# Patient Record
Sex: MALE | Race: WHITE | Employment: OTHER | URBAN - METROPOLITAN AREA
[De-identification: names, ages, dates, MRNs, and addresses within clinical notes are randomized per-mention and may not be internally consistent; named-entity substitution may affect disease eponyms.]

---

## 2020-02-17 ENCOUNTER — OFFICE VISIT (OUTPATIENT)
Dept: FAMILY MEDICINE CLINIC | Facility: CLINIC | Age: 80
End: 2020-02-17
Payer: COMMERCIAL

## 2020-02-17 VITALS
RESPIRATION RATE: 18 BRPM | DIASTOLIC BLOOD PRESSURE: 70 MMHG | HEIGHT: 68 IN | HEART RATE: 81 BPM | TEMPERATURE: 98 F | WEIGHT: 148 LBS | SYSTOLIC BLOOD PRESSURE: 140 MMHG | BODY MASS INDEX: 22.43 KG/M2 | OXYGEN SATURATION: 96 %

## 2020-02-17 DIAGNOSIS — R05.9 COUGH: Primary | ICD-10-CM

## 2020-02-17 DIAGNOSIS — I95.1 ORTHOSTATIC HYPOTENSION: ICD-10-CM

## 2020-02-17 DIAGNOSIS — Z13.1 DIABETES MELLITUS SCREENING: ICD-10-CM

## 2020-02-17 DIAGNOSIS — Z13.220 SCREENING FOR HYPERLIPIDEMIA: ICD-10-CM

## 2020-02-17 DIAGNOSIS — I10 ESSENTIAL HYPERTENSION: ICD-10-CM

## 2020-02-17 PROBLEM — J43.8 OTHER EMPHYSEMA (HCC): Status: ACTIVE | Noted: 2020-02-17

## 2020-02-17 PROBLEM — F17.200 TOBACCO DEPENDENCE: Status: ACTIVE | Noted: 2020-02-17

## 2020-02-17 PROCEDURE — 99203 OFFICE O/P NEW LOW 30 MIN: CPT | Performed by: FAMILY MEDICINE

## 2020-02-17 RX ORDER — LOSARTAN POTASSIUM 50 MG/1
TABLET ORAL
COMMUNITY
Start: 2020-02-06 | End: 2021-03-09

## 2020-02-17 RX ORDER — AMLODIPINE BESYLATE 10 MG/1
TABLET ORAL
COMMUNITY
Start: 2020-01-25 | End: 2021-03-09

## 2020-02-17 RX ORDER — FLUTICASONE PROPIONATE 50 MCG
2 SPRAY, SUSPENSION (ML) NASAL DAILY
Qty: 1 BOTTLE | Refills: 1 | Status: SHIPPED | OUTPATIENT
Start: 2020-02-17 | End: 2020-11-17

## 2020-02-17 RX ORDER — OXYBUTYNIN CHLORIDE 5 MG/1
TABLET ORAL
COMMUNITY
Start: 2019-12-06

## 2020-02-17 RX ORDER — FLUTICASONE PROPIONATE 50 MCG
SPRAY, SUSPENSION (ML) NASAL
COMMUNITY
Start: 2020-02-06 | End: 2020-02-17

## 2020-02-17 NOTE — PROGRESS NOTES
HPI:    Patient ID: Janina Goltz is a 70year old male who presents for cough and dizziness. HPI  Cough:  Cough started 3-4 weeks ago. Was productive of green mucus initially, but now more clear over the past few days. Never any blood in cough. typically gold in color. SH: Denies etoh or illicit drug use. Tobacco use as above. Lives with wife Brant Santos at home. Works at a [employer] in [place] x30 years. Works 7 days/wk.      Used to see Benja Solano at [facility].       Past Medical Hi external ear and ear canal normal.   Nose: Nose normal.   Mouth/Throat: Oropharynx is clear and moist. No oropharyngeal exudate or pharynx erythema. Eyes: Pupils are equal, round, and reactive to light.  Conjunctivae and EOM are normal. Right eye exhibits coffee. Likely slight dehydration contributing.   -Will reassess at f/u visit.      3. Essential hypertension  - COMP METABOLIC PANEL (14)    -Home BP typically around 129/82 per patient.   -Continue home BP monitoring.  -Continue current regimen: amlodipin

## 2020-02-24 ENCOUNTER — APPOINTMENT (OUTPATIENT)
Dept: LAB | Facility: REFERENCE LAB | Age: 80
End: 2020-02-24
Attending: FAMILY MEDICINE
Payer: MEDICARE

## 2020-02-24 LAB
ALBUMIN SERPL-MCNC: 4 G/DL (ref 3.4–5)
ALBUMIN/GLOB SERPL: 1.1 {RATIO} (ref 1–2)
ALP LIVER SERPL-CCNC: 107 U/L (ref 45–117)
ALT SERPL-CCNC: 22 U/L (ref 16–61)
ANION GAP SERPL CALC-SCNC: 5 MMOL/L (ref 0–18)
AST SERPL-CCNC: 20 U/L (ref 15–37)
BASOPHILS # BLD AUTO: 0.05 X10(3) UL (ref 0–0.2)
BASOPHILS NFR BLD AUTO: 0.6 %
BILIRUB SERPL-MCNC: 0.5 MG/DL (ref 0.1–2)
BUN BLD-MCNC: 12 MG/DL (ref 7–18)
BUN/CREAT SERPL: 10.8 (ref 10–20)
CALCIUM BLD-MCNC: 9 MG/DL (ref 8.5–10.1)
CHLORIDE SERPL-SCNC: 108 MMOL/L (ref 98–112)
CHOLEST SMN-MCNC: 171 MG/DL (ref ?–200)
CO2 SERPL-SCNC: 24 MMOL/L (ref 21–32)
CREAT BLD-MCNC: 1.11 MG/DL (ref 0.7–1.3)
DEPRECATED RDW RBC AUTO: 42.6 FL (ref 35.1–46.3)
EOSINOPHIL # BLD AUTO: 0.3 X10(3) UL (ref 0–0.7)
EOSINOPHIL NFR BLD AUTO: 3.3 %
ERYTHROCYTE [DISTWIDTH] IN BLOOD BY AUTOMATED COUNT: 13 % (ref 11–15)
EST. AVERAGE GLUCOSE BLD GHB EST-MCNC: 111 MG/DL (ref 68–126)
GLOBULIN PLAS-MCNC: 3.7 G/DL (ref 2.8–4.4)
GLUCOSE BLD-MCNC: 86 MG/DL (ref 70–99)
HBA1C MFR BLD HPLC: 5.5 % (ref ?–5.7)
HCT VFR BLD AUTO: 46 % (ref 39–53)
HDLC SERPL-MCNC: 38 MG/DL (ref 40–59)
HGB BLD-MCNC: 16.2 G/DL (ref 13–17.5)
IMM GRANULOCYTES # BLD AUTO: 0.02 X10(3) UL (ref 0–1)
IMM GRANULOCYTES NFR BLD: 0.2 %
LDLC SERPL CALC-MCNC: 106 MG/DL (ref ?–100)
LYMPHOCYTES # BLD AUTO: 2.89 X10(3) UL (ref 1–4)
LYMPHOCYTES NFR BLD AUTO: 32 %
M PROTEIN MFR SERPL ELPH: 7.7 G/DL (ref 6.4–8.2)
MCH RBC QN AUTO: 31.6 PG (ref 26–34)
MCHC RBC AUTO-ENTMCNC: 35.2 G/DL (ref 31–37)
MCV RBC AUTO: 89.8 FL (ref 80–100)
MONOCYTES # BLD AUTO: 0.58 X10(3) UL (ref 0.1–1)
MONOCYTES NFR BLD AUTO: 6.4 %
NEUTROPHILS # BLD AUTO: 5.19 X10 (3) UL (ref 1.5–7.7)
NEUTROPHILS # BLD AUTO: 5.19 X10(3) UL (ref 1.5–7.7)
NEUTROPHILS NFR BLD AUTO: 57.5 %
NONHDLC SERPL-MCNC: 133 MG/DL (ref ?–130)
OSMOLALITY SERPL CALC.SUM OF ELEC: 283 MOSM/KG (ref 275–295)
PATIENT FASTING Y/N/NP: YES
PATIENT FASTING Y/N/NP: YES
PLATELET # BLD AUTO: 197 10(3)UL (ref 150–450)
POTASSIUM SERPL-SCNC: 3.7 MMOL/L (ref 3.5–5.1)
RBC # BLD AUTO: 5.12 X10(6)UL (ref 3.8–5.8)
SODIUM SERPL-SCNC: 137 MMOL/L (ref 136–145)
TRIGL SERPL-MCNC: 135 MG/DL (ref 30–149)
VLDLC SERPL CALC-MCNC: 27 MG/DL (ref 0–30)
WBC # BLD AUTO: 9 X10(3) UL (ref 4–11)

## 2020-02-24 PROCEDURE — 80053 COMPREHEN METABOLIC PANEL: CPT | Performed by: FAMILY MEDICINE

## 2020-02-24 PROCEDURE — 80061 LIPID PANEL: CPT | Performed by: FAMILY MEDICINE

## 2020-02-24 PROCEDURE — 36415 COLL VENOUS BLD VENIPUNCTURE: CPT | Performed by: FAMILY MEDICINE

## 2020-02-24 PROCEDURE — 85025 COMPLETE CBC W/AUTO DIFF WBC: CPT | Performed by: FAMILY MEDICINE

## 2020-02-24 PROCEDURE — 83036 HEMOGLOBIN GLYCOSYLATED A1C: CPT | Performed by: FAMILY MEDICINE

## 2020-03-03 ENCOUNTER — OFFICE VISIT (OUTPATIENT)
Dept: FAMILY MEDICINE CLINIC | Facility: CLINIC | Age: 80
End: 2020-03-03
Payer: COMMERCIAL

## 2020-03-03 VITALS
TEMPERATURE: 98 F | HEIGHT: 68 IN | HEART RATE: 79 BPM | DIASTOLIC BLOOD PRESSURE: 78 MMHG | OXYGEN SATURATION: 96 % | BODY MASS INDEX: 22.43 KG/M2 | RESPIRATION RATE: 16 BRPM | SYSTOLIC BLOOD PRESSURE: 140 MMHG | WEIGHT: 148 LBS

## 2020-03-03 DIAGNOSIS — R05.9 COUGH: Primary | ICD-10-CM

## 2020-03-03 DIAGNOSIS — I95.1 ORTHOSTATIC HYPOTENSION: ICD-10-CM

## 2020-03-03 DIAGNOSIS — I10 ESSENTIAL HYPERTENSION: ICD-10-CM

## 2020-03-03 PROCEDURE — 99213 OFFICE O/P EST LOW 20 MIN: CPT | Performed by: FAMILY MEDICINE

## 2020-03-03 NOTE — PROGRESS NOTES
HPI:    Patient ID: Gabby Odonnell is a 70year old male who presents for f/u for cough, HTN, and dizziness. HPI  Cough:  Using flonase every night. Missed last night, and cough seemed to be worse. Wife thinks his cough is better.    Overall thinks Negative for heartburn, nausea, vomiting, abdominal pain, diarrhea, constipation, blood in stool, abdominal distention and anal bleeding. Genitourinary: Negative. Musculoskeletal: Negative. Skin: Negative.     Allergic/Immunologic: Negative for envi low-dose CT chest at that time for lung cancer screening. 2. Essential hypertension  -Home BP typically around 129/82 per patient.   -Continue home BP monitoring.  -Continue current regimen: amlodipine 10mg qPM, losartan 50mg qPM    3.  Orthostatic hypo

## 2020-11-16 ENCOUNTER — TELEPHONE (OUTPATIENT)
Dept: FAMILY MEDICINE CLINIC | Facility: CLINIC | Age: 80
End: 2020-11-16

## 2020-11-16 NOTE — TELEPHONE ENCOUNTER
As per MP request, pt scheduled for follow up 11/17 at 3:30pm.       RN returning Pt's call. Pt reports numbness in tongue, first noticed \" a couple of hours ago\". Pt states he was upset about something and noticed his tongue felt numb.  Pt denies any num

## 2020-11-16 NOTE — TELEPHONE ENCOUNTER
Patient states his tongue is numb since this morning and wants to know if he should go to the er or make an appointment   Please call back

## 2020-11-17 RX ORDER — FLUTICASONE PROPIONATE 50 MCG
SPRAY, SUSPENSION (ML) NASAL
Qty: 16 G | Refills: 0 | Status: SHIPPED | OUTPATIENT
Start: 2020-11-17 | End: 2021-06-17

## 2020-12-17 ENCOUNTER — TELEPHONE (OUTPATIENT)
Dept: FAMILY MEDICINE CLINIC | Facility: CLINIC | Age: 80
End: 2020-12-17

## 2021-02-03 DIAGNOSIS — Z23 NEED FOR VACCINATION: ICD-10-CM

## 2021-02-10 ENCOUNTER — PATIENT OUTREACH (OUTPATIENT)
Dept: FAMILY MEDICINE CLINIC | Facility: CLINIC | Age: 81
End: 2021-02-10

## 2021-03-09 ENCOUNTER — OFFICE VISIT (OUTPATIENT)
Dept: FAMILY MEDICINE CLINIC | Facility: CLINIC | Age: 81
End: 2021-03-09
Payer: COMMERCIAL

## 2021-03-09 VITALS
DIASTOLIC BLOOD PRESSURE: 70 MMHG | HEART RATE: 80 BPM | HEIGHT: 68 IN | OXYGEN SATURATION: 98 % | SYSTOLIC BLOOD PRESSURE: 140 MMHG | BODY MASS INDEX: 21.98 KG/M2 | WEIGHT: 145 LBS

## 2021-03-09 DIAGNOSIS — Z13.6 SCREENING FOR AAA (ABDOMINAL AORTIC ANEURYSM): ICD-10-CM

## 2021-03-09 DIAGNOSIS — J43.8 OTHER EMPHYSEMA (HCC): ICD-10-CM

## 2021-03-09 DIAGNOSIS — Z00.00 ENCOUNTER FOR ANNUAL HEALTH EXAMINATION: Primary | ICD-10-CM

## 2021-03-09 DIAGNOSIS — Z12.2 ENCOUNTER FOR SCREENING FOR LUNG CANCER: ICD-10-CM

## 2021-03-09 DIAGNOSIS — I10 ESSENTIAL HYPERTENSION: ICD-10-CM

## 2021-03-09 DIAGNOSIS — Z28.21 PNEUMOCOCCAL VACCINATION DECLINED: ICD-10-CM

## 2021-03-09 DIAGNOSIS — Z28.21 INFLUENZA VACCINATION DECLINED: ICD-10-CM

## 2021-03-09 DIAGNOSIS — F17.200 TOBACCO DEPENDENCE: ICD-10-CM

## 2021-03-09 DIAGNOSIS — Z12.5 PROSTATE CANCER SCREENING: ICD-10-CM

## 2021-03-09 DIAGNOSIS — Z13.220 SCREENING FOR HYPERLIPIDEMIA: ICD-10-CM

## 2021-03-09 PROBLEM — Z72.0 TOBACCO USER: Status: ACTIVE | Noted: 2019-03-19

## 2021-03-09 PROBLEM — Z72.0 TOBACCO USER: Status: RESOLVED | Noted: 2019-03-19 | Resolved: 2021-03-09

## 2021-03-09 PROBLEM — J44.9 CHRONIC OBSTRUCTIVE LUNG DISEASE (HCC): Status: ACTIVE | Noted: 2019-03-19

## 2021-03-09 PROCEDURE — 96160 PT-FOCUSED HLTH RISK ASSMT: CPT | Performed by: FAMILY MEDICINE

## 2021-03-09 PROCEDURE — G0439 PPPS, SUBSEQ VISIT: HCPCS | Performed by: FAMILY MEDICINE

## 2021-03-09 PROCEDURE — 99397 PER PM REEVAL EST PAT 65+ YR: CPT | Performed by: FAMILY MEDICINE

## 2021-03-09 PROCEDURE — 3077F SYST BP >= 140 MM HG: CPT | Performed by: FAMILY MEDICINE

## 2021-03-09 PROCEDURE — 3078F DIAST BP <80 MM HG: CPT | Performed by: FAMILY MEDICINE

## 2021-03-09 PROCEDURE — 3008F BODY MASS INDEX DOCD: CPT | Performed by: FAMILY MEDICINE

## 2021-03-09 RX ORDER — AMLODIPINE BESYLATE 10 MG/1
10 TABLET ORAL DAILY
Qty: 90 TABLET | Refills: 0 | Status: SHIPPED | OUTPATIENT
Start: 2021-03-09 | End: 2021-05-26

## 2021-03-09 RX ORDER — LOSARTAN POTASSIUM 50 MG/1
50 TABLET ORAL DAILY
Qty: 90 TABLET | Refills: 1 | Status: SHIPPED | OUTPATIENT
Start: 2021-03-09 | End: 2021-06-02

## 2021-03-09 NOTE — TELEPHONE ENCOUNTER
A refill request was received for:  Requested Prescriptions     Pending Prescriptions Disp Refills   • amLODIPine Besylate 10 MG Oral Tab 90 tablet 0     Sig: Take 1 tablet (10 mg total) by mouth daily.      Last refill date: 11/30/2020  Qty: 90  Last offic

## 2021-03-10 NOTE — PROGRESS NOTES
HPI:   Judy Smith is a 68year old male who presents for a MA (Medicare [employer]age) [address] (Once per calendar year). Still checking BP at home. Wife checks it and says it is good. She is a retired nurse.  Pt unsure of values but have been sta based on screening of functional status. He has difficulties Affording Meds based on screening of functional status. He has Hearing problems based on screening of functional status.       He has Vision problems based on screening of functional s TRIG 135 02/24/2020          Last Chemistry Labs:   Lab Results   Component Value Date    AST 20 02/24/2020    ALT 22 02/24/2020    CA 9.0 02/24/2020    ALB 4.0 02/24/2020    CREATSERUM 1.11 02/24/2020    GLU 86 02/24/2020        CBC  (most recent labs) lb (65.8 kg).     Medicare Hearing Assessment  (Required for AWV/SWV)    Normal via finger rub           Visual Acuity  Right Eye Visual Acuity: Corrected Right Eye Chart Acuity: 20/40   Left Eye Visual Acuity: Corrected Left Eye Chart Acuity: 20/50   Both (14)  -     CBC, PLATELET; NO DIFFERENTIAL    Other emphysema ([facility] [address]    Tobacco dependence    Encounter for screening for lung cancer  -     CT LUNG LD SCREENING(CPT=71271);  Future    Prostate cancer screening  -     PSA SCREEN; Future    Screening for hype every 10 years No results found for this or any previous visit. No flowsheet data found. Fecal Occult Blood Annually No results found for: FOB No flowsheet data found.     Glaucoma Screening      Ophthalmology Visit Annually: Diabetics, FHx Glaucoma, AA DIGOXIN, DIG, VALP No flowsheet data found. COPD      Spirometry Testing Annually Spirometry date:  No flowsheet data found.

## 2021-03-10 NOTE — PATIENT INSTRUCTIONS
Aye Dyer's SCREENING SCHEDULE   Tests on this list are recommended by your physician but may not be covered, or covered at this frequency, by your insurer. Please check with your insurance carrier before scheduling to verify coverage.     PREVENT more often if abnormal There are no preventive care reminders to display for this patient. Update Health Maintenance if applicable    Flex Sigmoidoscopy Screen  Covered every 5 years No results found for this or any previous visit. No flowsheet data found. be covered with your prescription benefits, but Medicare does not cover unless Medically needed    Zoster (Not covered by Medicare Part B) No orders found for this or any previous visit.  This may be covered with your pharmacy  prescription benefits     Rec

## 2021-03-26 ENCOUNTER — TELEPHONE (OUTPATIENT)
Dept: FAMILY MEDICINE CLINIC | Facility: CLINIC | Age: 81
End: 2021-03-26

## 2021-03-26 NOTE — TELEPHONE ENCOUNTER
Spoke with MP and:    Connie Fox, DO  You Just now (12:11 PM)     Sounds like orthostatic hypotension. If not having any other symptoms & dizziness resolves spontaneously, no need to go to IC/ED.  Increase water intake and encourage very slow positiona

## 2021-03-26 NOTE — TELEPHONE ENCOUNTER
Pts daughter called to say the pt has been feeling dizzy when he sits down or gets up from sitting. Pt feels it might be bp medication. Pt has been on same meds for last 2 years. Pt daughter states he did fall backwards the other day.      Pts daughter stat

## 2021-03-26 NOTE — TELEPHONE ENCOUNTER
Called daughter and pt c/o dizziness from laying down/sitting down or dizziness, started the last couple of weeks, when he gets up fast, \"feels like dizzy and off balance, when he is up he is fine performing his activities.       Denies passing out, noises

## 2021-05-24 ENCOUNTER — HOSPITAL ENCOUNTER (OUTPATIENT)
Dept: CT IMAGING | Facility: HOSPITAL | Age: 81
Discharge: HOME | End: 2021-05-24
Attending: FAMILY MEDICINE
Payer: MEDICARE

## 2021-05-24 DIAGNOSIS — Z12.2 ENCOUNTER FOR SCREENING FOR LUNG CANCER: ICD-10-CM

## 2021-05-24 PROCEDURE — 71271 CT THORAX LUNG CANCER SCR C-: CPT | Performed by: FAMILY MEDICINE

## 2021-05-25 ENCOUNTER — TELEPHONE (OUTPATIENT)
Dept: FAMILY MEDICINE CLINIC | Facility: CLINIC | Age: 81
End: 2021-05-25

## 2021-05-25 NOTE — TELEPHONE ENCOUNTER
Spoke to Dr. Narendra Morejon over phone. Pt has two suspicious nodules. His case to be reviewed by tumor board this Thursday to determine next step, but likely PET-CT. I will await update later this week.

## 2021-05-26 RX ORDER — AMLODIPINE BESYLATE 10 MG/1
TABLET ORAL
Qty: 90 TABLET | Refills: 0 | Status: SHIPPED | OUTPATIENT
Start: 2021-05-26 | End: 2021-08-24

## 2021-05-26 NOTE — TELEPHONE ENCOUNTER
A refill request was received for:  Requested Prescriptions     Pending Prescriptions Disp Refills   • AMLODIPINE BESYLATE 10 MG Oral Tab [Pharmacy Med Name: AMLODIPINE BESYLATE 10MG TABLETS] 90 tablet 0     Sig: TAKE 1 TABLET(10 MG) BY MOUTH DAILY     Las

## 2021-05-28 DIAGNOSIS — R91.8 ABNORMAL CT LUNG SCREENING: Primary | ICD-10-CM

## 2021-06-02 RX ORDER — LOSARTAN POTASSIUM 50 MG/1
50 TABLET ORAL DAILY
Qty: 90 TABLET | Refills: 1 | Status: SHIPPED | OUTPATIENT
Start: 2021-06-02 | End: 2021-11-01

## 2021-06-10 NOTE — TELEPHONE ENCOUNTER
Called daughter, takes two BP medication Losartan and Amlodipine. Per daughter feels it is two strong and would like to take 25 mg instead of 50 mg. Pt feels dizzy when taking Losartan.   Pt's BP with Amlodipine 139/80-89 for two days increases 150's to 1

## 2021-06-10 NOTE — TELEPHONE ENCOUNTER
Informed MP of pt and stated he should be seen. LVM to call office to schedule an appointment and bring BP cuff to compare.

## 2021-06-10 NOTE — TELEPHONE ENCOUNTER
Patients daughter stated that he can't function when he takes this medication Losartan 50mg is wondering if he can get this in a smaller dosage because he just doesn't feel right.

## 2021-06-11 NOTE — TELEPHONE ENCOUNTER
Per Dr. Dalia Case pt can stop Losartan, to continue Amlodipine monitor BP 2 to 3x daily until visit and if above 150 to call office. Called Sarkis Morataya (daughter) informed of MP's instructions asked instead of stopping medication take half of the dose.   Will f

## 2021-06-11 NOTE — TELEPHONE ENCOUNTER
Spoke with MP about pt taking Losartan and can do half but monitor BP 2 to 3 x daily provide results at visit with any symptoms and call if over 150. Called daughter, informed and verbalized understanding.

## 2021-06-11 NOTE — TELEPHONE ENCOUNTER
Patient called again on what he should do in the mean time until his appointment on 6/17 with his BP medication.

## 2021-06-15 ENCOUNTER — HOSPITAL ENCOUNTER (OUTPATIENT)
Dept: NUCLEAR MEDICINE | Facility: HOSPITAL | Age: 81
Discharge: HOME | End: 2021-06-15
Attending: FAMILY MEDICINE
Payer: MEDICARE

## 2021-06-15 DIAGNOSIS — R91.8 ABNORMAL CT LUNG SCREENING: ICD-10-CM

## 2021-06-15 PROCEDURE — 82962 GLUCOSE BLOOD TEST: CPT

## 2021-06-15 PROCEDURE — 78815 PET IMAGE W/CT SKULL-THIGH: CPT | Performed by: FAMILY MEDICINE

## 2021-06-17 ENCOUNTER — OFFICE VISIT (OUTPATIENT)
Dept: FAMILY MEDICINE CLINIC | Facility: CLINIC | Age: 81
End: 2021-06-17
Payer: COMMERCIAL

## 2021-06-17 VITALS
WEIGHT: 137 LBS | SYSTOLIC BLOOD PRESSURE: 132 MMHG | HEART RATE: 67 BPM | HEIGHT: 68 IN | OXYGEN SATURATION: 98 % | BODY MASS INDEX: 20.76 KG/M2 | DIASTOLIC BLOOD PRESSURE: 62 MMHG

## 2021-06-17 DIAGNOSIS — I10 ESSENTIAL HYPERTENSION: ICD-10-CM

## 2021-06-17 DIAGNOSIS — K44.9 HIATAL HERNIA: ICD-10-CM

## 2021-06-17 DIAGNOSIS — F17.200 CURRENT SMOKER: ICD-10-CM

## 2021-06-17 DIAGNOSIS — J43.2 CENTRILOBULAR EMPHYSEMA (HCC): ICD-10-CM

## 2021-06-17 DIAGNOSIS — R91.8 PULMONARY NODULES: Primary | ICD-10-CM

## 2021-06-17 PROCEDURE — 3078F DIAST BP <80 MM HG: CPT | Performed by: FAMILY MEDICINE

## 2021-06-17 PROCEDURE — 99214 OFFICE O/P EST MOD 30 MIN: CPT | Performed by: FAMILY MEDICINE

## 2021-06-17 PROCEDURE — 3075F SYST BP GE 130 - 139MM HG: CPT | Performed by: FAMILY MEDICINE

## 2021-06-17 PROCEDURE — 3008F BODY MASS INDEX DOCD: CPT | Performed by: FAMILY MEDICINE

## 2021-06-17 RX ORDER — FLUTICASONE PROPIONATE 50 MCG
2 SPRAY, SUSPENSION (ML) NASAL DAILY
Qty: 16 G | Refills: 5 | Status: SHIPPED | OUTPATIENT
Start: 2021-06-17

## 2021-06-17 NOTE — PROGRESS NOTES
HPI:    Patient ID: Boom Miles is a 68year old male who presents for results from PET-CT and HTN f/u. HPI  Lung nodules:  Recently had PET-CT. Still smoking. No changes since last visit. HTN:   Stopped taking losartan 50mg one week ago.    Russel Doe rub. No gallop. Pulmonary:      Effort: Pulmonary effort is normal. No respiratory distress. Breath sounds: Normal breath sounds. No wheezing, rhonchi or rales. Musculoskeletal:      Cervical back: Neck supple. Right lower leg: No edema.

## 2021-08-24 RX ORDER — AMLODIPINE BESYLATE 10 MG/1
TABLET ORAL
Qty: 90 TABLET | Refills: 0 | Status: SHIPPED | OUTPATIENT
Start: 2021-08-24 | End: 2021-11-15

## 2021-08-24 NOTE — TELEPHONE ENCOUNTER
A refill request was received for:  Requested Prescriptions     Pending Prescriptions Disp Refills   • AMLODIPINE 10 MG Oral Tab [Pharmacy Med Name: AMLODIPINE BESYLATE 10MG TABLETS] 90 tablet 0     Sig: TAKE 1 TABLET(10 MG) BY MOUTH DAILY     Last refill

## 2021-11-01 ENCOUNTER — TELEPHONE (OUTPATIENT)
Dept: FAMILY MEDICINE CLINIC | Facility: CLINIC | Age: 81
End: 2021-11-01

## 2021-11-01 NOTE — TELEPHONE ENCOUNTER
Reached patient's wife, Angeles Delacruz (ok per HIPAA) for medication adherence consult. Patient is past due for refill on losartan. Wife states patient is no longer taking this medication and only taking it as needed. He is not needing to use this very often.  H

## 2021-11-15 RX ORDER — AMLODIPINE BESYLATE 10 MG/1
TABLET ORAL
Qty: 90 TABLET | Refills: 0 | Status: SHIPPED | OUTPATIENT
Start: 2021-11-15

## 2022-02-21 RX ORDER — AMLODIPINE BESYLATE 10 MG/1
10 TABLET ORAL DAILY
Qty: 90 TABLET | Refills: 0 | Status: SHIPPED | OUTPATIENT
Start: 2022-02-21 | End: 2022-05-24

## 2022-04-18 ENCOUNTER — TELEPHONE (OUTPATIENT)
Dept: FAMILY MEDICINE CLINIC | Facility: CLINIC | Age: 82
End: 2022-04-18

## 2022-04-18 NOTE — TELEPHONE ENCOUNTER
Daughter states patient's left arm has been feeling numb for the past few days. 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 \"  States \"my dad is so stubborn he will not leave work to be seen. \"  Advised to go to urgent care after he gets off from work. Daughter states he will not go to any UC/ER and only \"wants to see his own doctor. \"    Daughter denies patient has any shortness of breath, chest pain. Scheduled Wednesday with Dr. Gwendolyn Sandoval at 1:30 pm.  Advised ER if any worsening symptoms.

## 2022-04-20 ENCOUNTER — OFFICE VISIT (OUTPATIENT)
Dept: FAMILY MEDICINE CLINIC | Facility: CLINIC | Age: 82
End: 2022-04-20
Payer: COMMERCIAL

## 2022-04-20 VITALS
HEIGHT: 68 IN | DIASTOLIC BLOOD PRESSURE: 72 MMHG | HEART RATE: 84 BPM | SYSTOLIC BLOOD PRESSURE: 136 MMHG | BODY MASS INDEX: 21.37 KG/M2 | OXYGEN SATURATION: 98 % | WEIGHT: 141 LBS

## 2022-04-20 DIAGNOSIS — G56.02 CARPAL TUNNEL SYNDROME OF LEFT WRIST: Primary | ICD-10-CM

## 2022-04-20 PROCEDURE — 3078F DIAST BP <80 MM HG: CPT | Performed by: FAMILY MEDICINE

## 2022-04-20 PROCEDURE — 3008F BODY MASS INDEX DOCD: CPT | Performed by: FAMILY MEDICINE

## 2022-04-20 PROCEDURE — 99213 OFFICE O/P EST LOW 20 MIN: CPT | Performed by: FAMILY MEDICINE

## 2022-04-20 PROCEDURE — 3075F SYST BP GE 130 - 139MM HG: CPT | Performed by: FAMILY MEDICINE

## 2022-05-24 ENCOUNTER — OFFICE VISIT (OUTPATIENT)
Dept: FAMILY MEDICINE CLINIC | Facility: CLINIC | Age: 82
End: 2022-05-24
Payer: COMMERCIAL

## 2022-05-24 VITALS
OXYGEN SATURATION: 98 % | WEIGHT: 143 LBS | DIASTOLIC BLOOD PRESSURE: 72 MMHG | HEIGHT: 68 IN | HEART RATE: 72 BPM | SYSTOLIC BLOOD PRESSURE: 118 MMHG | BODY MASS INDEX: 21.67 KG/M2

## 2022-05-24 DIAGNOSIS — I10 ESSENTIAL HYPERTENSION: ICD-10-CM

## 2022-05-24 DIAGNOSIS — Z12.5 PROSTATE CANCER SCREENING: ICD-10-CM

## 2022-05-24 DIAGNOSIS — F17.200 TOBACCO DEPENDENCE: ICD-10-CM

## 2022-05-24 DIAGNOSIS — G56.02 CARPAL TUNNEL SYNDROME OF LEFT WRIST: ICD-10-CM

## 2022-05-24 DIAGNOSIS — R73.01 ELEVATED FASTING GLUCOSE: ICD-10-CM

## 2022-05-24 DIAGNOSIS — Z00.00 ENCOUNTER FOR ANNUAL HEALTH EXAMINATION: Primary | ICD-10-CM

## 2022-05-24 DIAGNOSIS — R91.8 PULMONARY NODULES: ICD-10-CM

## 2022-05-24 DIAGNOSIS — Z87.442 HISTORY OF NEPHROLITHIASIS: ICD-10-CM

## 2022-05-24 DIAGNOSIS — I95.1 ORTHOSTATIC HYPOTENSION: ICD-10-CM

## 2022-05-24 DIAGNOSIS — J43.2 CENTRILOBULAR EMPHYSEMA (HCC): ICD-10-CM

## 2022-05-24 PROCEDURE — 3008F BODY MASS INDEX DOCD: CPT | Performed by: FAMILY MEDICINE

## 2022-05-24 PROCEDURE — 3078F DIAST BP <80 MM HG: CPT | Performed by: FAMILY MEDICINE

## 2022-05-24 PROCEDURE — 99397 PER PM REEVAL EST PAT 65+ YR: CPT | Performed by: FAMILY MEDICINE

## 2022-05-24 PROCEDURE — 3074F SYST BP LT 130 MM HG: CPT | Performed by: FAMILY MEDICINE

## 2022-05-24 PROCEDURE — 96160 PT-FOCUSED HLTH RISK ASSMT: CPT | Performed by: FAMILY MEDICINE

## 2022-05-24 PROCEDURE — G0439 PPPS, SUBSEQ VISIT: HCPCS | Performed by: FAMILY MEDICINE

## 2022-05-24 RX ORDER — AMLODIPINE BESYLATE 10 MG/1
10 TABLET ORAL DAILY
Qty: 90 TABLET | Refills: 3 | Status: SHIPPED | OUTPATIENT
Start: 2022-05-24

## 2022-05-25 ENCOUNTER — MED REC SCAN ONLY (OUTPATIENT)
Dept: FAMILY MEDICINE CLINIC | Facility: CLINIC | Age: 82
End: 2022-05-25

## 2022-12-14 ENCOUNTER — OFFICE VISIT (OUTPATIENT)
Dept: FAMILY MEDICINE CLINIC | Facility: CLINIC | Age: 82
End: 2022-12-14
Payer: COMMERCIAL

## 2022-12-14 ENCOUNTER — TELEPHONE (OUTPATIENT)
Dept: FAMILY MEDICINE CLINIC | Facility: CLINIC | Age: 82
End: 2022-12-14

## 2022-12-14 VITALS
OXYGEN SATURATION: 94 % | SYSTOLIC BLOOD PRESSURE: 126 MMHG | HEIGHT: 68 IN | RESPIRATION RATE: 16 BRPM | WEIGHT: 143 LBS | BODY MASS INDEX: 21.67 KG/M2 | DIASTOLIC BLOOD PRESSURE: 72 MMHG | HEART RATE: 61 BPM

## 2022-12-14 DIAGNOSIS — J06.9 VIRAL URI: ICD-10-CM

## 2022-12-14 DIAGNOSIS — J44.1 COPD EXACERBATION (HCC): Primary | ICD-10-CM

## 2022-12-14 PROCEDURE — 3078F DIAST BP <80 MM HG: CPT | Performed by: FAMILY MEDICINE

## 2022-12-14 PROCEDURE — 99214 OFFICE O/P EST MOD 30 MIN: CPT | Performed by: FAMILY MEDICINE

## 2022-12-14 PROCEDURE — 3008F BODY MASS INDEX DOCD: CPT | Performed by: FAMILY MEDICINE

## 2022-12-14 PROCEDURE — 3074F SYST BP LT 130 MM HG: CPT | Performed by: FAMILY MEDICINE

## 2022-12-14 RX ORDER — ALBUTEROL SULFATE 90 UG/1
2 AEROSOL, METERED RESPIRATORY (INHALATION) EVERY 6 HOURS PRN
Qty: 1 EACH | Refills: 0 | Status: SHIPPED | OUTPATIENT
Start: 2022-12-14

## 2022-12-14 RX ORDER — AMOXICILLIN AND CLAVULANATE POTASSIUM 875; 125 MG/1; MG/1
1 TABLET, FILM COATED ORAL 2 TIMES DAILY
Qty: 10 TABLET | Refills: 0 | Status: SHIPPED | OUTPATIENT
Start: 2022-12-14 | End: 2022-12-19

## 2022-12-14 RX ORDER — PREDNISONE 20 MG/1
40 TABLET ORAL DAILY
Qty: 10 TABLET | Refills: 0 | Status: SHIPPED | OUTPATIENT
Start: 2022-12-14 | End: 2022-12-19

## 2022-12-14 RX ORDER — FLUTICASONE PROPIONATE 50 MCG
2 SPRAY, SUSPENSION (ML) NASAL DAILY
Qty: 16 G | Refills: 5 | Status: SHIPPED | OUTPATIENT
Start: 2022-12-14

## 2022-12-14 NOTE — TELEPHONE ENCOUNTER
Verified name and . Patient requesting appointment to be assessed- has had cough for 2 weeks- COVID-19 negative. He denies any difficulty breathing or chest pain this time.     Appointment scheduled:  Future Appointments   Date Time Provider Urszula Tobin   2022 11:00 AM Ledy Tobin DO [facility] FP [facility] OP

## 2023-03-07 ENCOUNTER — OFFICE VISIT (OUTPATIENT)
Facility: CLINIC | Age: 83
End: 2023-03-07
Payer: COMMERCIAL

## 2023-03-07 VITALS
DIASTOLIC BLOOD PRESSURE: 72 MMHG | HEART RATE: 72 BPM | BODY MASS INDEX: 21.98 KG/M2 | WEIGHT: 145 LBS | HEIGHT: 68 IN | OXYGEN SATURATION: 98 % | SYSTOLIC BLOOD PRESSURE: 118 MMHG

## 2023-03-07 DIAGNOSIS — R91.8 PULMONARY NODULES: ICD-10-CM

## 2023-03-07 DIAGNOSIS — R05.3 CHRONIC COUGH: ICD-10-CM

## 2023-03-07 DIAGNOSIS — F17.200 TOBACCO DEPENDENCE: ICD-10-CM

## 2023-03-07 DIAGNOSIS — Z00.00 ENCOUNTER FOR ANNUAL HEALTH EXAMINATION: Primary | ICD-10-CM

## 2023-03-07 DIAGNOSIS — J44.9 CHRONIC OBSTRUCTIVE PULMONARY DISEASE, UNSPECIFIED COPD TYPE (HCC): ICD-10-CM

## 2023-03-07 DIAGNOSIS — J43.2 CENTRILOBULAR EMPHYSEMA (HCC): ICD-10-CM

## 2023-03-07 DIAGNOSIS — Z12.5 PROSTATE CANCER SCREENING: ICD-10-CM

## 2023-03-07 DIAGNOSIS — I95.1 ORTHOSTATIC HYPOTENSION: ICD-10-CM

## 2023-03-07 DIAGNOSIS — Z87.442 HISTORY OF NEPHROLITHIASIS: ICD-10-CM

## 2023-03-07 DIAGNOSIS — I10 ESSENTIAL HYPERTENSION: ICD-10-CM

## 2023-03-07 PROBLEM — G56.02 CARPAL TUNNEL SYNDROME OF LEFT WRIST: Status: RESOLVED | Noted: 2022-04-20 | Resolved: 2023-03-07

## 2023-03-07 RX ORDER — PREDNISONE 20 MG/1
40 TABLET ORAL DAILY
Qty: 10 TABLET | Refills: 0 | Status: SHIPPED | OUTPATIENT
Start: 2023-03-07 | End: 2023-03-12

## 2023-03-20 ENCOUNTER — HOSPITAL ENCOUNTER (OUTPATIENT)
Dept: CT IMAGING | Facility: HOSPITAL | Age: 83
Discharge: HOME | End: 2023-03-20
Attending: FAMILY MEDICINE
Payer: MEDICARE

## 2023-03-20 DIAGNOSIS — F17.200 TOBACCO DEPENDENCE: ICD-10-CM

## 2023-03-20 DIAGNOSIS — R91.8 PULMONARY NODULES: ICD-10-CM

## 2023-03-20 PROCEDURE — 71271 CT THORAX LUNG CANCER SCR C-: CPT | Performed by: FAMILY MEDICINE

## 2023-03-24 ENCOUNTER — TELEPHONE (OUTPATIENT)
Facility: CLINIC | Age: 83
End: 2023-03-24

## 2023-03-24 RX ORDER — ALBUTEROL SULFATE 90 UG/1
2 AEROSOL, METERED RESPIRATORY (INHALATION) EVERY 6 HOURS PRN
Qty: 1 EACH | Refills: 1 | Status: SHIPPED | OUTPATIENT
Start: 2023-03-24

## 2023-03-31 ENCOUNTER — TELEPHONE (OUTPATIENT)
Facility: CLINIC | Age: 83
End: 2023-03-31

## 2023-05-30 ENCOUNTER — NURSE TRIAGE (OUTPATIENT)
Facility: CLINIC | Age: 83
End: 2023-05-30

## 2023-05-30 ENCOUNTER — HOSPITAL ENCOUNTER (OUTPATIENT)
Age: 83
Discharge: HOME | End: 2023-05-30
Payer: MEDICARE

## 2023-05-30 VITALS
SYSTOLIC BLOOD PRESSURE: 144 MMHG | RESPIRATION RATE: 20 BRPM | HEART RATE: 97 BPM | DIASTOLIC BLOOD PRESSURE: 68 MMHG | TEMPERATURE: 99 F | OXYGEN SATURATION: 94 %

## 2023-05-30 DIAGNOSIS — J06.9 VIRAL URI WITH COUGH: Primary | ICD-10-CM

## 2023-05-30 DIAGNOSIS — J44.1 COPD EXACERBATION (HCC): ICD-10-CM

## 2023-05-30 PROCEDURE — 99203 OFFICE O/P NEW LOW 30 MIN: CPT | Performed by: NURSE PRACTITIONER

## 2023-05-30 RX ORDER — PREDNISONE 20 MG/1
40 TABLET ORAL DAILY
Qty: 8 TABLET | Refills: 0 | Status: SHIPPED | OUTPATIENT
Start: 2023-05-31 | End: 2023-06-04

## 2023-05-30 RX ORDER — PREDNISONE 20 MG/1
40 TABLET ORAL ONCE
Status: COMPLETED | OUTPATIENT
Start: 2023-05-30 | End: 2023-05-30

## 2023-05-30 NOTE — TELEPHONE ENCOUNTER
Given his age and symptoms he should be evaluated first prior to prescribing Prednisone and since Dr. Jamaica Chang is out of the office and I have no openings today would recommend another provider or IC if needed.

## 2023-05-30 NOTE — ED INITIAL ASSESSMENT (HPI)
Pt states having a dry cough and runny nose that began 3 days ago. Pt denies fevers. Pt denies chest pain.

## 2023-05-30 NOTE — TELEPHONE ENCOUNTER
"Endocrinology Clinic Progress Note    CC: Hypothyroidism, thyroid nodules    HPI:  1. Multiple thyroid nodules  She is scheduled for repeat thyroid ultrasound tomorrow.  Denies any associated difficulty swallowing or breathing.    2. Acquired hypothyroidism  She is currently on levothyroxine 50 mcg daily.  Reports compliance of medications.  Recent TSH is normal.    3. At risk for diabetes mellitus  Recent A1c is 5.7%.  We discussed that she is at risk of developing diabetes.  We discussed about importance of weight loss, we set a goal to lose 15 pounds in the next 6 months.    ROS:  Constitutional: Negative for unintentional weight loss  ENT: Negative for difficulty swallowing    PMH:  Patient Active Problem List   Diagnosis   • Healthy adult   • Thyroid nodule   • Carpal tunnel syndrome   • Allergic conjunctivitis of both eyes   • Hypothyroidism (acquired)   • Obesity (BMI 30-39.9)     EXAM:  Vital signs: /69   Pulse 81   Ht 1.6 m (5' 3\")   Wt 78.9 kg (174 lb)   SpO2 96%   BMI 30.82 kg/m²   General: No apparent distress, cooperative  Eyes: No scleral icterus, no discharge  Neck: Normal on external inspection  Resp: Normal effort  Extremities: No lower extremity edema  Skin: No rash on visible skin  Psych: Alert and oriented, normal mood and affect    Assessment and Plan:    1. Multiple thyroid nodules  · We will follow-up on the ultrasound that is scheduled for tomorrow    2. Acquired hypothyroidism  · Continue levothyroxine 50 mcg daily    3. At risk for diabetes mellitus  · We discussed about diet and lifestyle modification    Return in about 6 months (around 1/27/2019).    Thank you for allowing me to participate in the care of this patient.    Ninoska Rooney M.D.    CC:   YUE Bradshaw.    This note was created using voice recognition software (Dragon). The accuracy of the dictation is limited by the abilities of the software. I have reviewed the note prior to signing, however some errors " Daughter calling back, Alfred Almanzar. Patient's date of birth and full name both confirmed. Patient is now agreeable to go to [facility]. Asking for address and hours, RN provided this information and advised to check website for up-to-date hours of operation. She verbalizes understanding of all information, and agreeable to plan. And wants to keep Thursday appt as a follow-up visit. Therefore, RN changed visit to f/u visit. Daughter verbalizes understanding that if they do not want to keep that appointment, call office tomorrow to cancel.        Future Appointments   Date Time Provider Urszula Tobin   6/1/2023  3:30 PM Arik Rajan DO [facility] FP [facility] OP in grammar and context are still possible. If you have any questions related to this note please do not hesitate to contact our office.

## 2023-05-30 NOTE — TELEPHONE ENCOUNTER
Dr. Mendoza Screws daughter Angeles Fraga was inform of your message below. She stated that pt will not want to see anyone else and will not go to I/C. If I could just give pt a appt when Dr. Franklin Law returns. Daughter understands the recommendation is for pt to be seen today or go to I/C for a evaluation.  Appt was given for Thursday per pt request.

## 2023-06-02 ENCOUNTER — TELEPHONE (OUTPATIENT)
Facility: CLINIC | Age: 83
End: 2023-06-02

## 2023-06-06 ENCOUNTER — APPOINTMENT (OUTPATIENT)
Dept: CT IMAGING | Facility: HOSPITAL | Age: 83
End: 2023-06-06
Attending: EMERGENCY MEDICINE
Payer: MEDICARE

## 2023-06-06 ENCOUNTER — HOSPITAL ENCOUNTER (OUTPATIENT)
Age: 83
Discharge: ANOTHER HOSPITAL | End: 2023-06-06
Payer: MEDICARE

## 2023-06-06 ENCOUNTER — HOSPITAL ENCOUNTER (INPATIENT)
Facility: HOSPITAL | Age: 83
LOS: 2 days | Discharge: HOME | End: 2023-06-08
Attending: EMERGENCY MEDICINE | Admitting: HOSPITALIST
Payer: MEDICARE

## 2023-06-06 VITALS
TEMPERATURE: 98 F | RESPIRATION RATE: 20 BRPM | DIASTOLIC BLOOD PRESSURE: 65 MMHG | HEART RATE: 76 BPM | SYSTOLIC BLOOD PRESSURE: 148 MMHG | OXYGEN SATURATION: 97 %

## 2023-06-06 DIAGNOSIS — R10.9 ABDOMINAL PAIN, ACUTE: Primary | ICD-10-CM

## 2023-06-06 DIAGNOSIS — K52.9 GASTROENTERITIS: Primary | ICD-10-CM

## 2023-06-06 DIAGNOSIS — J44.1 COPD EXACERBATION (HCC): ICD-10-CM

## 2023-06-06 PROBLEM — K56.609 BOWEL OBSTRUCTION (HCC): Status: ACTIVE | Noted: 2023-06-06

## 2023-06-06 LAB
ALBUMIN SERPL-MCNC: 3.2 G/DL (ref 3.4–5)
ALP LIVER SERPL-CCNC: 91 U/L
ALT SERPL-CCNC: 41 U/L
ANION GAP SERPL CALC-SCNC: 3 MMOL/L (ref 0–18)
AST SERPL-CCNC: 27 U/L (ref 15–37)
BASOPHILS # BLD AUTO: 0.02 X10(3) UL (ref 0–0.2)
BASOPHILS NFR BLD AUTO: 0.2 %
BILIRUB DIRECT SERPL-MCNC: 0.3 MG/DL (ref 0–0.2)
BILIRUB SERPL-MCNC: 1 MG/DL (ref 0.1–2)
BILIRUB UR QL: NEGATIVE
BUN BLD-MCNC: 23 MG/DL (ref 7–18)
BUN/CREAT SERPL: 19.7 (ref 10–20)
CALCIUM BLD-MCNC: 8.4 MG/DL (ref 8.5–10.1)
CHLORIDE SERPL-SCNC: 110 MMOL/L (ref 98–112)
CLARITY UR: CLEAR
CO2 SERPL-SCNC: 23 MMOL/L (ref 21–32)
COLOR UR: YELLOW
CREAT BLD-MCNC: 1.17 MG/DL
DEPRECATED RDW RBC AUTO: 43.6 FL (ref 35.1–46.3)
EOSINOPHIL # BLD AUTO: 0.07 X10(3) UL (ref 0–0.7)
EOSINOPHIL NFR BLD AUTO: 0.6 %
ERYTHROCYTE [DISTWIDTH] IN BLOOD BY AUTOMATED COUNT: 13.3 % (ref 11–15)
GFR SERPLBLD BASED ON 1.73 SQ M-ARVRAT: 63 ML/MIN/1.73M2 (ref 60–?)
GLUCOSE BLD-MCNC: 99 MG/DL (ref 70–99)
GLUCOSE UR-MCNC: NORMAL MG/DL
HCT VFR BLD AUTO: 48.7 %
HGB BLD-MCNC: 17.1 G/DL
HGB UR QL STRIP.AUTO: NEGATIVE
IMM GRANULOCYTES # BLD AUTO: 0.07 X10(3) UL (ref 0–1)
IMM GRANULOCYTES NFR BLD: 0.6 %
KETONES UR-MCNC: 10 MG/DL
LEUKOCYTE ESTERASE UR QL STRIP.AUTO: NEGATIVE
LYMPHOCYTES # BLD AUTO: 2.46 X10(3) UL (ref 1–4)
LYMPHOCYTES NFR BLD AUTO: 19.6 %
MCH RBC QN AUTO: 31.4 PG (ref 26–34)
MCHC RBC AUTO-ENTMCNC: 35.1 G/DL (ref 31–37)
MCV RBC AUTO: 89.4 FL
MONOCYTES # BLD AUTO: 0.87 X10(3) UL (ref 0.1–1)
MONOCYTES NFR BLD AUTO: 6.9 %
NEUTROPHILS # BLD AUTO: 9.03 X10 (3) UL (ref 1.5–7.7)
NEUTROPHILS # BLD AUTO: 9.03 X10(3) UL (ref 1.5–7.7)
NEUTROPHILS NFR BLD AUTO: 72.1 %
NITRITE UR QL STRIP.AUTO: NEGATIVE
OSMOLALITY SERPL CALC.SUM OF ELEC: 286 MOSM/KG (ref 275–295)
PH UR: 5.5 [PH] (ref 5–8)
PLATELET # BLD AUTO: 211 10(3)UL (ref 150–450)
POTASSIUM SERPL-SCNC: 3.5 MMOL/L (ref 3.5–5.1)
PROCALCITONIN SERPL-MCNC: 0.08 NG/ML (ref ?–0.16)
PROT SERPL-MCNC: 7.1 G/DL (ref 6.4–8.2)
PROT UR-MCNC: 30 MG/DL
RBC # BLD AUTO: 5.45 X10(6)UL
SODIUM SERPL-SCNC: 136 MMOL/L (ref 136–145)
SP GR UR STRIP: 1.03 (ref 1–1.03)
UROBILINOGEN UR STRIP-ACNC: NORMAL
WBC # BLD AUTO: 12.5 X10(3) UL (ref 4–11)

## 2023-06-06 PROCEDURE — 99215 OFFICE O/P EST HI 40 MIN: CPT | Performed by: EMERGENCY MEDICINE

## 2023-06-06 PROCEDURE — 5A0935Z ASSISTANCE WITH RESPIRATORY VENTILATION, LESS THAN 24 CONSECUTIVE HOURS: ICD-10-PCS | Performed by: HOSPITALIST

## 2023-06-06 PROCEDURE — 99222 1ST HOSP IP/OBS MODERATE 55: CPT | Performed by: SURGERY

## 2023-06-06 PROCEDURE — 99223 1ST HOSP IP/OBS HIGH 75: CPT | Performed by: HOSPITALIST

## 2023-06-06 PROCEDURE — 99223 1ST HOSP IP/OBS HIGH 75: CPT | Performed by: INTERNAL MEDICINE

## 2023-06-06 PROCEDURE — 74177 CT ABD & PELVIS W/CONTRAST: CPT | Performed by: EMERGENCY MEDICINE

## 2023-06-06 RX ORDER — BENZONATATE 100 MG/1
200 CAPSULE ORAL 3 TIMES DAILY PRN
Status: DISCONTINUED | OUTPATIENT
Start: 2023-06-06 | End: 2023-06-08

## 2023-06-06 RX ORDER — IPRATROPIUM BROMIDE AND ALBUTEROL SULFATE 2.5; .5 MG/3ML; MG/3ML
3 SOLUTION RESPIRATORY (INHALATION) EVERY 6 HOURS PRN
Status: DISCONTINUED | OUTPATIENT
Start: 2023-06-06 | End: 2023-06-08

## 2023-06-06 RX ORDER — TEMAZEPAM 15 MG/1
15 CAPSULE ORAL NIGHTLY PRN
Status: DISCONTINUED | OUTPATIENT
Start: 2023-06-06 | End: 2023-06-08

## 2023-06-06 RX ORDER — LEVOFLOXACIN 5 MG/ML
500 INJECTION, SOLUTION INTRAVENOUS EVERY 24 HOURS
Status: DISCONTINUED | OUTPATIENT
Start: 2023-06-06 | End: 2023-06-08

## 2023-06-06 RX ORDER — HEPARIN SODIUM 5000 [USP'U]/ML
5000 INJECTION, SOLUTION INTRAVENOUS; SUBCUTANEOUS EVERY 12 HOURS SCHEDULED
Status: DISCONTINUED | OUTPATIENT
Start: 2023-06-06 | End: 2023-06-08

## 2023-06-06 RX ORDER — SODIUM CHLORIDE 9 MG/ML
INJECTION, SOLUTION INTRAVENOUS CONTINUOUS
Status: DISCONTINUED | OUTPATIENT
Start: 2023-06-06 | End: 2023-06-08

## 2023-06-06 RX ORDER — METHYLPREDNISOLONE SODIUM SUCCINATE 125 MG/2ML
60 INJECTION, POWDER, LYOPHILIZED, FOR SOLUTION INTRAMUSCULAR; INTRAVENOUS ONCE
Status: COMPLETED | OUTPATIENT
Start: 2023-06-06 | End: 2023-06-06

## 2023-06-06 RX ORDER — AZITHROMYCIN 250 MG/1
500 TABLET, FILM COATED ORAL
Status: DISCONTINUED | OUTPATIENT
Start: 2023-06-06 | End: 2023-06-06

## 2023-06-06 RX ORDER — ONDANSETRON 2 MG/ML
4 INJECTION INTRAMUSCULAR; INTRAVENOUS EVERY 6 HOURS PRN
Status: DISCONTINUED | OUTPATIENT
Start: 2023-06-06 | End: 2023-06-08

## 2023-06-06 RX ORDER — METHYLPREDNISOLONE SODIUM SUCCINATE 40 MG/ML
40 INJECTION, POWDER, LYOPHILIZED, FOR SOLUTION INTRAMUSCULAR; INTRAVENOUS EVERY 8 HOURS
Status: DISCONTINUED | OUTPATIENT
Start: 2023-06-06 | End: 2023-06-08

## 2023-06-06 RX ORDER — METOCLOPRAMIDE HYDROCHLORIDE 5 MG/ML
10 INJECTION INTRAMUSCULAR; INTRAVENOUS EVERY 8 HOURS PRN
Status: DISCONTINUED | OUTPATIENT
Start: 2023-06-06 | End: 2023-06-08

## 2023-06-06 RX ORDER — IPRATROPIUM BROMIDE AND ALBUTEROL SULFATE 2.5; .5 MG/3ML; MG/3ML
3 SOLUTION RESPIRATORY (INHALATION) ONCE
Status: COMPLETED | OUTPATIENT
Start: 2023-06-06 | End: 2023-06-06

## 2023-06-06 RX ORDER — ACETAMINOPHEN 500 MG
500 TABLET ORAL EVERY 4 HOURS PRN
Status: DISCONTINUED | OUTPATIENT
Start: 2023-06-06 | End: 2023-06-08

## 2023-06-06 NOTE — ED INITIAL ASSESSMENT (HPI)
Pt with central lower abdominal pain that \"burns\" x3 days, and emesis, cough, and intermittent diarrhea x2 days; denies CP, SOB, or dizziness

## 2023-06-06 NOTE — H&P
[facility]    PATIENT'S NAME: Judson Lyle   ATTENDING PHYSICIAN: Alee Lal MD   PATIENT ACCOUNT#:   [de-identified]    LOCATION:  [address]  MEDICAL RECORD #:   [id number]       YOB: 1940  ADMISSION DATE:       06/06/2023    HISTORY AND PHYSICAL EXAMINATION    CHIEF COMPLAINT:  Gastroenteritis, COPD exacerbation. HISTORY OF PRESENT ILLNESS:  The patient is an 53-year-old  male who came into the emergency department for evaluation of nausea, vomiting, and diarrhea. Patient had CBC showing white blood cell count of 12.5 with left shift. Chemistry and liver function tests were unremarkable. CT scan of the abdomen showed a small-bowel obstruction with transition point in the right lower quadrant, diffuse bibasilar airway inflammatory and mucus plugging has developed with patchy right basilar opacities, most likely due to atelectasis rather than infection. Procalcitonin was ordered, but still pending. Chest x-ray is still pending. PAST MEDICAL HISTORY:  Chronic obstructive pulmonary disease, hypertension, kidney stones. PAST SURGICAL HISTORY:  None. MEDICATIONS:  Please see medication reconciliation list.    ALLERGIES:  Egg derivatives. FAMILY HISTORY:  Positive for hypertension. SOCIAL HISTORY:  Chronic tobacco use, smokes 1 to 2 packs a day. No alcohol or drug use. Lives with his wife. Independent in his basic activities of daily living. REVIEW OF SYSTEMS:  The patient reports symptoms started around 2 weeks ago with cough and wheezing. He was given a course of prednisone and his symptoms got a little bit better, then he started developing nausea, vomiting, cough productive of clear phlegm and then watery diarrhea for the last 3 to 4 days. PHYSICAL EXAMINATION:    GENERAL:  Alert and oriented to time, place, and person. Moderate distress.   VITAL SIGNS:  Temperature 98.2, pulse 75, respiratory rate 20, blood pressure 149/64, pulse oximetry 88% to 90% on room air. HEENT:  Atraumatic. Oropharynx clear. Moist mucous membranes. Ears and nose normal.  Eyes:  Anicteric sclerae. NECK:  Supple. No lymphadenopathy. Trachea midline. LUNGS:  Bilateral expiratory wheezes with increased respiratory effort. HEART:  Regular rate and rhythm. S1 and S2 auscultated. No murmur. ABDOMEN:  Soft. Mildly distended. Patient reported this is the way his abdomen looks chronically. Positive bowel sounds. EXTREMITIES:  No peripheral edema, clubbing, or cyanosis. NEUROLOGIC:  Motor and sensory intact. ASSESSMENT AND PLAN:    1. Gastroenteritis, most likely viral with possible ileus. Doubt mechanical bowel obstruction. 2.   Chronic obstructive pulmonary disease exacerbation, underlying emphysema with chronic tobacco use. Patient will be admitted to general medical floor. Follow up on procalcitonin level. Start him on IV steroids, nebulizer treatments. Keep him on clear liquid diet. Obtain pulmonary and general surgery consult. Monitor his clinical status. Further recommendations to follow.      Dictated By Gisselle Randall MD  d: 06/06/2023 12:51:21  t: 06/06/2023 13:04:19  Job [id number]  /

## 2023-06-06 NOTE — ED INITIAL ASSESSMENT (HPI)
Patient presents to ER with c/o mid abdominal pain x 3-4 days. Reports vomiting and diarrhea. Denies fever.

## 2023-06-06 NOTE — PLAN OF CARE
Received patient from the ER. Patient is alert and oriented. [place]. Glasses. Patient placed on 2L NC. Very strong cough. . On clear liquids, tolerating well. Denies pain at this time or nausea. Ambulating independently. Starting on Levofloxacin for PNA. Calls appropriately.  All needs met at this time     Problem: Patient Centered Care  Goal: Patient preferences are identified and integrated in the patient's plan of care  Description: Interventions:  - What would you like us to know as we care for you?   - Provide timely, complete, and accurate information to patient/family  - Incorporate patient and family knowledge, values, beliefs, and cultural backgrounds into the planning and delivery of care  - Encourage patient/family to participate in care and decision-making at the level they choose  - Honor patient and family perspectives and choices  Outcome: Progressing     Problem: Patient/Family Goals  Goal: Patient/Family Long Term Goal  Description: Patient's Long Term Goal:     Interventions:  -   - See additional Care Plan goals for specific interventions  Outcome: Progressing  Goal: Patient/Family Short Term Goal  Description: Patient's Short Term Goal:     Interventions:   -   - See additional Care Plan goals for specific interventions  Outcome: Progressing

## 2023-06-06 NOTE — ED QUICK NOTES
Orders for admission, patient is aware of plan and ready to go upstairs.  Any questions, please call ED RN Jaz at extension [phone]    Patient Covid vaccination status: Fully vaccinated     COVID Test Ordered in ED: None    COVID Suspicion at Admission: N/A    Running Infusions:  None    Mental Status/LOC at time of transport: AAOX4    Other pertinent information:   CIWA score: N/A   NIH score:  N/A

## 2023-06-07 LAB
ANION GAP SERPL CALC-SCNC: 10 MMOL/L (ref 0–18)
BASOPHILS # BLD AUTO: 0.02 X10(3) UL (ref 0–0.2)
BASOPHILS NFR BLD AUTO: 0.1 %
BUN BLD-MCNC: 17 MG/DL (ref 7–18)
BUN/CREAT SERPL: 21.8 (ref 10–20)
CALCIUM BLD-MCNC: 8 MG/DL (ref 8.5–10.1)
CHLORIDE SERPL-SCNC: 111 MMOL/L (ref 98–112)
CO2 SERPL-SCNC: 19 MMOL/L (ref 21–32)
CREAT BLD-MCNC: 0.78 MG/DL
DEPRECATED RDW RBC AUTO: 42.7 FL (ref 35.1–46.3)
EOSINOPHIL # BLD AUTO: 0 X10(3) UL (ref 0–0.7)
EOSINOPHIL NFR BLD AUTO: 0 %
ERYTHROCYTE [DISTWIDTH] IN BLOOD BY AUTOMATED COUNT: 13.1 % (ref 11–15)
GFR SERPLBLD BASED ON 1.73 SQ M-ARVRAT: 90 ML/MIN/1.73M2 (ref 60–?)
GLUCOSE BLD-MCNC: 168 MG/DL (ref 70–99)
HCT VFR BLD AUTO: 43.4 %
HGB BLD-MCNC: 15.5 G/DL
IMM GRANULOCYTES # BLD AUTO: 0.1 X10(3) UL (ref 0–1)
IMM GRANULOCYTES NFR BLD: 0.7 %
LYMPHOCYTES # BLD AUTO: 0.99 X10(3) UL (ref 1–4)
LYMPHOCYTES NFR BLD AUTO: 6.6 %
MCH RBC QN AUTO: 31.5 PG (ref 26–34)
MCHC RBC AUTO-ENTMCNC: 35.7 G/DL (ref 31–37)
MCV RBC AUTO: 88.2 FL
MONOCYTES # BLD AUTO: 0.18 X10(3) UL (ref 0.1–1)
MONOCYTES NFR BLD AUTO: 1.2 %
NEUTROPHILS # BLD AUTO: 13.6 X10 (3) UL (ref 1.5–7.7)
NEUTROPHILS # BLD AUTO: 13.6 X10(3) UL (ref 1.5–7.7)
NEUTROPHILS NFR BLD AUTO: 91.4 %
OSMOLALITY SERPL CALC.SUM OF ELEC: 295 MOSM/KG (ref 275–295)
PLATELET # BLD AUTO: 208 10(3)UL (ref 150–450)
POTASSIUM SERPL-SCNC: 3.6 MMOL/L (ref 3.5–5.1)
RBC # BLD AUTO: 4.92 X10(6)UL
SODIUM SERPL-SCNC: 140 MMOL/L (ref 136–145)
WBC # BLD AUTO: 14.9 X10(3) UL (ref 4–11)

## 2023-06-07 PROCEDURE — 99233 SBSQ HOSP IP/OBS HIGH 50: CPT | Performed by: HOSPITALIST

## 2023-06-07 PROCEDURE — 99233 SBSQ HOSP IP/OBS HIGH 50: CPT | Performed by: INTERNAL MEDICINE

## 2023-06-07 PROCEDURE — 99232 SBSQ HOSP IP/OBS MODERATE 35: CPT | Performed by: SURGERY

## 2023-06-07 NOTE — PLAN OF CARE
Patient is alert and oriented x4. 2L NC. Vital signs stable. Tolerating clears. Denies pain or nausea. IVF continued. Solumedrol given. Up independently. Call light and personal belongings within reach. Safety precautions in place. Problem: Patient Centered Care  Goal: Patient preferences are identified and integrated in the patient's plan of care  Description: Interventions:  - What would you like us to know as we care for you?   - Provide timely, complete, and accurate information to patient/family  - Incorporate patient and family knowledge, values, beliefs, and cultural backgrounds into the planning and delivery of care  - Encourage patient/family to participate in care and decision-making at the level they choose  - Honor patient and family perspectives and choices  Outcome: Progressing     Problem: PAIN - ADULT  Goal: Verbalizes/displays adequate comfort level or patient's stated pain goal  Description: INTERVENTIONS:  - Encourage pt to monitor pain and request assistance  - Assess pain using appropriate pain scale  - Administer analgesics based on type and severity of pain and evaluate response  - Implement non-pharmacological measures as appropriate and evaluate response  - Consider cultural and social influences on pain and pain management  - Manage/alleviate anxiety  - Utilize distraction and/or relaxation techniques  - Monitor for opioid side effects  - Notify MD/LIP if interventions unsuccessful or patient reports new pain  - Anticipate increased pain with activity and pre-medicate as appropriate  Outcome: Progressing     Problem: SAFETY ADULT - FALL  Goal: Free from fall injury  Description: INTERVENTIONS:  - Assess pt frequently for physical needs  - Identify cognitive and physical deficits and behaviors that affect risk of falls.   - [place] fall precautions as indicated by assessment.  - Educate pt/family on patient safety including physical limitations  - Instruct pt to call for assistance with activity based on assessment  - Modify environment to reduce risk of injury  - Provide assistive devices as appropriate  - Consider OT/PT consult to assist with strengthening/mobility  - Encourage toileting schedule  Outcome: Progressing     Problem: DISCHARGE PLANNING  Goal: Discharge to home or other facility with appropriate resources  Description: INTERVENTIONS:  - Identify barriers to discharge w/pt and caregiver  - Include patient/family/discharge partner in discharge planning  - Arrange for needed discharge resources and transportation as appropriate  - Identify discharge learning needs (meds, wound care, etc)  - Arrange for interpreters to assist at discharge as needed  - Consider post-discharge preferences of patient/family/discharge partner  - Complete POLST form as appropriate  - Assess patient's ability to be responsible for managing their own health  - Refer to Case Management Department for coordinating discharge planning if the patient needs post-hospital services based on physician/LIP order or complex needs related to functional status, cognitive ability or social support system  Outcome: Progressing     Problem: GASTROINTESTINAL - ADULT  Goal: Minimal or absence of nausea and vomiting  Description: INTERVENTIONS:  - Maintain adequate hydration with IV or PO as ordered and tolerated  - Nasogastric tube to low intermittent suction as ordered  - Evaluate effectiveness of ordered antiemetic medications  - Provide nonpharmacologic comfort measures as appropriate  - Advance diet as tolerated, if ordered  - Obtain nutritional consult as needed  - Evaluate fluid balance  Outcome: Progressing

## 2023-06-08 ENCOUNTER — TELEPHONE (OUTPATIENT)
Facility: CLINIC | Age: 83
End: 2023-06-08

## 2023-06-08 VITALS
HEART RATE: 67 BPM | WEIGHT: 178.56 LBS | DIASTOLIC BLOOD PRESSURE: 77 MMHG | BODY MASS INDEX: 26.45 KG/M2 | RESPIRATION RATE: 16 BRPM | SYSTOLIC BLOOD PRESSURE: 142 MMHG | HEIGHT: 69 IN | OXYGEN SATURATION: 92 % | TEMPERATURE: 98 F

## 2023-06-08 LAB
ANION GAP SERPL CALC-SCNC: 8 MMOL/L (ref 0–18)
BASOPHILS # BLD AUTO: 0.03 X10(3) UL (ref 0–0.2)
BASOPHILS NFR BLD AUTO: 0.1 %
BUN BLD-MCNC: 25 MG/DL (ref 7–18)
BUN/CREAT SERPL: 30.1 (ref 10–20)
CALCIUM BLD-MCNC: 8.7 MG/DL (ref 8.5–10.1)
CHLORIDE SERPL-SCNC: 109 MMOL/L (ref 98–112)
CO2 SERPL-SCNC: 20 MMOL/L (ref 21–32)
CREAT BLD-MCNC: 0.83 MG/DL
DEPRECATED RDW RBC AUTO: 43.1 FL (ref 35.1–46.3)
EOSINOPHIL # BLD AUTO: 0 X10(3) UL (ref 0–0.7)
EOSINOPHIL NFR BLD AUTO: 0 %
ERYTHROCYTE [DISTWIDTH] IN BLOOD BY AUTOMATED COUNT: 13.2 % (ref 11–15)
GFR SERPLBLD BASED ON 1.73 SQ M-ARVRAT: 88 ML/MIN/1.73M2 (ref 60–?)
GLUCOSE BLD-MCNC: 145 MG/DL (ref 70–99)
HCT VFR BLD AUTO: 45.8 %
HGB BLD-MCNC: 16.1 G/DL
IMM GRANULOCYTES # BLD AUTO: 0.21 X10(3) UL (ref 0–1)
IMM GRANULOCYTES NFR BLD: 1 %
LYMPHOCYTES # BLD AUTO: 0.95 X10(3) UL (ref 1–4)
LYMPHOCYTES NFR BLD AUTO: 4.4 %
MAGNESIUM SERPL-MCNC: 2.3 MG/DL (ref 1.6–2.6)
MCH RBC QN AUTO: 31.1 PG (ref 26–34)
MCHC RBC AUTO-ENTMCNC: 35.2 G/DL (ref 31–37)
MCV RBC AUTO: 88.6 FL
MONOCYTES # BLD AUTO: 0.46 X10(3) UL (ref 0.1–1)
MONOCYTES NFR BLD AUTO: 2.1 %
NEUTROPHILS # BLD AUTO: 19.86 X10 (3) UL (ref 1.5–7.7)
NEUTROPHILS # BLD AUTO: 19.86 X10(3) UL (ref 1.5–7.7)
NEUTROPHILS NFR BLD AUTO: 92.4 %
OSMOLALITY SERPL CALC.SUM OF ELEC: 291 MOSM/KG (ref 275–295)
PLATELET # BLD AUTO: 226 10(3)UL (ref 150–450)
POTASSIUM SERPL-SCNC: 3.8 MMOL/L (ref 3.5–5.1)
RBC # BLD AUTO: 5.17 X10(6)UL
SODIUM SERPL-SCNC: 137 MMOL/L (ref 136–145)
WBC # BLD AUTO: 21.5 X10(3) UL (ref 4–11)

## 2023-06-08 PROCEDURE — 99239 HOSP IP/OBS DSCHRG MGMT >30: CPT | Performed by: HOSPITALIST

## 2023-06-08 PROCEDURE — 99232 SBSQ HOSP IP/OBS MODERATE 35: CPT | Performed by: SURGERY

## 2023-06-08 RX ORDER — PREDNISONE 20 MG/1
TABLET ORAL
Qty: 6 TABLET | Refills: 0 | Status: SHIPPED | OUTPATIENT
Start: 2023-06-08 | End: 2023-06-13

## 2023-06-08 NOTE — PLAN OF CARE
Pt A&Ox4. Room air. Solumedrol IV q8h. Abdominal distention improving. No BM this shift, +gas. Tolerating low fiber/soft diet. Saline locked. Ambulating ad dilcia. Problem: Patient Centered Care  Goal: Patient preferences are identified and integrated in the patient's plan of care  Description: Interventions:  - What would you like us to know as we care for you?  From home with wife Leesa Sams   - Provide timely, complete, and accurate information to patient/family  - Incorporate patient and family knowledge, values, beliefs, and cultural backgrounds into the planning and delivery of care  - Encourage patient/family to participate in care and decision-making at the level they choose  - Honor patient and family perspectives and choices  Outcome: Progressing     Problem: Patient/Family Goals  Goal: Patient/Family Long Term Goal  Description: Patient's Long Term Goal:     Interventions:  - See additional Care Plan goals for specific interventions  Outcome: Progressing  Goal: Patient/Family Short Term Goal  Description: Patient's Short Term Goal:     Interventions:   - See additional Care Plan goals for specific interventions  Outcome: Progressing     Problem: PAIN - ADULT  Goal: Verbalizes/displays adequate comfort level or patient's stated pain goal  Description: INTERVENTIONS:  - Encourage pt to monitor pain and request assistance  - Assess pain using appropriate pain scale  - Administer analgesics based on type and severity of pain and evaluate response  - Implement non-pharmacological measures as appropriate and evaluate response  - Consider cultural and social influences on pain and pain management  - Manage/alleviate anxiety  - Utilize distraction and/or relaxation techniques  - Monitor for opioid side effects  - Notify MD/LIP if interventions unsuccessful or patient reports new pain  - Anticipate increased pain with activity and pre-medicate as appropriate  Outcome: Progressing     Problem: SAFETY ADULT - FALL  Goal: Free from fall injury  Description: INTERVENTIONS:  - Assess pt frequently for physical needs  - Identify cognitive and physical deficits and behaviors that affect risk of falls.   - [place] fall precautions as indicated by assessment.  - Educate pt/family on patient safety including physical limitations  - Instruct pt to call for assistance with activity based on assessment  - Modify environment to reduce risk of injury  - Provide assistive devices as appropriate  - Consider OT/PT consult to assist with strengthening/mobility  - Encourage toileting schedule  Outcome: Progressing     Problem: DISCHARGE PLANNING  Goal: Discharge to home or other facility with appropriate resources  Description: INTERVENTIONS:  - Identify barriers to discharge w/pt and caregiver  - Include patient/family/discharge partner in discharge planning  - Arrange for needed discharge resources and transportation as appropriate  - Identify discharge learning needs (meds, wound care, etc)  - Arrange for interpreters to assist at discharge as needed  - Consider post-discharge preferences of patient/family/discharge partner  - Complete POLST form as appropriate  - Assess patient's ability to be responsible for managing their own health  - Refer to Case Management Department for coordinating discharge planning if the patient needs post-hospital services based on physician/LIP order or complex needs related to functional status, cognitive ability or social support system  Outcome: Progressing     Problem: GASTROINTESTINAL - ADULT  Goal: Minimal or absence of nausea and vomiting  Description: INTERVENTIONS:  - Maintain adequate hydration with IV or PO as ordered and tolerated  - Nasogastric tube to low intermittent suction as ordered  - Evaluate effectiveness of ordered antiemetic medications  - Provide nonpharmacologic comfort measures as appropriate  - Advance diet as tolerated, if ordered  - Obtain nutritional consult as needed  - Evaluate fluid balance  Outcome: Progressing

## 2023-06-08 NOTE — TELEPHONE ENCOUNTER
The patient called needing a follow-up appointment after being released from the hospital within a week.

## 2023-06-08 NOTE — PLAN OF CARE
Problem: Patient Centered Care  Goal: Patient preferences are identified and integrated in the patient's plan of care  Description: Interventions:  - What would you like us to know as we care for you?   - Provide timely, complete, and accurate information to patient/family  - Incorporate patient and family knowledge, values, beliefs, and cultural backgrounds into the planning and delivery of care  - Encourage patient/family to participate in care and decision-making at the level they choose  - Honor patient and family perspectives and choices  Outcome: Completed     Problem: Patient/Family Goals  Goal: Patient/Family Long Term Goal  Description: Patient's Long Term Goal:     Interventions:  -   - See additional Care Plan goals for specific interventions  Outcome: Completed  Goal: Patient/Family Short Term Goal  Description: Patient's Short Term Goal:     Interventions:   -   - See additional Care Plan goals for specific interventions  Outcome: Completed     Problem: PAIN - ADULT  Goal: Verbalizes/displays adequate comfort level or patient's stated pain goal  Description: INTERVENTIONS:  - Encourage pt to monitor pain and request assistance  - Assess pain using appropriate pain scale  - Administer analgesics based on type and severity of pain and evaluate response  - Implement non-pharmacological measures as appropriate and evaluate response  - Consider cultural and social influences on pain and pain management  - Manage/alleviate anxiety  - Utilize distraction and/or relaxation techniques  - Monitor for opioid side effects  - Notify MD/LIP if interventions unsuccessful or patient reports new pain  - Anticipate increased pain with activity and pre-medicate as appropriate  Outcome: Completed     Problem: SAFETY ADULT - FALL  Goal: Free from fall injury  Description: INTERVENTIONS:  - Assess pt frequently for physical needs  - Identify cognitive and physical deficits and behaviors that affect risk of falls.   - [place] fall precautions as indicated by assessment.  - Educate pt/family on patient safety including physical limitations  - Instruct pt to call for assistance with activity based on assessment  - Modify environment to reduce risk of injury  - Provide assistive devices as appropriate  - Consider OT/PT consult to assist with strengthening/mobility  - Encourage toileting schedule  Outcome: Completed     Problem: DISCHARGE PLANNING  Goal: Discharge to home or other facility with appropriate resources  Description: INTERVENTIONS:  - Identify barriers to discharge w/pt and caregiver  - Include patient/family/discharge partner in discharge planning  - Arrange for needed discharge resources and transportation as appropriate  - Identify discharge learning needs (meds, wound care, etc)  - Arrange for interpreters to assist at discharge as needed  - Consider post-discharge preferences of patient/family/discharge partner  - Complete POLST form as appropriate  - Assess patient's ability to be responsible for managing their own health  - Refer to Case Management Department for coordinating discharge planning if the patient needs post-hospital services based on physician/LIP order or complex needs related to functional status, cognitive ability or social support system  Outcome: Completed     Problem: GASTROINTESTINAL - ADULT  Goal: Minimal or absence of nausea and vomiting  Description: INTERVENTIONS:  - Maintain adequate hydration with IV or PO as ordered and tolerated  - Nasogastric tube to low intermittent suction as ordered  - Evaluate effectiveness of ordered antiemetic medications  - Provide nonpharmacologic comfort measures as appropriate  - Advance diet as tolerated, if ordered  - Obtain nutritional consult as needed  - Evaluate fluid balance  Outcome: Completed

## 2023-06-09 ENCOUNTER — PATIENT OUTREACH (OUTPATIENT)
Dept: CASE MANAGEMENT | Age: 83
End: 2023-06-09

## 2023-06-09 ENCOUNTER — TELEPHONE (OUTPATIENT)
Facility: CLINIC | Age: 83
End: 2023-06-09

## 2023-06-09 DIAGNOSIS — K56.609 INTESTINAL OBSTRUCTION, UNSPECIFIED CAUSE, UNSPECIFIED WHETHER PARTIAL OR COMPLETE (HCC): ICD-10-CM

## 2023-06-09 DIAGNOSIS — Z02.9 ENCOUNTERS FOR UNSPECIFIED ADMINISTRATIVE PURPOSE: ICD-10-CM

## 2023-06-09 PROCEDURE — 1159F MED LIST DOCD IN RCRD: CPT

## 2023-06-09 PROCEDURE — 1111F DSCHRG MED/CURRENT MED MERGE: CPT

## 2023-06-09 NOTE — PROGRESS NOTES
Attempted to reach the patient to complete a [facility] FU call. Left a message for the pt to call the [facility] back at, [phone]. The number for the [facility] was also given to the patient to schedule at, [phone].

## 2023-06-09 NOTE — TELEPHONE ENCOUNTER
Spoke with pt today for TCM--relayed Dr. Kirsty Tenorio recommendations below--patient verbalizes understanding and agreement--will complete prednisone taper, as prescribed--hiccups are improving with ice chips. Pt confirms 6/13/2023 TCM/HFU appt with Dr. Cherry Beck to decline COPD clinic appt. No further questions/concerns at this time. Future Appointments   Date Time Provider Urszula Tobin   6/13/2023  6:30 PM Amelia Lopez [facility]         Follow up appointments:    Start   Ordered   06/07/23 0759  Follow up for COPD/Pneumonia  (Post-Discharge Follow-Up Orders)  Once     Completed     Priority: Routine    Question Answer Comment   Patient to be seen for: COPD    Where to follow-up for COPD/Pneumonia?  [address]        06/07/23 0758         Follow-up Information    Follow up With Specialties Details Why Contact Info   Evita Bolivar, DO Family Medicine Follow up in 1 week(s)  [id number] [address]

## 2023-06-09 NOTE — TELEPHONE ENCOUNTER
Did it start when he started the Prednisone? That can be a common cause and should resolve after he completes the course.

## 2023-06-09 NOTE — TELEPHONE ENCOUNTER
Per daughter pt has intermittent hiccups for over 24 hours, pt has tried chewing on ice, holding his breath, and breathing in and out of a paper bag. Per daughter Miah Ames said he this happened to him many years ago and he received an injection\"    Pt asking if any medication can help with hiccups. Please advise.

## 2023-06-13 ENCOUNTER — OFFICE VISIT (OUTPATIENT)
Facility: CLINIC | Age: 83
End: 2023-06-13
Payer: COMMERCIAL

## 2023-06-13 VITALS
SYSTOLIC BLOOD PRESSURE: 122 MMHG | WEIGHT: 136 LBS | DIASTOLIC BLOOD PRESSURE: 74 MMHG | HEIGHT: 69 IN | BODY MASS INDEX: 20.14 KG/M2 | OXYGEN SATURATION: 96 % | HEART RATE: 78 BPM

## 2023-06-13 DIAGNOSIS — R06.6 HICCUPS: ICD-10-CM

## 2023-06-13 DIAGNOSIS — F17.200 TOBACCO DEPENDENCE: ICD-10-CM

## 2023-06-13 DIAGNOSIS — J44.1 COPD EXACERBATION (HCC): Primary | ICD-10-CM

## 2023-06-13 DIAGNOSIS — K21.9 GASTROESOPHAGEAL REFLUX DISEASE, UNSPECIFIED WHETHER ESOPHAGITIS PRESENT: ICD-10-CM

## 2023-06-13 PROCEDURE — 1111F DSCHRG MED/CURRENT MED MERGE: CPT | Performed by: FAMILY MEDICINE

## 2023-06-13 PROCEDURE — 3078F DIAST BP <80 MM HG: CPT | Performed by: FAMILY MEDICINE

## 2023-06-13 PROCEDURE — 3074F SYST BP LT 130 MM HG: CPT | Performed by: FAMILY MEDICINE

## 2023-06-13 PROCEDURE — 3008F BODY MASS INDEX DOCD: CPT | Performed by: FAMILY MEDICINE

## 2023-06-13 PROCEDURE — 99496 TRANSJ CARE MGMT HIGH F2F 7D: CPT | Performed by: FAMILY MEDICINE

## 2023-06-13 RX ORDER — FAMOTIDINE 20 MG/1
20 TABLET, FILM COATED ORAL 2 TIMES DAILY
Qty: 90 TABLET | Refills: 0 | Status: SHIPPED | OUTPATIENT
Start: 2023-06-13

## 2023-06-15 ENCOUNTER — NURSE TRIAGE (OUTPATIENT)
Dept: FAMILY MEDICINE CLINIC | Facility: CLINIC | Age: 83
End: 2023-06-15

## 2023-06-15 RX ORDER — CHLORPROMAZINE HYDROCHLORIDE 25 MG/1
25 TABLET, FILM COATED ORAL EVERY 6 HOURS PRN
Qty: 30 TABLET | Refills: 0 | Status: SHIPPED | OUTPATIENT
Start: 2023-06-15

## 2023-06-15 NOTE — TELEPHONE ENCOUNTER
Daughter Aviva(on hipaa) indicated that patient has had the hiccups for 6 days now. Saw Dr Mundo Driscoll on 6/13/2023. Pharmacy recommended chlorpromazine 25mg for the hiccups. Wanted to get a prescription. Stated hiccups are very bothersome. Please advise. Rx pended.

## 2023-06-15 NOTE — TELEPHONE ENCOUNTER
Daughter calling to follow up on message below. Has not been able to get any sleep due to the hiccups. Please advise.

## 2023-06-22 RX ORDER — AMLODIPINE BESYLATE 10 MG/1
10 TABLET ORAL DAILY
Qty: 90 TABLET | Refills: 3 | Status: SHIPPED | OUTPATIENT
Start: 2023-06-22

## 2023-06-23 NOTE — TELEPHONE ENCOUNTER
Refill passed per [facility] protocol   Requested Prescriptions   Pending Prescriptions Disp Refills    amLODIPine 10 MG Oral Tab 90 tablet 3     Sig: Take 1 tablet (10 mg total) by mouth daily. Hypertensive Medications Protocol Passed - 6/22/2023  5:10 PM        Passed - In person appointment in the past 12 or next 3 months     Recent Outpatient Visits              1 week ago COPD exacerbation ([address]    [address] [facility], [address] Ledy Tobin, DO    Office Visit    3 months ago Encounter for annual health examination    [address] [facility], [address] Ledy Joineris, DO    Office Visit    6 months ago COPD exacerbation [facility])    [address] Ledy Tobin, DO    Office Visit    1 year ago Encounter for annual health examination    [address] Ledy Tobin, DO    Office Visit    1 year ago Carpal tunnel syndrome of left wrist    [facility], [address], [address] Ledy Tobin, DO    Office Visit                      Passed - Last BP reading less than 140/90     BP Readings from Last 1 Encounters:  06/13/23 : 122/74              Passed - CMP or BMP in past 6 months     Recent Results (from the past 4392 hour(s))   Basic Metabolic Panel (8)    Collection Time: 06/08/23  7:13 AM   Result Value Ref Range    Glucose 145 (H) 70 - 99 mg/dL    Sodium 137 136 - 145 mmol/L    Potassium 3.8 3.5 - 5.1 mmol/L    Chloride 109 98 - 112 mmol/L    CO2 20.0 (L) 21.0 - 32.0 mmol/L    Anion Gap 8 0 - 18 mmol/L    BUN 25 (H) 7 - 18 mg/dL    Creatinine 0.83 0.70 - 1.30 mg/dL    BUN/CREA Ratio 30.1 (H) 10.0 - 20.0    Calcium, Total 8.7 8.5 - 10.1 mg/dL    Calculated Osmolality 291 275 - 295 mOsm/kg    eGFR-Cr 88 >=60 mL/min/1.73m2     *Note: Due to a large number of results and/or encounters for the requested time period, some results have not been displayed.  A complete set of results can be found in Results Review.                Passed - In person appointment or virtual visit in the past 6 months     Recent Outpatient Visits              1 week ago COPD exacerbation [facility])    [address] [facility], [facility] [address] [place], [place]    Office Visit    3 months ago Encounter for annual health examination    [address] [facility], [facility] [address] [place], [place]    Office Visit    6 months ago COPD exacerbation [facility])    [address] Chloe Calvo,     Office Visit    1 year ago Encounter for annual health examination    [address] [facility], [address] Gracie Salguero, DO    Office Visit    1 year ago Carpal tunnel syndrome of left wrist    [facility], [address], [address] Gracie Salguero, DO    Office Visit                      Passed - EGFRCR or GFRNAA > 50     GFR Evaluation  EGFRCR: 88 , resulted on 6/8/2023

## 2024-02-08 ENCOUNTER — TELEPHONE (OUTPATIENT)
Facility: CLINIC | Age: 84
End: 2024-02-08

## 2024-02-08 NOTE — TELEPHONE ENCOUNTER
Outgoing call to patient and patient stated he will call back for the appointment and doesn't want to schedule at this time .    FYI

## 2024-04-09 ENCOUNTER — OFFICE VISIT (OUTPATIENT)
Facility: CLINIC | Age: 84
End: 2024-04-09
Payer: COMMERCIAL

## 2024-04-09 VITALS
DIASTOLIC BLOOD PRESSURE: 76 MMHG | BODY MASS INDEX: 20.29 KG/M2 | HEART RATE: 84 BPM | OXYGEN SATURATION: 98 % | SYSTOLIC BLOOD PRESSURE: 122 MMHG | HEIGHT: 69 IN | WEIGHT: 137 LBS

## 2024-04-09 DIAGNOSIS — R91.8 PULMONARY NODULES: ICD-10-CM

## 2024-04-09 DIAGNOSIS — Z87.442 HISTORY OF NEPHROLITHIASIS: ICD-10-CM

## 2024-04-09 DIAGNOSIS — I10 ESSENTIAL HYPERTENSION: ICD-10-CM

## 2024-04-09 DIAGNOSIS — F17.200 TOBACCO DEPENDENCE: ICD-10-CM

## 2024-04-09 DIAGNOSIS — R73.9 ELEVATED SERUM GLUCOSE: ICD-10-CM

## 2024-04-09 DIAGNOSIS — J43.2 CENTRILOBULAR EMPHYSEMA (HCC): ICD-10-CM

## 2024-04-09 DIAGNOSIS — Z87.19 HISTORY OF SMALL BOWEL OBSTRUCTION: ICD-10-CM

## 2024-04-09 DIAGNOSIS — Z00.00 ENCOUNTER FOR ANNUAL HEALTH EXAMINATION: Primary | ICD-10-CM

## 2024-04-09 PROBLEM — K52.9 GASTROENTERITIS: Status: RESOLVED | Noted: 2023-06-06 | Resolved: 2024-04-09

## 2024-04-09 PROBLEM — J44.1 COPD EXACERBATION (HCC): Status: RESOLVED | Noted: 2023-06-06 | Resolved: 2024-04-09

## 2024-04-09 PROBLEM — K56.609 BOWEL OBSTRUCTION (HCC): Status: RESOLVED | Noted: 2023-06-06 | Resolved: 2024-04-09

## 2024-04-09 PROBLEM — I95.1 ORTHOSTATIC HYPOTENSION: Status: RESOLVED | Noted: 2020-02-17 | Resolved: 2024-04-09

## 2024-04-09 PROCEDURE — 3008F BODY MASS INDEX DOCD: CPT | Performed by: FAMILY MEDICINE

## 2024-04-09 PROCEDURE — 96160 PT-FOCUSED HLTH RISK ASSMT: CPT | Performed by: FAMILY MEDICINE

## 2024-04-09 PROCEDURE — 1159F MED LIST DOCD IN RCRD: CPT | Performed by: FAMILY MEDICINE

## 2024-04-09 PROCEDURE — 3078F DIAST BP <80 MM HG: CPT | Performed by: FAMILY MEDICINE

## 2024-04-09 PROCEDURE — 1126F AMNT PAIN NOTED NONE PRSNT: CPT | Performed by: FAMILY MEDICINE

## 2024-04-09 PROCEDURE — 3074F SYST BP LT 130 MM HG: CPT | Performed by: FAMILY MEDICINE

## 2024-04-09 PROCEDURE — 1160F RVW MEDS BY RX/DR IN RCRD: CPT | Performed by: FAMILY MEDICINE

## 2024-04-09 PROCEDURE — 99214 OFFICE O/P EST MOD 30 MIN: CPT | Performed by: FAMILY MEDICINE

## 2024-04-09 PROCEDURE — 1170F FXNL STATUS ASSESSED: CPT | Performed by: FAMILY MEDICINE

## 2024-04-09 PROCEDURE — G0439 PPPS, SUBSEQ VISIT: HCPCS | Performed by: FAMILY MEDICINE

## 2024-04-09 NOTE — PATIENT INSTRUCTIONS
[facility] Dyer's SCREENING SCHEDULE   Tests on this list are recommended by your physician but may not be covered, or covered at this frequency, by your insurer.   Please check with your insurance carrier before scheduling to verify coverage.   PREVENTATIVE SERVICES FREQUENCY &  COVERAGE DETAILS LAST COMPLETION DATE   Diabetes Screening    Fasting Blood Sugar / Glucose    One screening every 12 months if never tested or if previously tested but not diagnosed with pre-diabetes   One screening every 6 months if diagnosed with pre-diabetes Lab Results   Component Value Date     (H) 06/08/2023        Cardiovascular Disease Screening    Lipid Panel  Cholesterol  Lipoprotein (HDL)  Triglycerides Covered every 5 years for all Medicare beneficiaries without apparent signs or symptoms of cardiovascular disease Lab Results   Component Value Date    CHOLEST 171 02/24/2020    HDL 38 (L) 02/24/2020     (H) 02/24/2020    TRIG 135 02/24/2020         Electrocardiogram (EKG)   Covered if needed at Welcome to Medicare, and non-screening if indicated for medical reasons -      Ultrasound Screening for Abdominal Aortic Aneurysm (AAA) Covered once in a lifetime for one of the following risk factors   • Men who are 65-75 years old and have ever smoked   • Anyone with a family history -     Colorectal Cancer Screening  Covered for ages 50-85; only need ONE of the following:    Colonoscopy   Covered every 10 years    Covered every 2 years if patient is at high risk or previous colonoscopy was abnormal -    No recommendations at this time    Flexible Sigmoidoscopy   Covered every 4 years -    Fecal Occult Blood Test Covered annually -   Prostate Cancer Screening    Prostate-Specific Antigen (PSA) Annually No results found for: \"PSA\"  There are no preventive care reminders to display for this patient.   Immunizations    Influenza Covered once per flu season  Please get every year -  No recommendations at this time     Pneumococcal Each vaccine ([id number] & [id number]) covered once after 65 Prevnar 13: -    [id number]: -     Pneumococcal Vaccination(1 of 2 - PCV) Never done    Hepatitis B One screening covered for patients with certain risk factors   -  No recommendations at this time    Tetanus Toxoid Not covered by Medicare Part B unless medically necessary (cut with metal); may be covered with your pharmacy prescription benefits -    Tetanus, Diptheria and Pertusis TD and TDaP Not covered by Medicare Part B -  No recommendations at this time    Zoster Not covered by Medicare Part B; may be covered with your pharmacy  prescription benefits -  Zoster Vaccines(1 of 2) Never done     Chronic Obstructive Pulmonary Disease (COPD)    Spirometry Annually Spirometry date:

## 2024-04-09 NOTE — PROGRESS NOTES
Subjective:   Robert Dyer is a 83 year old male who presents for a MA (Medicare Advantage) Supervisit (Once per calendar year) and scheduled follow up of multiple significant but stable problems.     HTN: Taking amlodipine 10mg daily.    Emphysema: Still smoking. No changes since last visit. Rarely takes albuterol. Not taking Anoro inhaler. Never filled 2/2 expense. Never scheduled appt with COPD clinic. Using albuterol 1-2x/wk.      Hx of nephrolithiasis: No current concerns.      Last colonoscopy: A few years ago at [facility] and normal per patient. Never received records.   Last PSA: years ago and normal.   Last low-dose CT chest: 03/2023 showing severe emphysema  Abdominal ultrasound for AAA screening: Completed at [facility] per patient.   Diet & Exercise: Diet has been good. No regular exercise. On feet all day at work. Works 7 days/wk.   Vaccines: Unsure if he has received pneumococcal or shingles vaccines in the past, but declines today.    History/Other:   Fall Risk Assessment:   He has been screened for Falls and is low risk.      Cognitive Assessment:   He had a completely normal cognitive assessment - see flowsheet entries       Functional Ability/Status:   Robert Dyer has some abnormal functions as listed below:  He has Hearing problems based on screening of functional status.He has Vision problems based on screening of functional status.       Depression Screening (PHQ-2/PHQ-9): PHQ-2 SCORE: 0  , done 4/9/2024             Advanced Directives:   He does NOT have a Living Will. [Do you have a living will?: No]  He does NOT have a Power of  for Health Care. [Do you have a healthcare power of ?: No]  Not discussed      Patient Active Problem List   Diagnosis    Essential hypertension    Tobacco dependence    History of nephrolithiasis    Centrilobular emphysema (HCC)    Pulmonary nodules    History of small bowel obstruction     Allergies:  He is allergic to eggs or egg-derived  products.    Current Medications:  Outpatient Medications Marked as Taking for the 4/9/24 encounter (Office Visit) with Andrésleticia Hadley, DO   Medication Sig    amLODIPine 10 MG Oral Tab Take 1 tablet (10 mg total) by mouth daily.    famotidine 20 MG Oral Tab Take 1 tablet (20 mg total) by mouth 2 (two) times daily.    albuterol 108 (90 Base) MCG/ACT Inhalation Aero Soln Inhale 2 puffs into the lungs every 6 (six) hours as needed for Wheezing.       Medical History:  He  has a past medical history of Allergic rhinitis, Centrilobular emphysema (HCC) (02/17/2020), Essential hypertension, History of nephrolithiasis, and Tobacco dependence (1952).  Surgical History:  He  has no past surgical history on file.   Family History:  His family history includes Cancer in his father.  Social History:  He  reports that he has been smoking cigarettes. He started smoking about 70 years ago. He has been smoking an average of 3 packs per day. He has never used smokeless tobacco. He reports that he does not currently use alcohol. He reports that he does not use drugs.    Tobacco:  He currently smokes tobacco.  Social History    Tobacco Use      Smoking status: Every Day        Types: Cigarettes      Smokeless tobacco: Never      Tobacco comments: 3 PPD smoker since 17yoa.     Tobacco Cessation Documentation (Smoking and Smokeless included):  I had an in depth therapy session with Robert Dyer about his tobacco use risks and options using the USPSTF's Five A's approach:    Ask: Robert is using tobacco products.  Assess: We asked about/assessed behavioral health risk and factors affecting choice of behavior change goals/methods.  Specifically I asked about readiness to quit tobacco.  Advise: We gave clear, specific, and personalized behavior change advice, including information about personal health harms and benefits. Specifically, he was told that Quitting tobacco is one of the best things he can do for his health. I strongly  encouraged him to quit.      Agree: We collaboratively selected appropriate treatment goals and methods based on the patient’s interest in and willingness to change the behavior.   Assist: We used behavior change techniques (self-help and/or counseling), to aid Robert in achieving agreed-upon goals by acquiring the skills, confidence, and social/environmental supports for behavior change, supplemented with adjunctive medical treatments when appropriate. Additionally, national quitting tobacco aides were discussed.   Arrange: Follow up at next visit- see specific follow up below.    Time Counseled: 3 minutes       CAGE Alcohol Screen:   CAGE screening score of 0 on 4/9/2024, showing low risk of alcohol abuse.      Patient Care Team:  Hadley Marcus DO as PCP - General (Family Medicine)    Review of Systems  GENERAL: feels well otherwise. See HPI  SKIN: denies any unusual skin lesions  EYES: denies blurred vision or double vision  HEENT: denies nasal congestion, sinus pain or ST  LUNGS: denies shortness of breath with exertion  CARDIOVASCULAR: denies chest pain on exertion  GI: denies abdominal pain, denies heartburn  : no complaint of urinary incontinence  MUSCULOSKELETAL: denies back pain  NEURO: denies headaches  PSYCHE: denies depression or anxiety  HEMATOLOGIC: denies hx of anemia  ENDOCRINE: denies thyroid history  ALL/ASTHMA: negative    Objective:   Physical Exam  General Appearance:  Alert, cooperative, no distress, appears stated age   Head:  Normocephalic, without obvious abnormality, atraumatic   Eyes:  PERRL, conjunctiva/corneas clear, EOM's intact, both eyes   Ears:  Normal TM's and external ear canals, both ears   Nose: Nares normal, septum midline, mucosa normal, no drainage or sinus tenderness   Throat: Lips, mucosa, and tongue normal; teeth and gums normal   Neck: Supple, symmetrical, trachea midline, no adenopathy, thyroid: not enlarged, symmetric, no tenderness/mass/nodules, no carotid bruit or  JVD   Back:   Symmetric, no curvature, ROM normal, no CVA tenderness   Lungs:   Clear to auscultation bilaterally, respirations unlabored   Chest Wall:  No tenderness or deformity   Heart:  Regular rate and rhythm, S1, S2 normal, no murmur, rub or gallop   Abdomen:   Soft, non-tender, bowel sounds active all four quadrants,  no masses, no organomegaly           Extremities: Extremities normal, atraumatic, no cyanosis or edema   Pulses: 2+ and symmetric   Skin: Skin color, texture, turgor normal, no rashes or lesions   Lymph nodes: Cervical, supraclavicular, and axillary nodes normal   Neurologic: Normal     /76   Pulse 84   Ht 5' 9\" (1.753 m)   Wt 137 lb (62.1 kg)   SpO2 98%   BMI 20.23 kg/m²  Estimated body mass index is 20.23 kg/m² as calculated from the following:    Height as of this encounter: 5' 9\" (1.753 m).    Weight as of this encounter: 137 lb (62.1 kg).    Medicare Hearing Assessment:   Hearing Screening    Screening Method: Finger Rub  Finger Rub Result: Pass         Visual Acuity:   Right Eye Visual Acuity: Corrected Right Eye Chart Acuity: 20/50   Left Eye Visual Acuity: Corrected Left Eye Chart Acuity: 20/70   Both Eyes Visual Acuity: Corrected Both Eyes Chart Acuity: 20/50   Able To Tolerate Visual Acuity: Yes        Assessment & Plan:   Robert Dyer is a 83 year old male who presents for a Medicare Assessment.     1. Encounter for annual health examination (Primary)  -     Hemoglobin A1C  -     CBC With Differential With Platelet  -     Comp Metabolic Panel (14)  -     Lipid Panel  -     PSA Total, Screen; Future; Expected date: 04/09/2024  2. Essential hypertension  -     CBC With Differential With Platelet  -     Comp Metabolic Panel (14)  3. Centrilobular emphysema (HCC)  4. Pulmonary nodules  -     CT LUNG LD SCREENING(CPT=71271); Future; Expected date: 04/09/2024  5. History of nephrolithiasis  6. Tobacco dependence  -     CT LUNG LD SCREENING(CPT=71271); Future; Expected date:  04/09/2024  7. History of small bowel obstruction  8. Elevated serum glucose  -     Hemoglobin A1C    -HTN: Well-controlled. CPM. F/u labs  -Emphysema: Smoking cessation. Continue albuterol prn for now. However, urged to call if albuterol requirement increases at all and will then start controller inhaler (Anoro was too expensive).   -Pulmonary nodules: Overdue for LDCT and will schedule.  -Tobacco dependence: Counseling provided.   -Hx of nephrolithiasis: No concerns.   -Prostate CA screening: F/u PSA.  -Hx of SBO: No current concerns.   -Elev serum glucose: Check A1c  -Annual labs ordered.   -Need to obtain records of last colonoscopy & AAA screening.   -Declines vaccines today.   -RTC in 6 months for f/u, or sooner prn.        The patient indicates understanding of these issues and agrees to the plan.  Imaging studies ordered.  Lab work ordered.  Reinforced healthy diet, lifestyle, and exercise.      No follow-ups on file.     Hadley Marcus DO, 4/9/2024     Supplementary Documentation:   General Health:  In the past six months, have you lost more than 10 pounds without trying?: 2 - No  Has your appetite been poor?: No  Type of Diet: Balanced  How does the patient maintain a good energy level?: Daily Walks;Stretching  How would you describe your daily physical activity?: Moderate  How would you describe your current health state?: Good  How do you maintain positive mental well-being?: Puzzles;Visiting Friends;Visiting Family  On a scale of 0 to 10, with 0 being no pain and 10 being severe pain, what is your pain level?: 0 - (None)  In the past six months, have you experienced urine leakage?: 0-No  At any time do you feel concerned for the safety/well-being of yourself and/or your children, in your home or elsewhere?: No  Have you had any immunizations at another office such as Influenza, Hepatitis B, Tetanus, or Pneumococcal?: No          Robert Dyer's SCREENING SCHEDULE   Tests on this list are  recommended by your physician but may not be covered, or covered at this frequency, by your insurer.   Please check with your insurance carrier before scheduling to verify coverage.   PREVENTATIVE SERVICES FREQUENCY &  COVERAGE DETAILS LAST COMPLETION DATE   Diabetes Screening    Fasting Blood Sugar / Glucose    One screening every 12 months if never tested or if previously tested but not diagnosed with pre-diabetes   One screening every 6 months if diagnosed with pre-diabetes Lab Results   Component Value Date     (H) 06/08/2023        Cardiovascular Disease Screening    Lipid Panel  Cholesterol  Lipoprotein (HDL)  Triglycerides Covered every 5 years for all Medicare beneficiaries without apparent signs or symptoms of cardiovascular disease Lab Results   Component Value Date    CHOLEST 171 02/24/2020    HDL 38 (L) 02/24/2020     (H) 02/24/2020    TRIG 135 02/24/2020         Electrocardiogram (EKG)   Covered if needed at Welcome to Medicare, and non-screening if indicated for medical reasons -      Ultrasound Screening for Abdominal Aortic Aneurysm (AAA) Covered once in a lifetime for one of the following risk factors    Men who are 65-75 years old and have ever smoked    Anyone with a family history -     Colorectal Cancer Screening  Covered for ages 50-85; only need ONE of the following:    Colonoscopy   Covered every 10 years    Covered every 2 years if patient is at high risk or previous colonoscopy was abnormal -    No recommendations at this time    Flexible Sigmoidoscopy   Covered every 4 years -    Fecal Occult Blood Test Covered annually -   Prostate Cancer Screening    Prostate-Specific Antigen (PSA) Annually No results found for: \"PSA\"  There are no preventive care reminders to display for this patient.   Immunizations    Influenza Covered once per flu season  Please get every year -  No recommendations at this time    Pneumococcal Each vaccine ([id number] & [id number]) covered once after 65  Prevnar 13: -    [id number]: -     Pneumococcal Vaccination(1 of 2 - PCV) Never done    Hepatitis B One screening covered for patients with certain risk factors   -  No recommendations at this time    Tetanus Toxoid Not covered by Medicare Part B unless medically necessary (cut with metal); may be covered with your pharmacy prescription benefits -    Tetanus, Diptheria and Pertusis TD and TDaP Not covered by Medicare Part B -  No recommendations at this time    Zoster Not covered by Medicare Part B; may be covered with your pharmacy  prescription benefits -  Zoster Vaccines(1 of 2) Never done     Chronic Obstructive Pulmonary Disease (COPD)    Spirometry Annually Spirometry date:

## 2024-04-26 ENCOUNTER — APPOINTMENT (OUTPATIENT)
Dept: GENERAL RADIOLOGY | Age: 84
End: 2024-04-26
Attending: NURSE PRACTITIONER
Payer: MEDICARE

## 2024-04-26 ENCOUNTER — HOSPITAL ENCOUNTER (OUTPATIENT)
Age: 84
Discharge: HOME | End: 2024-04-26
Payer: MEDICARE

## 2024-04-26 ENCOUNTER — NURSE TRIAGE (OUTPATIENT)
Facility: CLINIC | Age: 84
End: 2024-04-26

## 2024-04-26 VITALS
RESPIRATION RATE: 20 BRPM | SYSTOLIC BLOOD PRESSURE: 129 MMHG | DIASTOLIC BLOOD PRESSURE: 63 MMHG | TEMPERATURE: 98 F | OXYGEN SATURATION: 95 % | HEART RATE: 61 BPM

## 2024-04-26 DIAGNOSIS — Z20.822 ENCOUNTER FOR LABORATORY TESTING FOR COVID-19 VIRUS: ICD-10-CM

## 2024-04-26 DIAGNOSIS — J18.9 PNEUMONIA DUE TO INFECTIOUS ORGANISM, UNSPECIFIED LATERALITY, UNSPECIFIED PART OF LUNG: Primary | ICD-10-CM

## 2024-04-26 DIAGNOSIS — J44.1 COPD EXACERBATION (HCC): ICD-10-CM

## 2024-04-26 DIAGNOSIS — Z20.822 LAB TEST NEGATIVE FOR COVID-19 VIRUS: ICD-10-CM

## 2024-04-26 DIAGNOSIS — R05.1 ACUTE COUGH: ICD-10-CM

## 2024-04-26 LAB
POCT INFLUENZA A: NEGATIVE
POCT INFLUENZA B: NEGATIVE
SARS-COV-2 RNA RESP QL NAA+PROBE: NOT DETECTED

## 2024-04-26 PROCEDURE — 71046 X-RAY EXAM CHEST 2 VIEWS: CPT | Performed by: NURSE PRACTITIONER

## 2024-04-26 PROCEDURE — U0002 COVID-19 LAB TEST NON-CDC: HCPCS | Performed by: NURSE PRACTITIONER

## 2024-04-26 PROCEDURE — 99215 OFFICE O/P EST HI 40 MIN: CPT | Performed by: NURSE PRACTITIONER

## 2024-04-26 PROCEDURE — 87502 INFLUENZA DNA AMP PROBE: CPT | Performed by: NURSE PRACTITIONER

## 2024-04-26 RX ORDER — AMOXICILLIN AND CLAVULANATE POTASSIUM 875; 125 MG/1; MG/1
1 TABLET, FILM COATED ORAL 2 TIMES DAILY
Qty: 14 TABLET | Refills: 0 | Status: SHIPPED | OUTPATIENT
Start: 2024-04-26 | End: 2024-05-03

## 2024-04-26 RX ORDER — PREDNISONE 20 MG/1
40 TABLET ORAL DAILY
Qty: 10 TABLET | Refills: 0 | Status: SHIPPED | OUTPATIENT
Start: 2024-04-26 | End: 2024-05-01

## 2024-04-26 RX ORDER — AZITHROMYCIN 250 MG/1
TABLET, FILM COATED ORAL
Qty: 6 TABLET | Refills: 0 | Status: SHIPPED | OUTPATIENT
Start: 2024-04-26 | End: 2024-05-01

## 2024-04-26 RX ORDER — ALBUTEROL SULFATE 90 UG/1
2 AEROSOL, METERED RESPIRATORY (INHALATION) EVERY 4 HOURS PRN
Qty: 1 EACH | Refills: 0 | Status: SHIPPED | OUTPATIENT
Start: 2024-04-26 | End: 2024-05-26

## 2024-04-26 NOTE — ED PROVIDER NOTES
Patient Seen in: [facility]      History     Chief Complaint   Patient presents with    Cough/URI     Stated Complaint: Fatigue, cough    Subjective:   Well-appearing 83-year-old male with essential hypertension, tobacco dependence, centrilobular emphysema and a history of a small bowel obstruction presents with complaints of a nonproductive cough and fatigue for the past 2 weeks.  Patient communicates that symptoms worsened over the past 2 days.  Patient communicates normal appetite, communicates that he eats bologna every day.  Patient denies fever or chills.  Patient denies chest pain or shortness of breath.  No over-the-counter medications have been taken for symptoms.  Patient denies dyspnea.              Objective:   Past Medical History:    Allergic rhinitis    Centrilobular emphysema (HCC)    Essential hypertension    History of nephrolithiasis    Tobacco dependence    1 PPD at 14yoa, and 3 PPD since 17yoa.               History reviewed. No pertinent surgical history.             Social History     Socioeconomic History    Marital status:    Tobacco Use    Smoking status: Every Day     Current packs/day: 3.00     Average packs/day: 3.0 packs/day for 70.3 years (211.0 ttl pk-yrs)     Types: Cigarettes     Start date: 1954    Smokeless tobacco: Never    Tobacco comments:     3 PPD smoker since 17yoa.   Vaping Use    Vaping status: Never Used   Substance and Sexual Activity    Alcohol use: Not Currently    Drug use: Never    Sexual activity: Not Currently     Social Determinants of Health     Financial Resource Strain: Low Risk  (6/9/2023)    Financial Resource Strain     Difficulty of Paying Living Expenses: Not very hard     Med Affordability: No   Transportation Needs: No Transportation Needs (6/9/2023)    Transportation Needs     Lack of Transportation: No              Review of Systems    Positive for stated complaint: Fatigue, cough  Other systems are as noted in  HPI.  Constitutional and vital signs reviewed.      All other systems reviewed and negative except as noted above.    Physical Exam     ED Triage Vitals [04/26/24 1402]   /63   Pulse 61   Resp 20   Temp 98.2 °F (36.8 °C)   Temp src Temporal   SpO2 95 %   O2 Device None (Room air)       Current:/63   Pulse 61   Temp 98.2 °F (36.8 °C) (Temporal)   Resp 20   SpO2 95%         Physical Exam  VS: Vital signs reviewed. 02 saturation within normal limits for this patient.    General: Patient is awake and alert, oriented to person, place and time. Pt appears non-toxic.     HEENT: Head is normocephalic, atraumatic. Nonicteric sclera, no conjunctival injection. No facial droop or slurred speech. No oral lesions or pallor. Mucous membranes moist.     Neck: No cervical lymphadenopathy. Supple. Normal ROM.    Heart: Regular rate and rhythm, normal S1, normal S2.    Lungs:       Effort: No tachypnea or respiratory distress.      Breath sounds: No decreased air movement. Examination of the left-middle field reveals wheezing. Examination of the right-lower field reveals wheezing. Examination of the left-lower field reveals wheezing. Wheezing present.  Otherwise, lungs clear to auscultation.     Abdomen: Soft, nontender, non-distended.    Extremities: No focal swelling or tenderness. Capillary refill noted.      Skin: Warm, dry and normal in color.     Psychiatric: Normal affect, judgement normal, insight normal.     CNS: Moves all 4 extremities. Interacts appropriately. No gait abnormality. Memory normal.        ED Course     Labs Reviewed   POCT FLU TEST - Normal    Narrative:     This assay is a rapid molecular in vitro test utilizing nucleic acid amplification of influenza A and B viral RNA.   RAPID SARS-COV-2 BY PCR - Normal   PROCEDURE: XR CHEST PA + LAT CHEST (CPT=71046)     COMPARISON: [facility], CT LD LUNG SCREENING (CPT=71271), 3/20/2023, 6:47 PM.     INDICATIONS: Cough x 2 days.      TECHNIQUE:   Two views.       FINDINGS:  CARDIAC/VASC: Normal.  No cardiac silhouette abnormality or cardiomegaly.  Unremarkable pulmonary vasculature.    MEDIAST/АНДРЕЙ: No visible mass or adenopathy.  LUNGS/PLEURA: Moderate hyperinflation compatible with moderately advanced COPD changes.  Moderately extensive interstitial airspace disease involving the mid and lower lung fields bilaterally suggesting acute interstitial pneumonia versus possible  interstitial edema from cardiac failure/fluid overload.  Correlate clinically.  No large pleural effusion.  BONES: Mild anterior wedging lower thoracic vertebral bodies with moderate superimposed spondylosis more likely chronic.  OTHER: Negative.        Impression  CONCLUSION:  1. Moderate hyperinflation compatible with moderately advanced COPD changes.  Moderately extensive interstitial airspace disease involving the mid and lower lung fields bilaterally suggesting either acute interstitial pneumonia versus possible  interstitial edema from cardiac failure/fluid overload.  Correlate clinically follow-up studies are advised.  No pleural effusion.     Dictated by (CST): Omer Jones MD on 4/26/2024 at 2:50 PM      Finalized by (CST): Omer Jones MD on 4/26/2024 at 2:52 PM    MDM   Medical Decision Making  Patient is well-appearing.  There was wheezing present to both left and right lower lobes.  O2 saturation is 95% on room air.  Chest x-ray shows moderate hyperinflation compatible with moderately advanced COPD changes.  Moderately extensive interstitial airspace disease involving the mid and lower lung fields bilaterally suggesting either acute interstitial pneumonia versus possible  interstitial edema from cardiac failure/fluid overload.  Correlate clinically follow-up studies are advised.  No pleural effusion.  I independently reviewed the chest x-ray.  Chest x-ray was discussed in entirely with patient.  I discussed my concerns regarding pneumonia versus fluid  overload/cardiac failure with patient.  I discussed with patient that my recommendation would be going to ED for a BNP, fluid overload/heart failure assessment.  Patient declined at this time.  I discussed with patient that the progression of symptoms can lead to disability or even death, patient continues to decline ER.  I empirically treated patient for a COPD exacerbation/pneumonia with a 7-day course of Augmentin and a Z-Wally. A 5-day course of prednisone and albuterol inhaler was also prescribed.  I discussed close PMD eval, including signs and symptoms for prompt ED eval.      Problems Addressed:  Acute cough: acute illness or injury  COPD exacerbation (HCC): acute illness or injury  Encounter for laboratory testing for COVID-19 virus: acute illness or injury  Lab test negative for COVID-19 virus: acute illness or injury  Pneumonia due to infectious organism, unspecified laterality, unspecified part of lung: acute illness or injury    Amount and/or Complexity of Data Reviewed  Labs: ordered. Decision-making details documented in ED Course.  Radiology: ordered and independent interpretation performed. Decision-making details documented in ED Course.    Risk  Prescription drug management.        Disposition and Plan     Clinical Impression:  1. Pneumonia due to infectious organism, unspecified laterality, unspecified part of lung    2. Encounter for laboratory testing for COVID-19 virus    3. Acute cough    4. Lab test negative for COVID-19 virus    5. COPD exacerbation (HCC)         Disposition:  Discharge  4/26/2024  4:01 pm    Follow-up:  Hadley Marcus DO  [address] [place] [id number]  [phone]    In 3 days            Medications Prescribed:  Discharge Medication List as of 4/26/2024  4:10 PM        START taking these medications    Details   amoxicillin clavulanate 875-125 MG Oral Tab Take 1 tablet by mouth 2 (two) times daily for 7 days., Normal, Disp-14 tablet, R-0      azithromycin  (ZITHROMAX Z-MICHELLE) 250 MG Oral Tab 500 mg once followed by 250 mg daily x 4 days, Normal, Disp-6 tablet, R-0      predniSONE 20 MG Oral Tab Take 2 tablets (40 mg total) by mouth daily for 5 days., Normal, Disp-10 tablet, R-0      !! albuterol 108 (90 Base) MCG/ACT Inhalation Aero Soln Inhale 2 puffs into the lungs every 4 (four) hours as needed for Wheezing., Normal, Disp-1 each, R-0       !! - Potential duplicate medications found. Please discuss with provider.

## 2024-04-26 NOTE — TELEPHONE ENCOUNTER
Action Requested: Summary for Provider     []  Critical Lab, Recommendations Needed  [] Need Additional Advice  []   FYI    []   Need Orders  [] Need Medications Sent to Pharmacy  []  Other     SUMMARY: Per protocol, patient should be seen in office today or tomorrow. No openings. Recommended .    Reason for call: Covid  Onset: Data Unavailable    Spoke to daughter,  (on KELY, verified patient's name and ). Call disconnected so RN called back. She said that patient has a bad cough and feels fatigued. He feels like how he did when he had Covid previously. No fever.    RN relayed that Dr. Marcus leaves soon and no openings. Recommended urgent care for evaluation.     Patient was agreeable and will go to .       Reason for Disposition   Patient wants to be seen    Protocols used: Cough-A-OH

## 2024-05-20 ENCOUNTER — HOSPITAL ENCOUNTER (OUTPATIENT)
Dept: CT IMAGING | Facility: HOSPITAL | Age: 84
Discharge: HOME | End: 2024-05-20
Attending: FAMILY MEDICINE

## 2024-05-20 DIAGNOSIS — F17.200 TOBACCO DEPENDENCE: ICD-10-CM

## 2024-05-20 DIAGNOSIS — R91.8 PULMONARY NODULES: ICD-10-CM

## 2024-05-20 PROCEDURE — 71271 CT THORAX LUNG CANCER SCR C-: CPT | Performed by: FAMILY MEDICINE

## 2024-06-30 NOTE — TELEPHONE ENCOUNTER
Please review. Protocol Failed; No Protocol    Message sent to patient to complete labs     Requested Prescriptions   Pending Prescriptions Disp Refills    AMLODIPINE 10 MG Oral Tab [Pharmacy Med Name: AMLODIPINE BESYLATE 10MG TABLETS] 90 tablet 3     Sig: TAKE 1 TABLET(10 MG) BY MOUTH DAILY       Hypertension Medications Protocol Failed - 6/26/2024  8:58 AM        Failed - CMP or BMP in past 12 months        Failed - EGFRCR or GFRNAA > 50     GFR Evaluation            Passed - Last BP reading less than 140/90     BP Readings from Last 1 Encounters:   04/26/24 129/63               Passed - In person appointment or virtual visit in the past 12 mos or appointment in next 3 mos     Recent Outpatient Visits              2 months ago Encounter for annual health examination    [facility]     [facility]    1 year ago COPD exacerbation (HCC)    [facility]     [facility]    1 year ago Encounter for annual health examination    [facility]    Office Visit    1 year ago COPD exacerbation (HCC)    [facility]     Office Visit    2 years ago Encounter for annual health examination    [facility] Hadley Marcus, DO    Office Visit                               Recent Outpatient Visits              2 months ago Encounter for annual health examination    [facility]    [facility]    1 year ago COPD exacerbation (HCC)    [facility]     [facility] Visit    1 year ago Encounter for annual health examination    [facility]    Office Visit    1 year ago COPD exacerbation  (HCC)    [facility], [address], [place], Hadley,     Office Visit    2 years ago Encounter for annual health examination    [facility], [address], [place], Hadley,     Office Visit

## 2024-07-01 RX ORDER — AMLODIPINE BESYLATE 10 MG/1
10 TABLET ORAL DAILY
Qty: 90 TABLET | Refills: 3 | Status: SHIPPED | OUTPATIENT
Start: 2024-07-01

## 2024-12-23 ENCOUNTER — APPOINTMENT (OUTPATIENT)
Dept: GENERAL RADIOLOGY | Age: 84
End: 2024-12-23
Attending: NURSE PRACTITIONER
Payer: MEDICARE

## 2024-12-23 ENCOUNTER — NURSE TRIAGE (OUTPATIENT)
Facility: CLINIC | Age: 84
End: 2024-12-23

## 2024-12-23 ENCOUNTER — HOSPITAL ENCOUNTER (OUTPATIENT)
Age: 84
Discharge: HOME | End: 2024-12-23
Payer: MEDICARE

## 2024-12-23 VITALS
SYSTOLIC BLOOD PRESSURE: 151 MMHG | DIASTOLIC BLOOD PRESSURE: 72 MMHG | HEART RATE: 75 BPM | TEMPERATURE: 99 F | RESPIRATION RATE: 20 BRPM | OXYGEN SATURATION: 99 %

## 2024-12-23 DIAGNOSIS — R05.1 ACUTE COUGH: ICD-10-CM

## 2024-12-23 DIAGNOSIS — J11.1 INFLUENZA: Primary | ICD-10-CM

## 2024-12-23 LAB
POCT INFLUENZA A: POSITIVE
POCT INFLUENZA B: NEGATIVE
SARS-COV-2 RNA RESP QL NAA+PROBE: NOT DETECTED

## 2024-12-23 PROCEDURE — 87502 INFLUENZA DNA AMP PROBE: CPT | Performed by: NURSE PRACTITIONER

## 2024-12-23 PROCEDURE — 99214 OFFICE O/P EST MOD 30 MIN: CPT | Performed by: NURSE PRACTITIONER

## 2024-12-23 PROCEDURE — U0002 COVID-19 LAB TEST NON-CDC: HCPCS | Performed by: NURSE PRACTITIONER

## 2024-12-23 PROCEDURE — 71046 X-RAY EXAM CHEST 2 VIEWS: CPT | Performed by: NURSE PRACTITIONER

## 2024-12-23 RX ORDER — OSELTAMIVIR PHOSPHATE 75 MG/1
75 CAPSULE ORAL 2 TIMES DAILY
Qty: 10 CAPSULE | Refills: 0 | Status: SHIPPED | OUTPATIENT
Start: 2024-12-23 | End: 2024-12-28

## 2024-12-23 NOTE — ED PROVIDER NOTES
Patient Seen in: [facility]      History     Chief Complaint   Patient presents with    Cough/URI     Stated Complaint: Cough;Runny nose;Severe headache;Fever    Subjective:   HPI  Patient is an 84-year-old with pretension and COPD.  He presents to immediate care center today with a concern for fever, cough, and congestion that started last night.  His daughter recently had influenza.  He does endorse some mild shortness of breath with exertion, but no difficulty with breathing at rest.  He has been treated several years ago for pneumonia and feels this is considerably different.  He has been able to eat and drink without difficulty, no vomiting or diarrhea.          Objective:     Past Medical History:    Allergic rhinitis    Centrilobular emphysema (HCC)    Essential hypertension    History of nephrolithiasis    Tobacco dependence    1 PPD at 14yoa, and 3 PPD since 17yoa.               History reviewed. No pertinent surgical history.             Social History     Socioeconomic History    Marital status:    Tobacco Use    Smoking status: Every Day     Current packs/day: 3.00     Average packs/day: 3.0 packs/day for 71.0 years (212.9 ttl pk-yrs)     Types: Cigarettes     Start date: 1954    Smokeless tobacco: Never    Tobacco comments:     3 PPD smoker since 17yoa.   Vaping Use    Vaping status: Never Used   Substance and Sexual Activity    Alcohol use: Not Currently    Drug use: Never    Sexual activity: Not Currently     Social Drivers of Health     Financial Resource Strain: Low Risk  (6/9/2023)    Financial Resource Strain     Difficulty of Paying Living Expenses: Not very hard     Med Affordability: No   Transportation Needs: No Transportation Needs (6/9/2023)    Transportation Needs     Lack of Transportation: No              Review of Systems   Constitutional:  Positive for chills, fatigue and fever.   HENT:  Positive for congestion and rhinorrhea.    Respiratory:  Positive for cough.     Cardiovascular:  Negative for chest pain.   Gastrointestinal:  Negative for diarrhea, nausea and vomiting.   Skin:  Negative for rash.   Neurological:  Positive for headaches.       Positive for stated complaint: Cough;Runny nose;Severe headache;Fever  Other systems are as noted in HPI.  Constitutional and vital signs reviewed.      All other systems reviewed and negative except as noted above.    Physical Exam     ED Triage Vitals [12/23/24 1350]   /72   Pulse 75   Resp 20   Temp 99.2 °F (37.3 °C)   Temp src Oral   SpO2 93 %   O2 Device None (Room air)       Current Vitals:   Vital Signs  BP: 151/72  Pulse: 75  Resp: 20  Temp: 99.2 °F (37.3 °C)  Temp src: Oral    Oxygen Therapy  SpO2: 99 %  O2 Device: None (Room air)        Physical Exam  Vitals and nursing note reviewed.   Constitutional:       General: He is not in acute distress.     Appearance: He is ill-appearing. He is not toxic-appearing.   Eyes:      Conjunctiva/sclera: Conjunctivae normal.   Cardiovascular:      Rate and Rhythm: Regular rhythm.   Pulmonary:      Effort: Pulmonary effort is normal. No respiratory distress.      Breath sounds: Normal breath sounds.   Skin:     General: Skin is warm and dry.      Findings: No rash.   Neurological:      Mental Status: He is alert and oriented to person, place, and time.   Psychiatric:         Behavior: Behavior normal.             ED Course     Labs Reviewed   POCT FLU TEST - Abnormal; Notable for the following components:       Result Value    POCT INFLUENZA A Positive (*)     All other components within normal limits    Narrative:     This assay is a rapid molecular in vitro test utilizing nucleic acid amplification of influenza A and B viral RNA.   RAPID SARS-COV-2 BY PCR - Normal     XR CHEST PA + LAT CHEST (CPT=71046)   Final Result   PROCEDURE: XR CHEST PA + LAT CHEST (CPT=71046)       COMPARISON: [facility], CT LD LUNG SCREENING    (CPT=71271), 5/20/2024, 4:43 PM.   [facility] in [place],    XR CHEST PA + LAT CHEST (CPT=71046), 4/26/2024, 2:27 PM.       INDICATIONS: Central lobular emphysema.  Cough x 2 days.       TECHNIQUE:   Two views.         FINDINGS:    CARDIAC/VASC:  Normal cardiac silhouette.   MEDIAST/АНДРЕЙ: Atherosclerotic aorta with no visible aneurysm.     LUNGS/PLEURA: Hyperinflation in the upper lobes with chronic crowding of    lung markings bases consistent with bibasilar atelectasis/scarring.  No    acute airspace consolidation.   BONES: Mild scoliosis with mild degenerative disc disease and spondylosis.        OTHER: Chronic wedging midthoracic vertebra and accentuated thoracic    kyphosis                   =====   CONCLUSION:    1. Emphysematous changes.  Chronic bibasilar scarring.   2. No acute airspace consolidation.               Dictated by (CST): Jamal Craig MD on 12/23/2024 at 2:54 PM        Finalized by (CST): Jamal Craig MD on 12/23/2024 at 2:58 PM                 Patient had room air pulse oximetry of 93% at time of triage.  Ambulatory pulse ox measurements were measured when patient walked from his room to radiology.  Walking to radiology, he maintained oxygen saturations of 93%.  Walking back to his room from radiology, he registered oximetry readings of 88%.  He states during this time he felt winded.  After allowing a couple minutes of rest, patient's pulse oximetry readings were back in the mid to upper 90s.              MDM      Laboratory and radiographic findings reviewed with patient at bedside prior to disposition.    Patient was informed that it is very concerning that he comes hypoxic with ambulation.  We did discuss that sometimes patients need to be hospitalized with influenza, and because he demonstrated this period of hypoxia, we recommend he go directly from here to the emergency department for further evaluation and consider hospitalization while he is being treated for the flu.  He states  understanding of these instructions.  However, states he would feel more comfortable going home.  He acknowledges that he does have COPD, and does not feel his breathing at this time is bad.  He lives at home with his wife (retired nurse) whom he feels can monitor him closely at home.  He does acknowledge that if at any time at home he has worsening shortness of breath, headache or dizziness, weakness, he will present directly to the emergency department for further evaluation.            Medical Decision Making   Differential diagnoses considered included, but are not exclusive of: Respiratory failure, bacterial vs viral sinusitis, dehydration, pneumonia, influenza, Covid-19 infection, and other viral upper respiratory infection.       Problems Addressed:  Influenza: undiagnosed new problem with uncertain prognosis    Amount and/or Complexity of Data Reviewed  Labs:  Decision-making details documented in ED Course.  Radiology:  Decision-making details documented in ED Course.    Risk  OTC drugs.  Prescription drug management.        Disposition and Plan     Clinical Impression:  1. Influenza    2. Acute cough         Disposition:  Discharge  12/23/2024  3:22 pm    Follow-up:  Hadley Marcus DO  [address] [place] [id number]  [phone]    Schedule an appointment as soon as possible for a visit in 1 week      [facility] Emergency Department  [address] [facility] [address]  [phone]  Go to   At anytime you become short of breath, gets winded, have any lightheaded or dizziness, you must go immediately to the emergency department for further evaluation and treatment.          Medications Prescribed:  Discharge Medication List as of 12/23/2024  3:31 PM        START taking these medications    Details   oseltamivir (TAMIFLU) 75 MG Oral Cap Take 1 capsule (75 mg total) by mouth 2 (two) times daily for 5 days., Normal, Disp-10 capsule, R-0                 Supplementary Documentation:

## 2024-12-23 NOTE — ED INITIAL ASSESSMENT (HPI)
Pt states last night began having symptoms, pt states having a cough and congestion. Pt denies fevers.   
Yes

## 2024-12-23 NOTE — TELEPHONE ENCOUNTER
Please reply to pool: EM RN TRIAGE  Action Requested: Summary for Provider     []  Critical Lab, Recommendations Needed  [] Need Additional Advice  [x]   FYI    []   Need Orders  [] Need Medications Sent to Pharmacy  []  Other     SUMMARY: Patient contacts clinic reporting body aches, headache, cough and congestion x 2 days.  Home covid test negative.  Denies fever or breathing difficulty.  Requesting appointment, no acute visits to offer, advised immediate care evaluation.  He agrees and will go.     Reason for call: Cough/URI  Onset: Data Unavailable                       Reason for Disposition   Patient wants to be seen    Protocols used: Common Cold-A-OH

## 2025-01-05 ENCOUNTER — APPOINTMENT (OUTPATIENT)
Dept: INTERVENTIONAL RADIOLOGY/VASCULAR | Facility: HOSPITAL | Age: 85
End: 2025-01-05
Attending: INTERNAL MEDICINE
Payer: MEDICARE

## 2025-01-05 ENCOUNTER — APPOINTMENT (OUTPATIENT)
Dept: GENERAL RADIOLOGY | Facility: HOSPITAL | Age: 85
End: 2025-01-05
Attending: EMERGENCY MEDICINE
Payer: MEDICARE

## 2025-01-05 ENCOUNTER — HOSPITAL ENCOUNTER (INPATIENT)
Facility: HOSPITAL | Age: 85
LOS: 3 days | Discharge: HOME | End: 2025-01-08
Attending: EMERGENCY MEDICINE | Admitting: INTERNAL MEDICINE
Payer: MEDICARE

## 2025-01-05 DIAGNOSIS — I21.19 ST ELEVATION MYOCARDIAL INFARCTION (STEMI) INVOLVING OTHER CORONARY ARTERY OF INFERIOR WALL (HCC): Primary | ICD-10-CM

## 2025-01-05 LAB
ANION GAP SERPL CALC-SCNC: 11 MMOL/L (ref 0–18)
APTT PPP: 27.3 SECONDS (ref 23–36)
BASOPHILS # BLD AUTO: 0.05 X10(3) UL (ref 0–0.2)
BASOPHILS NFR BLD AUTO: 0.4 %
BUN BLD-MCNC: 13 MG/DL (ref 9–23)
BUN/CREAT SERPL: 11.6 (ref 10–20)
CALCIUM BLD-MCNC: 9.8 MG/DL (ref 8.7–10.4)
CHLORIDE SERPL-SCNC: 107 MMOL/L (ref 98–112)
CHOLEST SERPL-MCNC: 160 MG/DL (ref ?–200)
CO2 SERPL-SCNC: 24 MMOL/L (ref 21–32)
CREAT BLD-MCNC: 1.12 MG/DL
DEPRECATED RDW RBC AUTO: 43.5 FL (ref 35.1–46.3)
EGFRCR SERPLBLD CKD-EPI 2021: 66 ML/MIN/1.73M2 (ref 60–?)
EOSINOPHIL # BLD AUTO: 0.3 X10(3) UL (ref 0–0.7)
EOSINOPHIL NFR BLD AUTO: 2.7 %
ERYTHROCYTE [DISTWIDTH] IN BLOOD BY AUTOMATED COUNT: 12.8 % (ref 11–15)
GLUCOSE BLD-MCNC: 175 MG/DL (ref 70–99)
HCT VFR BLD AUTO: 47 %
HDLC SERPL-MCNC: 39 MG/DL (ref 40–59)
HGB BLD-MCNC: 16.2 G/DL
IMM GRANULOCYTES # BLD AUTO: 0.03 X10(3) UL (ref 0–1)
IMM GRANULOCYTES NFR BLD: 0.3 %
INR BLD: 0.96 (ref 0.8–1.2)
ISTAT ACTIVATED CLOTTING TIME: 216 SECONDS (ref 125–137)
ISTAT ACTIVATED CLOTTING TIME: 273 SECONDS (ref 125–137)
LDLC SERPL CALC-MCNC: 89 MG/DL (ref ?–100)
LYMPHOCYTES # BLD AUTO: 4.59 X10(3) UL (ref 1–4)
LYMPHOCYTES NFR BLD AUTO: 40.9 %
MCH RBC QN AUTO: 31.5 PG (ref 26–34)
MCHC RBC AUTO-ENTMCNC: 34.5 G/DL (ref 31–37)
MCV RBC AUTO: 91.4 FL
MONOCYTES # BLD AUTO: 0.82 X10(3) UL (ref 0.1–1)
MONOCYTES NFR BLD AUTO: 7.3 %
NEUTROPHILS # BLD AUTO: 5.43 X10 (3) UL (ref 1.5–7.7)
NEUTROPHILS # BLD AUTO: 5.43 X10(3) UL (ref 1.5–7.7)
NEUTROPHILS NFR BLD AUTO: 48.4 %
NONHDLC SERPL-MCNC: 121 MG/DL (ref ?–130)
OSMOLALITY SERPL CALC.SUM OF ELEC: 298 MOSM/KG (ref 275–295)
PLATELET # BLD AUTO: 218 10(3)UL (ref 150–450)
POTASSIUM SERPL-SCNC: 3.3 MMOL/L (ref 3.5–5.1)
PROTHROMBIN TIME: 13.4 SECONDS (ref 11.6–14.8)
RBC # BLD AUTO: 5.14 X10(6)UL
SODIUM SERPL-SCNC: 142 MMOL/L (ref 136–145)
TRIGL SERPL-MCNC: 184 MG/DL (ref 30–149)
TROPONIN I SERPL HS-MCNC: 1648 NG/L
VLDLC SERPL CALC-MCNC: 30 MG/DL (ref 0–30)
WBC # BLD AUTO: 11.2 X10(3) UL (ref 4–11)

## 2025-01-05 PROCEDURE — 71045 X-RAY EXAM CHEST 1 VIEW: CPT | Performed by: EMERGENCY MEDICINE

## 2025-01-05 PROCEDURE — B2151ZZ FLUOROSCOPY OF LEFT HEART USING LOW OSMOLAR CONTRAST: ICD-10-PCS | Performed by: INTERNAL MEDICINE

## 2025-01-05 PROCEDURE — 5A2204Z RESTORATION OF CARDIAC RHYTHM, SINGLE: ICD-10-PCS | Performed by: INTERNAL MEDICINE

## 2025-01-05 PROCEDURE — B2111ZZ FLUOROSCOPY OF MULTIPLE CORONARY ARTERIES USING LOW OSMOLAR CONTRAST: ICD-10-PCS | Performed by: INTERNAL MEDICINE

## 2025-01-05 PROCEDURE — 4A023N7 MEASUREMENT OF CARDIAC SAMPLING AND PRESSURE, LEFT HEART, PERCUTANEOUS APPROACH: ICD-10-PCS | Performed by: INTERNAL MEDICINE

## 2025-01-05 PROCEDURE — B41G1ZZ FLUOROSCOPY OF LEFT LOWER EXTREMITY ARTERIES USING LOW OSMOLAR CONTRAST: ICD-10-PCS | Performed by: INTERNAL MEDICINE

## 2025-01-05 PROCEDURE — B240ZZ3 ULTRASONOGRAPHY OF SINGLE CORONARY ARTERY, INTRAVASCULAR: ICD-10-PCS | Performed by: INTERNAL MEDICINE

## 2025-01-05 PROCEDURE — 027036Z DILATION OF CORONARY ARTERY, ONE ARTERY WITH THREE DRUG-ELUTING INTRALUMINAL DEVICES, PERCUTANEOUS APPROACH: ICD-10-PCS | Performed by: INTERNAL MEDICINE

## 2025-01-05 RX ORDER — MIDAZOLAM HYDROCHLORIDE 1 MG/ML
INJECTION INTRAMUSCULAR; INTRAVENOUS
Status: DISCONTINUED
Start: 2025-01-05 | End: 2025-01-05 | Stop reason: WASHOUT

## 2025-01-05 RX ORDER — SODIUM CHLORIDE 9 MG/ML
INJECTION, SOLUTION INTRAVENOUS CONTINUOUS
Status: ACTIVE | OUTPATIENT
Start: 2025-01-05 | End: 2025-01-06

## 2025-01-05 RX ORDER — MORPHINE SULFATE 2 MG/ML
2 INJECTION, SOLUTION INTRAMUSCULAR; INTRAVENOUS ONCE
Status: COMPLETED | OUTPATIENT
Start: 2025-01-05 | End: 2025-01-05

## 2025-01-05 RX ORDER — AMIODARONE HYDROCHLORIDE 150 MG/3ML
INJECTION, SOLUTION INTRAVENOUS
Status: COMPLETED
Start: 2025-01-05 | End: 2025-01-05

## 2025-01-05 RX ORDER — HEPARIN SODIUM AND DEXTROSE 10000; 5 [USP'U]/100ML; G/100ML
INJECTION INTRAVENOUS
Status: COMPLETED
Start: 2025-01-05 | End: 2025-01-06

## 2025-01-05 RX ORDER — ROSUVASTATIN CALCIUM 20 MG/1
40 TABLET, COATED ORAL NIGHTLY
Status: DISCONTINUED | OUTPATIENT
Start: 2025-01-05 | End: 2025-01-08

## 2025-01-05 RX ORDER — HEPARIN SODIUM 1000 [USP'U]/ML
INJECTION, SOLUTION INTRAVENOUS; SUBCUTANEOUS
Status: COMPLETED
Start: 2025-01-05 | End: 2025-01-05

## 2025-01-05 RX ORDER — ASPIRIN 81 MG/1
TABLET, CHEWABLE ORAL
Status: COMPLETED | OUTPATIENT
Start: 2025-01-05 | End: 2025-01-05

## 2025-01-05 RX ORDER — HEPARIN SODIUM 1000 [USP'U]/ML
60 INJECTION, SOLUTION INTRAVENOUS; SUBCUTANEOUS ONCE
Status: COMPLETED | OUTPATIENT
Start: 2025-01-05 | End: 2025-01-05

## 2025-01-05 RX ORDER — AMIODARONE HYDROCHLORIDE 150 MG/3ML
INJECTION, SOLUTION INTRAVENOUS
Status: DISCONTINUED
Start: 2025-01-05 | End: 2025-01-05 | Stop reason: WASHOUT

## 2025-01-05 RX ORDER — HEPARIN SODIUM AND DEXTROSE 10000; 5 [USP'U]/100ML; G/100ML
INJECTION INTRAVENOUS CONTINUOUS
Status: DISCONTINUED | OUTPATIENT
Start: 2025-01-06 | End: 2025-01-05

## 2025-01-05 RX ORDER — ASPIRIN 300 MG/1
150 SUPPOSITORY RECTAL ONCE
Status: DISCONTINUED | OUTPATIENT
Start: 2025-01-05 | End: 2025-01-08

## 2025-01-05 RX ORDER — MORPHINE SULFATE 2 MG/ML
INJECTION, SOLUTION INTRAMUSCULAR; INTRAVENOUS
Status: COMPLETED
Start: 2025-01-05 | End: 2025-01-05

## 2025-01-05 RX ORDER — HEPARIN SODIUM AND DEXTROSE 10000; 5 [USP'U]/100ML; G/100ML
12 INJECTION INTRAVENOUS ONCE
Status: DISCONTINUED | OUTPATIENT
Start: 2025-01-05 | End: 2025-01-05

## 2025-01-05 RX ORDER — ASPIRIN 300 MG/1
300 SUPPOSITORY RECTAL ONCE
Status: DISCONTINUED | OUTPATIENT
Start: 2025-01-05 | End: 2025-01-05

## 2025-01-05 RX ORDER — NITROGLYCERIN 20 MG/100ML
INJECTION INTRAVENOUS
Status: COMPLETED
Start: 2025-01-05 | End: 2025-01-05

## 2025-01-05 RX ORDER — ASPIRIN 81 MG/1
81 TABLET ORAL DAILY
Status: DISCONTINUED | OUTPATIENT
Start: 2025-01-06 | End: 2025-01-08

## 2025-01-05 RX ORDER — IOPAMIDOL 612 MG/ML
170 INJECTION, SOLUTION INTRAVASCULAR
Status: COMPLETED | OUTPATIENT
Start: 2025-01-05 | End: 2025-01-05

## 2025-01-05 RX ORDER — LIDOCAINE HYDROCHLORIDE 20 MG/ML
INJECTION, SOLUTION EPIDURAL; INFILTRATION; INTRACAUDAL; PERINEURAL
Status: COMPLETED
Start: 2025-01-05 | End: 2025-01-05

## 2025-01-06 ENCOUNTER — APPOINTMENT (OUTPATIENT)
Dept: CV DIAGNOSTICS | Facility: HOSPITAL | Age: 85
End: 2025-01-06
Attending: INTERNAL MEDICINE
Payer: MEDICARE

## 2025-01-06 LAB
ANION GAP SERPL CALC-SCNC: 6 MMOL/L (ref 0–18)
ANION GAP SERPL CALC-SCNC: 9 MMOL/L (ref 0–18)
APTT PPP: 30.9 SECONDS (ref 23–36)
ATRIAL RATE: 39 BPM
ATRIAL RATE: 94 BPM
BUN BLD-MCNC: 11 MG/DL (ref 9–23)
BUN BLD-MCNC: 13 MG/DL (ref 9–23)
BUN/CREAT SERPL: 11.5 (ref 10–20)
BUN/CREAT SERPL: 12.5 (ref 10–20)
CALCIUM BLD-MCNC: 9.3 MG/DL (ref 8.7–10.4)
CALCIUM BLD-MCNC: 9.5 MG/DL (ref 8.7–10.4)
CHLORIDE SERPL-SCNC: 108 MMOL/L (ref 98–112)
CHLORIDE SERPL-SCNC: 111 MMOL/L (ref 98–112)
CO2 SERPL-SCNC: 20 MMOL/L (ref 21–32)
CO2 SERPL-SCNC: 24 MMOL/L (ref 21–32)
CREAT BLD-MCNC: 0.96 MG/DL
CREAT BLD-MCNC: 1.04 MG/DL
DEPRECATED RDW RBC AUTO: 43.1 FL (ref 35.1–46.3)
DEPRECATED RDW RBC AUTO: 44 FL (ref 35.1–46.3)
EGFRCR SERPLBLD CKD-EPI 2021: 72 ML/MIN/1.73M2 (ref 60–?)
EGFRCR SERPLBLD CKD-EPI 2021: 79 ML/MIN/1.73M2 (ref 60–?)
ERYTHROCYTE [DISTWIDTH] IN BLOOD BY AUTOMATED COUNT: 12.9 % (ref 11–15)
ERYTHROCYTE [DISTWIDTH] IN BLOOD BY AUTOMATED COUNT: 13.1 % (ref 11–15)
EST. AVERAGE GLUCOSE BLD GHB EST-MCNC: 111 MG/DL (ref 68–126)
GLUCOSE BLD-MCNC: 109 MG/DL (ref 70–99)
GLUCOSE BLD-MCNC: 128 MG/DL (ref 70–99)
HBA1C MFR BLD: 5.5 % (ref ?–5.7)
HCT VFR BLD AUTO: 40.7 %
HCT VFR BLD AUTO: 42.6 %
HGB BLD-MCNC: 14.2 G/DL
HGB BLD-MCNC: 15 G/DL
MCH RBC QN AUTO: 31.4 PG (ref 26–34)
MCH RBC QN AUTO: 32.6 PG (ref 26–34)
MCHC RBC AUTO-ENTMCNC: 34.9 G/DL (ref 31–37)
MCHC RBC AUTO-ENTMCNC: 35.2 G/DL (ref 31–37)
MCV RBC AUTO: 90 FL
MCV RBC AUTO: 92.6 FL
OSMOLALITY SERPL CALC.SUM OF ELEC: 285 MOSM/KG (ref 275–295)
OSMOLALITY SERPL CALC.SUM OF ELEC: 293 MOSM/KG (ref 275–295)
P AXIS: 75 DEGREES
P AXIS: 82 DEGREES
P-R INTERVAL: 188 MS
PLATELET # BLD AUTO: 165 10(3)UL (ref 150–450)
PLATELET # BLD AUTO: 181 10(3)UL (ref 150–450)
POTASSIUM SERPL-SCNC: 3.7 MMOL/L (ref 3.5–5.1)
POTASSIUM SERPL-SCNC: 4.2 MMOL/L (ref 3.5–5.1)
Q-T INTERVAL: 356 MS
Q-T INTERVAL: 482 MS
QRS DURATION: 78 MS
QRS DURATION: 86 MS
QTC CALCULATION (BEZET): 351 MS
QTC CALCULATION (BEZET): 445 MS
R AXIS: 48 DEGREES
R AXIS: 87 DEGREES
RBC # BLD AUTO: 4.52 X10(6)UL
RBC # BLD AUTO: 4.6 X10(6)UL
SODIUM SERPL-SCNC: 137 MMOL/L (ref 136–145)
SODIUM SERPL-SCNC: 141 MMOL/L (ref 136–145)
T AXIS: 86 DEGREES
T AXIS: 96 DEGREES
TROPONIN I SERPL HS-MCNC: ABNORMAL NG/L
VENTRICULAR RATE: 32 BPM
VENTRICULAR RATE: 94 BPM
WBC # BLD AUTO: 10.9 X10(3) UL (ref 4–11)
WBC # BLD AUTO: 14.6 X10(3) UL (ref 4–11)

## 2025-01-06 PROCEDURE — 93306 TTE W/DOPPLER COMPLETE: CPT | Performed by: INTERNAL MEDICINE

## 2025-01-06 PROCEDURE — 99222 1ST HOSP IP/OBS MODERATE 55: CPT | Performed by: INTERNAL MEDICINE

## 2025-01-06 RX ORDER — POTASSIUM CHLORIDE 1500 MG/1
40 TABLET, EXTENDED RELEASE ORAL ONCE
Status: COMPLETED | OUTPATIENT
Start: 2025-01-06 | End: 2025-01-06

## 2025-01-06 RX ORDER — POTASSIUM CHLORIDE 1500 MG/1
40 TABLET, EXTENDED RELEASE ORAL EVERY 4 HOURS
Status: DISCONTINUED | OUTPATIENT
Start: 2025-01-06 | End: 2025-01-06 | Stop reason: ALTCHOICE

## 2025-01-06 RX ORDER — ONDANSETRON 2 MG/ML
4 INJECTION INTRAMUSCULAR; INTRAVENOUS EVERY 6 HOURS PRN
Status: DISCONTINUED | OUTPATIENT
Start: 2025-01-06 | End: 2025-01-08

## 2025-01-06 RX ORDER — LIDOCAINE HYDROCHLORIDE 20 MG/ML
10 JELLY TOPICAL ONCE
Status: COMPLETED | OUTPATIENT
Start: 2025-01-06 | End: 2025-01-06

## 2025-01-06 NOTE — PROCEDURES
[facility]  part of [facility] Cardiac Cath Procedure Note  Robert Dyer Patient Status:  Emergency    1940 MRN [id number]   Location [facility] EMERGENCY DEPARTMENT Attending Sudeep Finch DO   Hosp Day # 0 PCP Hadley Marcus DO       Proceduralist: Latrice Barr DO  Date of Procedure: 2025     Preoperative Diagnosis:  1) Chest pain, inferior STEMI  2) Complete heart block    Postoperative Diagnosis:  1) Inferior STEMI due to 100% acute thrombotic occlusion of mid RCA  2) Ventricular fibrillation X3, DCCV X3  3) Severe PAD, occluded right common iliac artery    Procedures Performed:  1) Selective Left and Right Coronary angiogram  2) Left Heart Catheterization  3) Successful IVUS guided PCI of proximal and mid RCA X3 with 4 x 18, 4 x 22, 3.5 x 12 mm resolute [place] NIGHAT  4) DCCV X3  5) TVP placement, TVP removal    Indication/History: Robert Dyer is a 84 year old male with history of tobacco use, hypertension, emphysema presenting with worsening chest pain throughout the day.  Noted to have complete heart block and inferior STEMI on EKG, Cath Lab activated for  emergent coronary catheterization, PCI.    Summary of Case: After written informed consent was obtained from the patient, patient was brought to the cardiac catheterization laboratory.  Patient was prepped and draped in the usual sterile fashion. Lidocaine 1% was used to infiltrate the right and left groins for local anesthesia and a 6 [place]er introducer sheath was inserted into the right femoral vein and left femoral artery using modified Seldinger technique.      Selective coronary angiography performed with 6 [place] Island[place] JR4 catheter for RCA and 6 [place] JL 4 catheter for LCA.  Angiography performed in standard projections.  6 [place] Islander pigtail catheter used to cross [employer] perform left heart catheterization, left ventriculogram.    Selective left femoral angiogram done assess anatomy for  closure.    Findings:  1) Left Ventriculography at 30 degrees LEE: LVEF 55 to 60%, basal inferior wall hypokinesia  2) Hemodynamics: LVEDP 15 mmHg, no grained upon pullback across aortic valve  3) Coronaries:  Left main coronary artery: Large size vessel with no angiographically significant disease.  Left anterior descending artery: Large size, Type IV vessel with  luminal irregularities of the mid segment.  Ramus intermedius: Hazy 70% stenosis of mid segment  Circumflex artery: Medium size non-dominant vessel with  no angiographically significant disease.  Right coronary artery: Large size dominant vessel with 70% proximal stenosis, 100% thrombotic occlusion of mid segment.     Peripheral vasculature:  Right common iliac artery: Short segment occlusion of the proximal segment  Left common iliac artery: Moderate calcified disease of proximal segment.    Specimen sent to: No specimen collected  Estimated blood loss: 10cc  Closure: Perclose left femoral artery, Mynx closure of right femoral vein      Lesion #1 proximal, mid RCA  Lesion Characteristics- non torturous, noncalcified.  Pre-intervention stenosis 100%, Post intervention stenosis 0%.  Pre BLUE 0, Post BLUE 3.     Guide Catheter: JR4 6 Telugu guide  Wire: [place] blue  Pre-dilation Balloon: 2.5 x 15 mm balloon  Stent: 4 x 18, 4 x 22, four 3.5 x 12 mm resolute [place] NIGHAT in overlapping fashion from proximal to distal  Post-dilation Balloon: 3.5 mm NC in mid segment    IV was maintained by RN and fentanyl 50 mcg was given.  Patient was assessed and monitoring of oxygen, heart rate and blood pressure by nurse and myself during the exam from 2111 to 2203.      Assessment and Plan:  84-year-old male with above history presenting with inferior STEMI and complete heart block.  To  stabilize rhythm, TVP was introduced however patient developed ventricular fibrillation 3 times during this portion of the procedure.  Required electrical cardioversion x 3, did not require  CPR.  He did also receive atropine for bradycardia, 1 dose of epinephrine 1 mg for hypotension.  CODE BLUE was called however patient did not require CPR and decided to hold off on intubation as patient was mentating fine and rhythm stabilized.    Right femoral arterial site was abandoned due to  of right common iliac artery.  Left femoral artery  access was obtained and coronary angiography as well as PCI were performed as detailed above.  At the end of the procedure, patient was chest pain free, he was back in sinus rhythm with one-to-one conduction and no recurrent ventricular fibrillation.  Will recommend low-dose amiodarone infusion overnight.    Continue aspirin, Brilinta, statin.  Check echocardiogram in the morning.  Hold off on beta-blocker at this time.    Latrice Barr DO  [facility]   Interventional Cardiac and Vascular Services        Latrice Barr DO  01/05/25

## 2025-01-06 NOTE — SPIRITUAL CARE NOTE
Spiritual Care Visit Note    Patient Name: Robert Dyer Date of Spiritual Care Visit: 25   : 1940 Primary Dx: ST elevation myocardial infarction (STEMI) involving other coronary artery of inferior wall (HCC)       Referred By: Referral From: Care Coordination;Nurse    Spiritual Care Taxonomy:    Intended Effects: Build relationship of care and support    Methods: Collaborate with care team member;Offer support    Interventions: Acknowledge current situation;Active listening;Ask guided questions;Silent prayer;Respond as  to a defined crisis event    Visit Type/Summary:     - Spiritual Care: Patient receiving treatment at time of visit.  provided Silent prayer and will follow up with patient family for support as needed.    Spiritual Care support can be requested via an Epic consult. For urgent/immediate needs, please contact the On Call  at: [place]: ext [phone]    Rev Alexus Mai MDiv

## 2025-01-06 NOTE — CARDIAC REHAB
Cardiac Rehab Phase I      Education:  Handouts provided and reviewed: [facility] Booklet.     Diet: Healthy Cardiac diet reviewed.    Disease Process: Disease process reviewed.    Reviewed the following: RISK FACTORS: discussed      SMOKING CESSATION: discussed - pt has been smoking for 70 years. He has willingness to quit, but will need support.      HOME EXERCISE ACTIVITY: reviewed      OUTPATIENT CARDIAC REHAB: discussed Phase 2 cardiac rehab with patient and grandson.

## 2025-01-06 NOTE — ED PROVIDER NOTES
Patient Seen in: [facility] Emergency Department    History     Chief Complaint   Patient presents with    Chest Pain       HPI    84-year-old male presents ER with complaint of chest pain.  Patient arrives with his wife and wife states he been having chest pain since 7 PM.  Patient with a past medical history of hypertension.  Patient states that he had episode of chest pain at 4 AM but had resolved.  Patient states that started again at 7 PM.  Patient arrives to the ER bradycardic denies any shortness of breath    History reviewed.   Past Medical History:    Allergic rhinitis    Centrilobular emphysema (HCC)    Essential hypertension    History of nephrolithiasis    Tobacco dependence    1 PPD at 14yoa, and 3 PPD since 17yoa.        History reviewed. No past surgical history on file.      Medications :  Prescriptions Prior to Admission[1]     Family History   Problem Relation Age of Onset    Cancer Father        Smoking Status:   Social History     Socioeconomic History    Marital status:    Tobacco Use    Smoking status: Every Day     Current packs/day: 3.00     Average packs/day: 3.0 packs/day for 71.0 years (213.0 ttl pk-yrs)     Types: Cigarettes     Start date: 1954    Smokeless tobacco: Never    Tobacco comments:     3 PPD smoker since 17yoa.   Vaping Use    Vaping status: Never Used   Substance and Sexual Activity    Alcohol use: Not Currently    Drug use: Never    Sexual activity: Not Currently       ROS  All pertinent positives for the review of systems are mentioned in the HPI  All other organ systems are reviewed and are negative.    Constitutional and vital signs reviewed.      Social History and Family History elements reviewed from today, pertinent positives to the presenting problem noted.    Physical Exam     ED Triage Vitals   BP 01/05/25 2011 109/88   Pulse 01/05/25 2010 (!) 34   Resp 01/05/25 2010 22   Temp 01/05/25 2011 97.5 °F (36.4 °C)   Temp src 01/05/25 2011 Oral   SpO2  01/05/25 2010 95 %   O2 Device 01/05/25 2010 None (Room air)       All measures to prevent infection transmission during my interaction with the patient were taken. The patient was already wearing a droplet mask on my arrival to the room. Personal protective equipment including droplet mask, eye protection, and gloves were worn throughout the duration of the exam.  Handwashing was performed prior to and after the exam.  Stethoscope and any equipment used during my examination was cleaned with super sani-cloth germicidal wipes following the exam.     Physical Exam  Vitals and nursing note reviewed.   Constitutional:       Appearance: He is well-developed.   HENT:      Head: Normocephalic and atraumatic.      Right Ear: External ear normal.      Left Ear: External ear normal.      Nose: Nose normal.   Eyes:      Conjunctiva/sclera: Conjunctivae normal.      Pupils: Pupils are equal, round, and reactive to light.   Cardiovascular:      Rate and Rhythm: Regular rhythm. Bradycardia present.      Heart sounds: Normal heart sounds.   Pulmonary:      Effort: Pulmonary effort is normal.      Breath sounds: Normal breath sounds.   Abdominal:      General: Bowel sounds are normal.      Palpations: Abdomen is soft.   Musculoskeletal:         General: Normal range of motion.      Cervical back: Normal range of motion and neck supple.   Skin:     General: Skin is warm and dry.   Neurological:      General: No focal deficit present.      Mental Status: He is alert and oriented to person, place, and time.      Deep Tendon Reflexes: Reflexes are normal and symmetric.   Psychiatric:         Behavior: Behavior normal.         Thought Content: Thought content normal.         Judgment: Judgment normal.         ED Course        Labs Reviewed   CBC WITH DIFFERENTIAL WITH PLATELET - Abnormal; Notable for the following components:       Result Value    WBC 11.2 (*)     Lymphocyte Absolute 4.59 (*)     All other components within normal  limits   BASIC METABOLIC PANEL (8)   TROPONIN I HIGH SENSITIVITY   PROTHROMBIN TIME (PT)   PTT, ACTIVATED     EKG    Rate, intervals and axes as noted on EKG Report.  Rate: 32  Rhythm: Sinus Rhythm  Reading: Diffuse ST elevation leads II, III and aVF             Imaging Results Available and Reviewed while in ED: No results found.  ED Medications Administered:   Medications   atropine 0.1 MG/ML injection (0.5 mg Intravenous Given 1/5/25 2016)   sodium chloride 0.9 % IV bolus 1,000 mL (1,000 mL Intravenous New Bag 1/5/25 2018)   aspirin chewable tab (162 mg Oral Canceled Entry 1/5/25 2021)   aspirin 300 MG rectal suppository 150 mg (has no administration in time range)   heparin (Porcine) 1000 UNIT/ML injection - BOLUS IV 60 Units/kg (has no administration in time range)   heparin (Porcine) 25014 units/250mL infusion ED INITIAL DOSE (has no administration in time range)   heparin (Porcine) 62375 units/250mL infusion ACS/AFIB CONTINUOUS (has no administration in time range)   heparin (Porcine) 25,000 Units/250mL infusion premix (has no administration in time range)   morphINE PF 2 MG/ML injection 2 mg (2 mg Intravenous Given 1/5/25 2021)         MDM     Vitals:    01/05/25 2011 01/05/25 2021 01/05/25 2025 01/05/25 2029   BP: 109/88 125/65 132/64    Pulse:  (!) 45 (!) 43    Resp:  19 15    Temp: 97.5 °F (36.4 °C)      TempSrc: Oral      SpO2:  92%     Weight:    65.8 kg     *I personally reviewed and interpreted all ED vitals.  I also personally reviewed all labs and imaging if ordered    Pulse Ox: 95%, Room air, Normal     Monitor Interpretation:   normal sinus rhythm    Differential Diagnosis/ Diagnostic Considerations: STEMI, NSTEMI, angina.    Medical Record Review: I personally reviewed available prior medical records for any recent pertinent discharge summaries, testing, and procedures and reviewed those reports.    Complicating Factors: The patient already has does not have any pertinent problems on file. to  contribute to the complexity of this ED evaluation.    Medical Decision Making  84-year-old male presents ER with complaint of chest pain since 7 PM.  Patient's EKG does show diffuse ST evaluation to 3 and aVF.  Patient likely with inferior wall MI.  Patient given aspirin as well as 1 L IV fluids, 0.5 atropine secondary to bradycardia and morphine 2 mg IV.  Cardiac alert was called upon arrival to the ER.  Discussed with Dr. Barr who states to start the patient on heparin bolus and drip.  Patient will go to Cath Lab for PCI.  Patient's wife bedside made aware of the disposition     Total Critical Care Time: 30 minutes including time spent examining and re-evaluating the patient, ordering and reviewing laboratory tests, documenting, reviewing previous records, obtaining information from the family, and speaking with consultants, admitting doctors, nurses and medics and excludes any time spent on procedures.      Problems Addressed:  ST elevation myocardial infarction (STEMI) involving other coronary artery of inferior wall (HCC): acute illness or injury    Amount and/or Complexity of Data Reviewed  Independent Historian:      Details: Medical history obtained from the wife who states that the patient started having pain approximate 7 PM.  Labs: ordered. Decision-making details documented in ED Course.        Condition upon leaving the department: Stable    Disposition and Plan     Clinical Impression:  1. ST elevation myocardial infarction (STEMI) involving other coronary artery of inferior wall (HCC)        Disposition:  Send to or/cath/gi    Follow-up:  No follow-up provider specified.    Medications Prescribed:  Current Discharge Medication List                 [1] (Not in a hospital admission)

## 2025-01-06 NOTE — ED INITIAL ASSESSMENT (HPI)
Pt presents to the ED with c/o midsternal chest, intermittently, for one day. Wife reports chest pain worsened today at 7pm +nausea w/ emesis. +sob.

## 2025-01-06 NOTE — PROGRESS NOTES
On call Dereck 01/06/25  Responded to code blue in cath lab.  Patient with episodes of V-tach though awake and breathing on my arrival.  Prepared to intubate but with interventions by cardiologist hemodynamics stabilized.  Cont to observe in cath lab he maintained good oxygenation and ventilation.  To be transferred to ICU to continue post cath care.  Amiodarone for v tach.    Add-0400 up alert oriented.  Requesting to go home so he can go to work.

## 2025-01-06 NOTE — CONSULTS
Cardiology Consult Note    Robert Dyer Patient Status:  Emergency    1940 MRN [id number]   Location [facility] EMERGENCY DEPARTMENT Attending Sudeep Finch, DO   Hosp Day # 0 PCP Hadley Marcus DO     [facility].  Robert Dyer is a 84 year old male with a history of hypertension, tobacco abuse, emphysema who developed chest pain at 4 AM which resolved followed by intermittent chest pain during work.  Chest pain returned at 7 PM tonight and wife brought patient to the emergency room for further evaluation.  In the ER, patient was noted to be bradycardic with heart rate in 30s and EKG confirms ST elevations in inferior and apical leads with complete heart block.  Cardiac alert was activated and  cardiology consulted for urgent evaluation and management of inferior STEMI, heart block.  Patient did receive atropine in the ER with improvement of the heart rate into the  upper 40s.  Blood pressure has been within low normal to normal range, responded to IV fluid boluses.  Heparin started and patient is being brought to the Cath Lab for emergent coronary catheterization.        --------------------------------------------------------------------------------------------------------------------------------  ROS 10 systems reviewed, pertinent findings above.  ROS    History:  Past Medical History:    Allergic rhinitis    Centrilobular emphysema (HCC)    Essential hypertension    History of nephrolithiasis    Tobacco dependence    1 PPD at 14yoa, and 3 PPD since 17yoa.      No past surgical history on file.  Family History   Problem Relation Age of Onset    Cancer Father       reports that he has been smoking cigarettes. He started smoking about 71 years ago. He has a 213 pack-year smoking history. He has never used smokeless tobacco. He reports that he does not currently use alcohol. He reports that he does not use drugs.    Objective:   Temp: 97.5 °F (36.4 °C)  Pulse: 43  Resp: 16  BP:  132/60    Intake/Output:     Intake/Output Summary (Last 24 hours) at 1/5/2025 2056  Last data filed at 1/5/2025 2045  Gross per 24 hour   Intake 900 ml   Output --   Net 900 ml       Physical Exam:     General: Alert and oriented x 3, no acute distress  HEENT: Normocephalic, anicteric sclera, neck supple.  Neck: No JVD, carotids 2+, no bruits.  Cardiac: Bradycardic. S1, S2 normal. No murmur, pericardial rub, S3.  Lungs: Clear without wheezes, rales, rhonchi or dullness.  Normal excursions and effort.  Abdomen: Soft, non-tender. BS-present.  Extremities: Without clubbing, cyanosis or edema.  Peripheral pulses are 2+.  Neurologic: Non-focal  Skin: Warm and dry.       Assessment:    Inferoapical STEMI, chest pain  Normal sinus rhythm with complete heart block  Tobacco abuse history  Hypertension  Emphysema      Plan:  Will recommend urgent coronary catheterization with PCI, likely TVP placement for complete heart block  Indications, risks, benefits and alternate options described to patient and he agrees to proceed with procedure.    Thank you for allowing me to take part in the care of Robert Dyer. Please call with any questions of concerns.    Level of care: C5    Dr. Latrice Barr,   January 05, 2025  8:56 PM

## 2025-01-06 NOTE — H&P
[facility]  part of [facility]                                                                                                          History and Physical     Robert Dyer Patient Status:  Inpatient    1940 MRN [id number]   Location [facility] 2W/SW Attending Latrice Barr DO   Hosp Day # 1 PCP Hadley Marcus DO       Chief Complaint: Chest pain    Subjective:    Robert Dyer is a 84 year old male with history of COPD, HTN, kidney stones, tobacco dependence who presented to the ED for evaluation of chest pain.  Chest pain has been intermittent throughout the day, worsened about an hour PTA.,  He took 162 mg aspirin prior to arrival.  On arrival to the ED, he was bradycardic.  Given morphine and atropine  Initial EKG showed diffuse ST elevation to leads III and aVF with concerns for inferior MI.  Cardiac alert called.  He was started on heparin drip and taken to Cath Lab.    Troponin 1648, potassium 3.3.  Otherwise stable  CXR: Advanced emphysema    He underwent cardiac catheterization with noted 100% acute thrombotic occlusion of mid RCA.  During cath he had episodes of ventricular fibrillation.  CODE BLUE called.  He was also noted with severe PAD with occluded right common iliac artery.  He had PCI to proximal and mid RCA.  Also had DCCV x 3.    During episodes of V. tach, he remained awake.  Did not require intubation.  Hemodynamics stabilized.  Post cath, he was admitted to the ICU under cardiology.  Hospitalist consulted this a.m. to take over as attending  Labs and vital stable.    At the time of my evaluation, he is sitting in chair.  Denies any chest pain or shortness of breath.  Reports feeling anxious about events.  Wanting to go home.    History/Other:      Past Medical History:  Past Medical History:    Allergic rhinitis    Centrilobular emphysema (HCC)    Essential hypertension    History of nephrolithiasis    Tobacco dependence    1 PPD at 14yoa, and  3 PPD since 17yoa.         Past Surgical History: No past surgical history on file.    Social History:  reports that he has been smoking cigarettes. He started smoking about 71 years ago. He has a 213 pack-year smoking history. He has never used smokeless tobacco. He reports that he does not currently use alcohol. He reports that he does not use drugs.    Family History:   Family History   Problem Relation Age of Onset    Cancer Father        Allergies: Allergies[1]    Medications:  Medications Ordered Prior to Encounter[2]    Review of Systems:   A comprehensive 14 point review of systems was completed.    Pertinent positives and negatives noted in the HPI.    Objective:     /57 (BP Location: Right arm)   Pulse 57   Temp 98 °F (36.7 °C) (Temporal)   Resp 19   Ht 5' 8\" (1.727 m)   Wt 143 lb 8.3 oz (65.1 kg)   SpO2 92%   BMI 21.82 kg/m²   General: No acute distress.    HEENT: Normocephalic, atraumatic.  Neck: Alert, oriented x 3.  Nonfocal.  Respiratory: Normal effort.  CTAB  Cardiovascular: No tenderness or deformity.   Abdomen: Obese, soft, nontender distended.  Neurologic: Alert, oriented x 3.  Nonfocal.  Musculoskeletal: No tenderness or deformity.  Extremities: No edema  Psychiatric: Appropriate    Results:      Labs:  Labs Last 24 Hours:   BMP     CBC    Other     Na 142 Cl 107 BUN 13 Glu 175   Hb 15.0   PTT 27.3 Procal -   K 3.3 CO2 24.0 Cr 1.12   WBC 10.9 >< .0  INR 0.96 CRP -   Renal Lytes Endo    Hct 42.6   Trop - D dim -   eGFR - Ca 9.8 POC Gluc  -    LFT   pBNP - Lactic -   eGFR AA - PO4 - A1c -   AST - APk - Prot -  LDL -     Mg - TSH -   ALT - T mike - Alb -          COVID-19 Lab Results    COVID-19  Lab Results   Component Value Date    COVID19 Not Detected 12/23/2024    COVID19 Not Detected 04/26/2024       Pro-Calcitonin  No results for input(s): \"PCT\" in the last 168 hours.    Cardiac  No results for input(s): \"TROP\", \"PBNP\" in the last 168 hours.    Creatinine Kinase  No results  for input(s): \"CK\" in the last 168 hours.    Inflammatory Markers  No results for input(s): \"CRP\", \"SILVIA\", \"LDH\", \"DDIMER\" in the last 168 hours.    Imaging: Imaging data reviewed in Epic.    Assessment & Plan:    STEMI with 100% RCA occlusion status post PCI  Ventricular fibrillation s/p DCCV  Bradycardia likely due to STEMI s/p atropine.  Resolved  -Continue ICU  -Amiodarone drip per cardiology  -Aspirin, Brilinta, statin  -Critical care following    Hypokalemia  -Replaced per protocol    Hyperglycemia  -Check A1c    HLD  -Statin    PAD, noted on cardiac cath  -Aspirin, statin    COPD, currently not in exacerbation  Tobacco dependence  -Smoking cessation encouraged    HTN  -Meds as appropriate      Quality:  DVT Prophylaxis: Got heparin precath.  Further anticoagulation pending course  CODE status: Full code  ANTONINA: TBD          Plan of care discussed with patient. Acknowledged understanding and agrees to plan. Also discussed with the ED physician.    55 minutes spent on this admission - examining patient, obtaining history, reviewing previous medical records, going over test results/imaging and discussing plan of care. More than 50% of the time was spent in counseling and/or coordination of care related to STEMI, V-fib.   All questions answered.     Whit Kimble MD  2025                   [1]   Allergies  Allergen Reactions    Eggs Or Egg-Derived Products RASH   [2]   No current facility-administered medications on file prior to encounter.     Current Outpatient Medications on File Prior to Encounter   Medication Sig Dispense Refill    amLODIPine 10 MG Oral Tab Take 1 tablet (10 mg total) by mouth daily. 90 tablet 3    albuterol 108 (90 Base) MCG/ACT Inhalation Aero Soln Inhale 2 puffs into the lungs every 6 (six) hours as needed for Wheezing. 1 each 1    fluticasone propionate 50 MCG/ACT Nasal Suspension 2 sprays by Nasal route daily. 16 g 5    [] oseltamivir (TAMIFLU) 75 MG Oral Cap Take 1 capsule  (75 mg total) by mouth 2 (two) times daily for 5 days. 10 capsule 0    chlorproMAZINE 25 MG Oral Tab Take 1 tablet (25 mg total) by mouth every 6 (six) hours as needed. 30 tablet 0    famotidine 20 MG Oral Tab Take 1 tablet (20 mg total) by mouth 2 (two) times daily. 90 tablet 0

## 2025-01-06 NOTE — CM/SW NOTE
01/06/25 1500   CM/SW Referral Data   Referral Source    Reason for Referral Discharge planning   Informant Patient;Spouse/Significant Other;Daughter   Medical Hx   Does patient have an established PCP? Yes  (Hadley Marcus)   Patient Info   Patient's Current Mental Status at Time of Assessment Alert;Oriented   Patient's Home Environment Condo/Apt no elevator   Number of Levels in Home 1   Number of Stair in Home 1st floor apt   Patient lives with Spouse/Significant other   Patient Status Prior to Admission   Independent with ADLs and Mobility Yes   Discharge Needs   Anticipated D/C needs To be determined     Pt discussed during nursing rounds. Dx STEMI on RA, hx emphysema, smoker. Home w/spouse, independent w/o device prior to admission. Pt has no DME at home. PT/OT evals requested for dc recommendation.     1/7/2025 at 1450: Anticipated therapy need: Home.     Plan: Home w/spouse pending medical clearance.    / to remain available for support and/or discharge planning.     NO Lopez    [phone]

## 2025-01-06 NOTE — PROGRESS NOTES
[facility] - CARDIOLOGY PROGRESS NOTE  Robert Dyer Patient Status:  Inpatient    1940 MRN [id number]   Location [facility] 2W/SW Attending Reina Bowman MD   Hosp Day # 1 PCP Hadley Marcus DO     Assessment:    1.  CAD.  Presentation with inferior wall MI last evening.  Cath revealed total occlusion of the proximal RCA and mild to moderate disease in the LAD.  Status post PCI of RCA with excellent angiographic results and good flow.  Has remained pain-free since then.  Stable blood pressure and rhythm.    On DAPT, statin.  No beta-blocker at this time due to resting bradycardia.      Plan:    Continue current medical therapy.    Ambulate in room.  If stable, could transfer to telemetry later today.    Echocardiogram today.    Discontinue amiodarone drip.      Subjective:  No chest pain or shortness of breath.    Objective:  /63 (BP Location: Right arm)   Pulse 56   Temp 98 °F (36.7 °C) (Temporal)   Resp 14   Ht 172.7 cm (5' 8\")   Wt 143 lb 8.3 oz (65.1 kg)   SpO2 95%   BMI 21.82 kg/m²     Temp (24hrs), Av.9 °F (36.6 °C), Min:97.5 °F (36.4 °C), Max:98 °F (36.7 °C)      Intake/Output:    Intake/Output Summary (Last 24 hours) at 2025 0911  Last data filed at 2025 0552  Gross per 24 hour   Intake 1274 ml   Output 500 ml   Net 774 ml       Wt Readings from Last 2 Encounters:   25 143 lb 8.3 oz (65.1 kg)   24 137 lb (62.1 kg)       Physical Exam:    General: Alert and oriented x 3,  No apparent distress.  HEENT: No focal deficits.  Neck: No JVD, carotids 2+ no bruits.  Cardiac: Regular rate and rhythm, S1, S2 normal, no murmur  Lungs: Clear without wheezes, rales, rhonchi.  Normal excursions and effort.  Abdomen: Soft, non-tender.   Extremities: Without clubbing, cyanosis or edema.  Peripheral pulses are 2+.  Both groin sites soft, nontender.  Skin: Warm  and dry.     Labs:  Lab Results   Component Value Date    WBC 10.9 01/06/2025    HGB 15.0 01/06/2025    HCT 42.6 01/06/2025    .0 01/06/2025     Lab Results   Component Value Date    INR 0.96 01/05/2025     Lab Results   Component Value Date     01/06/2025    K 4.2 01/06/2025     01/06/2025    CO2 24.0 01/06/2025    BUN 13 01/06/2025    CREATSERUM 1.04 01/06/2025     01/06/2025    CA 9.5 01/06/2025     Lab Results   Component Value Date    LDL 89 01/05/2025    HDL 39 01/05/2025    TRIG 184 01/05/2025    VLDL 30 01/05/2025     No results found for: \"TROP\", \"CKMB\"     Medications:     aspirin  150 mg Rectal Once    ticagrelor  90 mg Oral Q12H    aspirin  81 mg Oral Daily    rosuvastatin  40 mg Oral Nightly      amiodarone 0.5 mg/min (01/06/25 0534)       Jordy Tovar MD  1/6/2025  9:11 AM

## 2025-01-07 ENCOUNTER — APPOINTMENT (OUTPATIENT)
Dept: CT IMAGING | Facility: HOSPITAL | Age: 85
End: 2025-01-07
Payer: MEDICARE

## 2025-01-07 ENCOUNTER — TELEPHONE (OUTPATIENT)
Dept: SURGERY | Facility: CLINIC | Age: 85
End: 2025-01-07

## 2025-01-07 LAB
ALBUMIN SERPL-MCNC: 3.7 G/DL (ref 3.2–4.8)
ALBUMIN SERPL-MCNC: 3.7 G/DL (ref 3.2–4.8)
ALP LIVER SERPL-CCNC: 81 U/L
ALT SERPL-CCNC: 56 U/L
ANION GAP SERPL CALC-SCNC: 9 MMOL/L (ref 0–18)
AST SERPL-CCNC: 85 U/L (ref ?–34)
BASOPHILS # BLD AUTO: 0.02 X10(3) UL (ref 0–0.2)
BASOPHILS NFR BLD AUTO: 0.2 %
BILIRUB DIRECT SERPL-MCNC: 0.3 MG/DL (ref ?–0.3)
BILIRUB SERPL-MCNC: 0.9 MG/DL (ref 0.2–1.1)
BILIRUB UR QL: NEGATIVE
BUN BLD-MCNC: 11 MG/DL (ref 9–23)
BUN/CREAT SERPL: 11.2 (ref 10–20)
C DIFF TOX B STL QL: NEGATIVE
CALCIUM BLD-MCNC: 8.9 MG/DL (ref 8.7–10.4)
CHLORIDE SERPL-SCNC: 110 MMOL/L (ref 98–112)
CO2 SERPL-SCNC: 23 MMOL/L (ref 21–32)
CREAT BLD-MCNC: 0.98 MG/DL
DEPRECATED RDW RBC AUTO: 42.9 FL (ref 35.1–46.3)
EGFRCR SERPLBLD CKD-EPI 2021: 77 ML/MIN/1.73M2 (ref 60–?)
EOSINOPHIL # BLD AUTO: 0.18 X10(3) UL (ref 0–0.7)
EOSINOPHIL NFR BLD AUTO: 1.5 %
ERYTHROCYTE [DISTWIDTH] IN BLOOD BY AUTOMATED COUNT: 13.1 % (ref 11–15)
GLUCOSE BLD-MCNC: 106 MG/DL (ref 70–99)
GLUCOSE UR-MCNC: NORMAL MG/DL
HCT VFR BLD AUTO: 39 %
HGB BLD-MCNC: 14 G/DL
IMM GRANULOCYTES # BLD AUTO: 0.04 X10(3) UL (ref 0–1)
IMM GRANULOCYTES NFR BLD: 0.3 %
LEUKOCYTE ESTERASE UR QL STRIP.AUTO: 75
LIPASE SERPL-CCNC: 26 U/L (ref 12–53)
LYMPHOCYTES # BLD AUTO: 1.67 X10(3) UL (ref 1–4)
LYMPHOCYTES NFR BLD AUTO: 14.3 %
MAGNESIUM SERPL-MCNC: 1.9 MG/DL (ref 1.6–2.6)
MCH RBC QN AUTO: 32.6 PG (ref 26–34)
MCHC RBC AUTO-ENTMCNC: 35.9 G/DL (ref 31–37)
MCV RBC AUTO: 90.7 FL
MONOCYTES # BLD AUTO: 0.98 X10(3) UL (ref 0.1–1)
MONOCYTES NFR BLD AUTO: 8.4 %
NEUTROPHILS # BLD AUTO: 8.8 X10 (3) UL (ref 1.5–7.7)
NEUTROPHILS # BLD AUTO: 8.8 X10(3) UL (ref 1.5–7.7)
NEUTROPHILS NFR BLD AUTO: 75.3 %
NITRITE UR QL STRIP.AUTO: NEGATIVE
NON GYNE INTERPRETATION: NEGATIVE
OSMOLALITY SERPL CALC.SUM OF ELEC: 294 MOSM/KG (ref 275–295)
PH UR: 6 [PH] (ref 5–8)
PHOSPHATE SERPL-MCNC: 2.6 MG/DL (ref 2.4–5.1)
PLATELET # BLD AUTO: 165 10(3)UL (ref 150–450)
POTASSIUM SERPL-SCNC: 4.1 MMOL/L (ref 3.5–5.1)
POTASSIUM SERPL-SCNC: 4.1 MMOL/L (ref 3.5–5.1)
PROT SERPL-MCNC: 6.1 G/DL (ref 5.7–8.2)
PROT UR-MCNC: 100 MG/DL
RBC # BLD AUTO: 4.3 X10(6)UL
RBC #/AREA URNS AUTO: >10 /HPF
SODIUM SERPL-SCNC: 142 MMOL/L (ref 136–145)
SP GR UR STRIP: 1.02 (ref 1–1.03)
UROBILINOGEN UR STRIP-ACNC: NORMAL
WBC # BLD AUTO: 11.7 X10(3) UL (ref 4–11)

## 2025-01-07 PROCEDURE — 99233 SBSQ HOSP IP/OBS HIGH 50: CPT | Performed by: INTERNAL MEDICINE

## 2025-01-07 PROCEDURE — 74178 CT ABD&PLV WO CNTR FLWD CNTR: CPT

## 2025-01-07 PROCEDURE — 76377 3D RENDER W/INTRP POSTPROCES: CPT

## 2025-01-07 PROCEDURE — 99223 1ST HOSP IP/OBS HIGH 75: CPT

## 2025-01-07 RX ORDER — TAMSULOSIN HYDROCHLORIDE 0.4 MG/1
0.4 CAPSULE ORAL DAILY
Status: DISCONTINUED | OUTPATIENT
Start: 2025-01-07 | End: 2025-01-08

## 2025-01-07 RX ORDER — LOPERAMIDE HYDROCHLORIDE 2 MG/1
2 CAPSULE ORAL
Status: DISCONTINUED | OUTPATIENT
Start: 2025-01-07 | End: 2025-01-08

## 2025-01-07 RX ORDER — METOPROLOL SUCCINATE 25 MG/1
25 TABLET, EXTENDED RELEASE ORAL
Status: DISCONTINUED | OUTPATIENT
Start: 2025-01-07 | End: 2025-01-08

## 2025-01-07 RX ORDER — FINASTERIDE 5 MG/1
5 TABLET, FILM COATED ORAL DAILY
Status: DISCONTINUED | OUTPATIENT
Start: 2025-01-07 | End: 2025-01-08

## 2025-01-07 NOTE — PAYOR COMM NOTE
--------------  ADMISSION REVIEW     Payor: [employer]  Subscriber #:  [id number]  Authorization Number: [id number]    Admit date: 1/5/25  Admit time: 10:25 PM       REVIEW DOCUMENTATION:     ED Provider Notes        ED Provider Notes signed by Sudeep Finch DO at 1/5/2025  8:31 PM       Author: Sudeep Finch DO Service: -- Author Type: Physician    Filed: 1/5/2025  8:31 PM Date of Service: 1/5/2025  8:22 PM Status: Signed    : Sudeep Finch DO (Physician)         Patient Seen in: [facility] Emergency Department    History     Chief Complaint   Patient presents with    Chest Pain       HPI    84-year-old male presents ER with complaint of chest pain.  Patient arrives with his wife and wife states he been having chest pain since 7 PM.  Patient with a past medical history of hypertension.  Patient states that he had episode of chest pain at 4 AM but had resolved.  Patient states that started again at 7 PM.  Patient arrives to the ER bradycardic denies any shortness of breath    History reviewed.   Past Medical History:    Allergic rhinitis    Centrilobular emphysema (HCC)    Essential hypertension    History of nephrolithiasis    Tobacco dependence    1 PPD at 14yoa, and 3 PPD since 17yoa.        History reviewed. No past surgical history on file.      Medications :  Prescriptions Prior to Admission[1]     Family History   Problem Relation Age of Onset    Cancer Father        Smoking Status:   Social History     Socioeconomic History    Marital status:    Tobacco Use    Smoking status: Every Day     Current packs/day: 3.00     Average packs/day: 3.0 packs/day for 71.0 years (213.0 ttl pk-yrs)     Types: Cigarettes     Start date: 1954    Smokeless tobacco: Never    Tobacco comments:     3 PPD smoker since 17yoa.   Vaping Use    Vaping status: Never Used   Substance and Sexual Activity    Alcohol use: Not Currently    Drug use: Never    Sexual activity: Not Currently        ROS  All pertinent positives for the review of systems are mentioned in the HPI  All other organ systems are reviewed and are negative.    Constitutional and vital signs reviewed.      Social History and Family History elements reviewed from today, pertinent positives to the presenting problem noted.    Physical Exam     ED Triage Vitals   BP 01/05/25 2011 109/88   Pulse 01/05/25 2010 (!) 34   Resp 01/05/25 2010 22   Temp 01/05/25 2011 97.5 °F (36.4 °C)   Temp src 01/05/25 2011 Oral   SpO2 01/05/25 2010 95 %   O2 Device 01/05/25 2010 None (Room air)       All measures to prevent infection transmission during my interaction with the patient were taken. The patient was already wearing a droplet mask on my arrival to the room. Personal protective equipment including droplet mask, eye protection, and gloves were worn throughout the duration of the exam.  Handwashing was performed prior to and after the exam.  Stethoscope and any equipment used during my examination was cleaned with super sani-cloth germicidal wipes following the exam.     Physical Exam  Vitals and nursing note reviewed.   Constitutional:       Appearance: He is well-developed.   HENT:      Head: Normocephalic and atraumatic.      Right Ear: External ear normal.      Left Ear: External ear normal.      Nose: Nose normal.   Eyes:      Conjunctiva/sclera: Conjunctivae normal.      Pupils: Pupils are equal, round, and reactive to light.   Cardiovascular:      Rate and Rhythm: Regular rhythm. Bradycardia present.      Heart sounds: Normal heart sounds.   Pulmonary:      Effort: Pulmonary effort is normal.      Breath sounds: Normal breath sounds.   Abdominal:      General: Bowel sounds are normal.      Palpations: Abdomen is soft.   Musculoskeletal:         General: Normal range of motion.      Cervical back: Normal range of motion and neck supple.   Skin:     General: Skin is warm and dry.   Neurological:      General: No focal deficit present.       Mental Status: He is alert and oriented to person, place, and time.      Deep Tendon Reflexes: Reflexes are normal and symmetric.   Psychiatric:         Behavior: Behavior normal.         Thought Content: Thought content normal.         Judgment: Judgment normal.         ED Course        Labs Reviewed   CBC WITH DIFFERENTIAL WITH PLATELET - Abnormal; Notable for the following components:       Result Value    WBC 11.2 (*)     Lymphocyte Absolute 4.59 (*)     All other components within normal limits   BASIC METABOLIC PANEL (8)   TROPONIN I HIGH SENSITIVITY   PROTHROMBIN TIME (PT)   PTT, ACTIVATED     EKG    Rate, intervals and axes as noted on EKG Report.  Rate: 32  Rhythm: Sinus Rhythm  Reading: Diffuse ST elevation leads II, III and aVF             Imaging Results Available and Reviewed while in ED: No results found.  ED Medications Administered:   Medications   atropine 0.1 MG/ML injection (0.5 mg Intravenous Given 1/5/25 2016)   sodium chloride 0.9 % IV bolus 1,000 mL (1,000 mL Intravenous New Bag 1/5/25 2018)   aspirin chewable tab (162 mg Oral Canceled Entry 1/5/25 2021)   aspirin 300 MG rectal suppository 150 mg (has no administration in time range)   heparin (Porcine) 1000 UNIT/ML injection - BOLUS IV 60 Units/kg (has no administration in time range)   heparin (Porcine) 54078 units/250mL infusion ED INITIAL DOSE (has no administration in time range)   heparin (Porcine) 42685 units/250mL infusion ACS/AFIB CONTINUOUS (has no administration in time range)   heparin (Porcine) 25,000 Units/250mL infusion premix (has no administration in time range)   morphINE PF 2 MG/ML injection 2 mg (2 mg Intravenous Given 1/5/25 2021)         MDM     Vitals:    01/05/25 2011 01/05/25 2021 01/05/25 2025 01/05/25 2029   BP: 109/88 125/65 132/64    Pulse:  (!) 45 (!) 43    Resp:  19 15    Temp: 97.5 °F (36.4 °C)      TempSrc: Oral      SpO2:  92%     Weight:    65.8 kg     *I personally reviewed and interpreted all ED vitals.  I  also personally reviewed all labs and imaging if ordered    Pulse Ox: 95%, Room air, Normal     Monitor Interpretation:   normal sinus rhythm    Differential Diagnosis/ Diagnostic Considerations: STEMI, NSTEMI, angina.    Medical Record Review: I personally reviewed available prior medical records for any recent pertinent discharge summaries, testing, and procedures and reviewed those reports.    Complicating Factors: The patient already has does not have any pertinent problems on file. to contribute to the complexity of this ED evaluation.    Medical Decision Making  84-year-old male presents ER with complaint of chest pain since 7 PM.  Patient's EKG does show diffuse ST evaluation to 3 and aVF.  Patient likely with inferior wall MI.  Patient given aspirin as well as 1 L IV fluids, 0.5 atropine secondary to bradycardia and morphine 2 mg IV.  Cardiac alert was called upon arrival to the ER.  Discussed with Dr. Barr who states to start the patient on heparin bolus and drip.  Patient will go to Cath Lab for PCI.  Patient's wife bedside made aware of the disposition     Total Critical Care Time: 30 minutes including time spent examining and re-evaluating the patient, ordering and reviewing laboratory tests, documenting, reviewing previous records, obtaining information from the family, and speaking with consultants, admitting doctors, nurses and medics and excludes any time spent on procedures.      Problems Addressed:  ST elevation myocardial infarction (STEMI) involving other coronary artery of inferior wall (HCC): acute illness or injury    Amount and/or Complexity of Data Reviewed  Independent Historian:      Details: Medical history obtained from the wife who states that the patient started having pain approximate 7 PM.  Labs: ordered. Decision-making details documented in ED Course.        Condition upon leaving the department: Stable    Disposition and Plan     Clinical Impression:  1. ST elevation myocardial  infarction (STEMI) involving other coronary artery of inferior wall (HCC)        Disposition:  Send to or/cath/gi    Follow-up:  No follow-up provider specified.    Medications Prescribed:  Current Discharge Medication List                Signed by Sudeep Finch DO on 1/5/2025  8:31 PM         MEDICATIONS ADMINISTERED IN LAST 1 DAY:  aspirin DR tab 81 mg       Date Action Dose Route User    1/7/2025 0856 Given 81 mg Oral Elisha Bradshaw RN          finasteride (Proscar) tab 5 mg       Date Action Dose Route User    1/7/2025 0856 Given 5 mg Oral Elisha Bradshaw RN          iopamidol 76% (ISOVUE-370) injection for power injector       Date Action Dose Route User    1/7/2025 1025 Given 80 mL Intravenous Wander Cee          lidocaine (Urojet) 2 % urethral jelly 10 mL       Date Action Dose Route User    1/6/2025 1817 Given 10 mL Urethral Lyn Palmer RN          loperamide (Imodium) cap 2 mg       Date Action Dose Route User    1/7/2025 1445 Given 2 mg Oral Elisha Bradshaw RN          metoprolol succinate ER (Toprol XL) 24 hr tab 25 mg       Date Action Dose Route User    1/7/2025 1056 Given 25 mg Oral Elisha Bradshaw RN          ondansetron (Zofran) 4 MG/2ML injection 4 mg       Date Action Dose Route User    1/6/2025 2150 Given 4 mg Intravenous Noa Todd RN          potassium chloride (Klor-Con M20) tab 40 mEq       Date Action Dose Route User    1/6/2025 2127 Given 40 mEq Oral Noa Todd RN          rosuvastatin (Crestor) tab 40 mg       Date Action Dose Route User    1/6/2025 2127 Given 40 mg Oral Noa Todd RN          tamsulosin (Flomax) cap 0.4 mg       Date Action Dose Route User    1/7/2025 0856 Given 0.4 mg Oral Elisha Bradshaw RN          ticagrelor (Brilinta) tab 90 mg       Date Action Dose Route User    1/7/2025 1056 Given 90 mg Oral Elisha Bradshaw RN    1/6/2025 2130 Given 90 mg Oral Perez, Noa, RN            Vitals (last day)       Date/Time Temp Pulse Resp BP SpO2  Weight O2 Device O2 Flow Rate (L/min) [facility]    01/07/25 1320 -- 66 -- -- -- -- -- -- GR    01/07/25 1200 98 °F (36.7 °C) 65 17 111/69 95 % -- None (Room air) -- [place]    01/07/25 1000 -- 65 14 125/76 91 % -- None (Room air) -- [place]    01/07/25 0800 97.7 °F (36.5 °C) 98 20 127/93 96 % -- None (Room air) -- [place]    01/07/25 0600 -- 60 15 112/78 93 % 143 lb 4.8 oz (65 kg) Nasal cannula 2 L/min     01/07/25 0400 97.5 °F (36.4 °C) 59 19 127/69 93 % -- Nasal cannula 2 L/min     01/07/25 0200 -- 50 23 110/71 94 % -- Nasal cannula 2 L/min     01/07/25 0005 -- 57 20 -- 88 % -- Nasal cannula 2 L/min     01/07/25 0000 97 °F (36.1 °C) 57 20 115/65 90 % -- None (Room air) --     01/06/25 2200 -- 59 20 128/66 93 % -- None (Room air) --     01/06/25 2100 -- 61 20 122/72 93 % -- None (Room air) --     01/06/25 2000 96.5 °F (35.8 °C) 58 18 117/69 91 % -- None (Room air) --     01/06/25 1900 -- 89 17 150/80 92 % -- None (Room air) -- [place]    01/06/25 1800 -- 83 28 149/72 94 % -- None (Room air) -- [place]    01/06/25 1700 -- 81 20 162/83 93 % -- None (Room air) -- [place]    01/06/25 1600 98.1 °F (36.7 °C) 95 25 161/86 94 % -- None (Room air) -- [place]    01/06/25 1500 -- 70 21 141/88 93 % -- None (Room air) -- [place]    01/06/25 1400 -- 50 18 129/63 95 % -- None (Room air) -- MT    01/06/25 1300 -- 52 21 126/55 96 % -- None (Room air) -- MT    01/06/25 1200 97.5 °F (36.4 °C) 58 16 132/98 95 % -- None (Room air) -- MT    01/06/25 1100 -- 58 13 132/63 91 % -- None (Room air) -- [place]    01/06/25 1000 -- 53 15 123/62 93 % -- None (Room air) -- MT    01/06/25 0900 97.6 °F (36.4 °C) 56 18 120/61 93 % -- None (Room air) -- MT    01/06/25 0800 -- 53 16 112/62 91 % -- None (Room air) -- MT    01/06/25 0700 -- 70 15 118/61 97 % -- None (Room air) -- [place]    01/06/25 0600 -- 56 14 107/63 95 % -- None (Room air) --     01/06/25 0500 -- 59 18 113/60 94 % -- None (Room air) --     01/06/25 0400 98 °F (36.7 °C) 57 19 126/57 92 % 143 lb 8.3 oz (65.1 kg) None  (Room air) -- [place]    01/06/25 0300 -- 55 15 94/65 91 % -- Nasal cannula 4 L/min SF    01/06/25 0200 -- 54 15 94/58 97 % -- Nasal cannula 4 L/min SF    01/06/25 0100 -- 60 17 102/61 93 % -- Nasal cannula 4 L/min SF    01/06/25 0030 -- 79 17 120/80 95 % -- Nasal cannula 4 L/min SF    01/06/25 0000 98 °F (36.7 °C) 73 17 112/69 98 % -- Nasal cannula 4 L/min SF         1/5 CARDIOLOGY CONSULT NOTE      HPI.  Robert Dyer is a 84 year old male with a history of hypertension, tobacco abuse, emphysema who developed chest pain at 4 AM which resolved followed by intermittent chest pain during work.  Chest pain returned at 7 PM tonight and wife brought patient to the emergency room for further evaluation.  In the ER, patient was noted to be bradycardic with heart rate in 30s and EKG confirms ST elevations in inferior and apical leads with complete heart block.  Cardiac alert was activated and  cardiology consulted for urgent evaluation and management of inferior STEMI, heart block.  Patient did receive atropine in the ER with improvement of the heart rate into the  upper 40s.  Blood pressure has been within low normal to normal range, responded to IV fluid boluses.  Heparin started and patient is being brought to the Cath Lab for emergent coronary catheterization.          --------------------------------------------------------------------------------------------------------------------------------  ROS 10 systems reviewed, pertinent findings above.  ROS     History:  Past Medical History       Past Medical History:    Allergic rhinitis    Centrilobular emphysema (HCC)    Essential hypertension    History of nephrolithiasis    Tobacco dependence     1 PPD at 14yoa, and 3 PPD since 17yoa.          Past Surgical History   No past surgical history on file.     Family History         Family History   Problem Relation Age of Onset    Cancer Father            reports that he has been smoking cigarettes. He started smoking about  71 years ago. He has a 213 pack-year smoking history. He has never used smokeless tobacco. He reports that he does not currently use alcohol. He reports that he does not use drugs.     Objective:   Temp: 97.5 °F (36.4 °C)  Pulse: 43  Resp: 16  BP: 132/60     Intake/Output:      Intake/Output Summary (Last 24 hours) at 1/5/2025 2056  Last data filed at 1/5/2025 2045      Gross per 24 hour   Intake 900 ml   Output --   Net 900 ml         Physical Exam:      General: Alert and oriented x 3, no acute distress  HEENT: Normocephalic, anicteric sclera, neck supple.  Neck: No JVD, carotids 2+, no bruits.  Cardiac: Bradycardic. S1, S2 normal. No murmur, pericardial rub, S3.  Lungs: Clear without wheezes, rales, rhonchi or dullness.  Normal excursions and effort.  Abdomen: Soft, non-tender. BS-present.  Extremities: Without clubbing, cyanosis or edema.  Peripheral pulses are 2+.  Neurologic: Non-focal  Skin: Warm and dry.         Assessment:    Inferoapical STEMI, chest pain  Normal sinus rhythm with complete heart block  Tobacco abuse history  Hypertension  Emphysema        Plan:  Will recommend urgent coronary catheterization with PCI, likely TVP placement for complete heart block  Indications, risks, benefits and alternate options described to patient and he agrees to proceed with procedure.     1/5 PROCEDURE NOTE  Date of Procedure: 1/5/2025      Preoperative Diagnosis:  1) Chest pain, inferior STEMI  2) Complete heart block     Postoperative Diagnosis:  1) Inferior STEMI due to 100% acute thrombotic occlusion of mid RCA  2) Ventricular fibrillation X3, DCCV X3  3) Severe PAD, occluded right common iliac artery     Procedures Performed:  1) Selective Left and Right Coronary angiogram  2) Left Heart Catheterization  3) Successful IVUS guided PCI of proximal and mid RCA X3 with 4 x 18, 4 x 22, 3.5 x 12 mm resolute Cesar NIGHAT  4) DCCV X3  5) TVP placement, TVP removal     Indication/History: Robert Dyer is a 84 year old  male with history of tobacco use, hypertension, emphysema presenting with worsening chest pain throughout the day.  Noted to have complete heart block and inferior STEMI on EKG, Cath Lab activated for  emergent coronary catheterization, PCI.     Summary of Case: After written informed consent was obtained from the patient, patient was brought to the cardiac catheterization laboratory.  Patient was prepped and draped in the usual sterile fashion. Lidocaine 1% was used to infiltrate the right and left groins for local anesthesia and a 6 Norwegian introducer sheath was inserted into the right femoral vein and left femoral artery using modified Seldinger technique.       Selective coronary angiography performed with 6 Norwegian JR4 catheter for RCA and 6 Norwegian JL 4 catheter for LCA.  Angiography performed in standard projections.  6 Norwegian pigtail catheter used to cross [employer] perform left heart catheterization, left ventriculogram.     Selective left femoral angiogram done assess anatomy for closure.     Findings:  1) Left Ventriculography at 30 degrees LEE: LVEF 55 to 60%, basal inferior wall hypokinesia  2) Hemodynamics: LVEDP 15 mmHg, no grained upon pullback across aortic valve  3) Coronaries:  Left main coronary artery: Large size vessel with no angiographically significant disease.  Left anterior descending artery: Large size, Type IV vessel with  luminal irregularities of the mid segment.  Ramus intermedius: Hazy 70% stenosis of mid segment  Circumflex artery: Medium size non-dominant vessel with  no angiographically significant disease.  Right coronary artery: Large size dominant vessel with 70% proximal stenosis, 100% thrombotic occlusion of mid segment.      Peripheral vasculature:  Right common iliac artery: Short segment occlusion of the proximal segment  Left common iliac artery: Moderate calcified disease of proximal segment.     Specimen sent to: No specimen collected  Estimated blood loss: 10cc  Closure:  Perclose left femoral artery, Mynx closure of right femoral vein        Lesion #1 proximal, mid RCA  Lesion Characteristics- non torturous, noncalcified.  Pre-intervention stenosis 100%, Post intervention stenosis 0%.  Pre BLUE 0, Post BLUE 3.      Guide Catheter: JR4 6 Albanian guide  Wire: Scion blue  Pre-dilation Balloon: 2.5 x 15 mm balloon  Stent: 4 x 18, 4 x 22, four 3.5 x 12 mm resolute Cesar NIGHAT in overlapping fashion from proximal to distal  Post-dilation Balloon: 3.5 mm NC in mid segment     IV was maintained by RN and fentanyl 50 mcg was given.  Patient was assessed and monitoring of oxygen, heart rate and blood pressure by nurse and myself during the exam from 2111 to 2203.        Assessment and Plan:  84-year-old male with above history presenting with inferior STEMI and complete heart block.  To  stabilize rhythm, TVP was introduced however patient developed ventricular fibrillation 3 times during this portion of the procedure.  Required electrical cardioversion x 3, did not require CPR.  He did also receive atropine for bradycardia, 1 dose of epinephrine 1 mg for hypotension.  CODE BLUE was called however patient did not require CPR and decided to hold off on intubation as patient was mentating fine and rhythm stabilized.     Right femoral arterial site was abandoned due to  of right common iliac artery.  Left femoral artery  access was obtained and coronary angiography as well as PCI were performed as detailed above.  At the end of the procedure, patient was chest pain free, he was back in sinus rhythm with one-to-one conduction and no recurrent ventricular fibrillation.  Will recommend low-dose amiodarone infusion overnight.     Continue aspirin, Brilinta, statin.  Check echocardiogram in the morning.  Hold off on beta-blocker at this time.     1/6 H&P    Chief Complaint: Chest pain        Subjective:  Robert Dyer is a 84 year old male with history of COPD, HTN, kidney stones, tobacco  dependence who presented to the ED for evaluation of chest pain.  Chest pain has been intermittent throughout the day, worsened about an hour PTA.,  He took 162 mg aspirin prior to arrival.  On arrival to the ED, he was bradycardic.  Given morphine and atropine  Initial EKG showed diffuse ST elevation to leads III and aVF with concerns for inferior MI.  Cardiac alert called.  He was started on heparin drip and taken to Cath Lab.     Troponin 1648, potassium 3.3.  Otherwise stable  CXR: Advanced emphysema     He underwent cardiac catheterization with noted 100% acute thrombotic occlusion of mid RCA.  During cath he had episodes of ventricular fibrillation.  CODE BLUE called.  He was also noted with severe PAD with occluded right common iliac artery.  He had PCI to proximal and mid RCA.  Also had DCCV x 3.     During episodes of V. tach, he remained awake.  Did not require intubation.  Hemodynamics stabilized.  Post cath, he was admitted to the ICU under cardiology.  Hospitalist consulted this a.m. to take over as attending  Labs and vital stable.     At the time of my evaluation, he is sitting in chair.  Denies any chest pain or shortness of breath.  Reports feeling anxious about events.  Wanting to go home.        Chief Complaint: Chest pain        Subjective:  Robert Dyer is a 84 year old male with history of COPD, HTN, kidney stones, tobacco dependence who presented to the ED for evaluation of chest pain.  Chest pain has been intermittent throughout the day, worsened about an hour PTA.,  He took 162 mg aspirin prior to arrival.  On arrival to the ED, he was bradycardic.  Given morphine and atropine  Initial EKG showed diffuse ST elevation to leads III and aVF with concerns for inferior MI.  Cardiac alert called.  He was started on heparin drip and taken to Cath Lab.     Troponin 1648, potassium 3.3.  Otherwise stable  CXR: Advanced emphysema     He underwent cardiac catheterization with noted 100% acute  thrombotic occlusion of mid RCA.  During cath he had episodes of ventricular fibrillation.  CODE BLUE called.  He was also noted with severe PAD with occluded right common iliac artery.  He had PCI to proximal and mid RCA.  Also had DCCV x 3.     During episodes of V. tach, he remained awake.  Did not require intubation.  Hemodynamics stabilized.  Post cath, he was admitted to the ICU under cardiology.  Hospitalist consulted this a.m. to take over as attending  Labs and vital stable.     At the time of my evaluation, he is sitting in chair.  Denies any chest pain or shortness of breath.  Reports feeling anxious about events.  Wanting to go home.        Assessment & Plan:  STEMI with 100% RCA occlusion status post PCI  Ventricular fibrillation s/p DCCV  Bradycardia likely due to STEMI s/p atropine.  Resolved  -Continue ICU  -Amiodarone drip per cardiology  -Aspirin, Brilinta, statin  -Critical care following     Hypokalemia  -Replaced per protocol     Hyperglycemia  -Check A1c     HLD  -Statin     PAD, noted on cardiac cath  -Aspirin, statin     COPD, currently not in exacerbation  Tobacco dependence  -Smoking cessation encouraged     HTN  -Meds as appropriate     1/6 CARDIOLOGY NOTE    Assessment:     1.  CAD.  Presentation with inferior wall MI last evening.  Cath revealed total occlusion of the proximal RCA and mild to moderate disease in the LAD.  Status post PCI of RCA with excellent angiographic results and good flow.  Has remained pain-free since then.  Stable blood pressure and rhythm.     On DAPT, statin.  No beta-blocker at this time due to resting bradycardia.        Plan:     Continue current medical therapy.     Ambulate in room.  If stable, could transfer to telemetry later today.     Echocardiogram today.     Discontinue amiodarone drip.        Subjective:  No chest pain or shortness of breath.     Objective:  /63 (BP Location: Right arm)   Pulse 56   Temp 98 °F (36.7 °C) (Temporal)   Resp 14    Ht 172.7 cm (5' 8\")   Wt 143 lb 8.3 oz (65.1 kg)   SpO2 95%   BMI 21.82 kg/m²      Temp (24hrs), Av.9 °F (36.6 °C), Min:97.5 °F (36.4 °C), Max:98 °F (36.7 °C)        Intake/Output:     Intake/Output Summary (Last 24 hours) at 2025 0911  Last data filed at 2025 0552      Gross per 24 hour   Intake 1274 ml   Output 500 ml   Net 774 ml          Physical Exam:    General: Alert and oriented x 3,  No apparent distress.  HEENT: No focal deficits.  Neck: No JVD, carotids 2+ no bruits.  Cardiac: Regular rate and rhythm, S1, S2 normal, no murmur  Lungs: Clear without wheezes, rales, rhonchi.  Normal excursions and effort.  Abdomen: Soft, non-tender.   Extremities: Without clubbing, cyanosis or edema.  Peripheral pulses are 2+.  Both groin sites soft, nontender.  Skin: Warm and dry.      Labs:        Lab Results   Component Value Date     WBC 10.9 2025     HGB 15.0 2025     HCT 42.6 2025     .0 2025            Lab Results   Component Value Date     INR 0.96 2025            Lab Results   Component Value Date      2025     K 4.2 2025      2025     CO2 24.0 2025     BUN 13 2025     CREATSERUM 1.04 2025      2025     CA 9.5 2025  ICU NOTE  Called to bedside due to urinary retention, nursing unable to straight cath patient.   Pt denies prior urinary problems.   Lidocaine gel, 12f coude catheter with 10 ml balloon inserted without difficulty using aseptic technique with dark old appearing blood returned, no clots noted.  Pt tolerated procedure well.  Labs: CBC and BMP now  Urology consulted via perfect serve to Dr Wright- message received and will see patient on 2025.      UROLOGY CONSULT NOTE    History of Present Illness:   Patient is a 84 year old male with PMHx of emphysema, HTN, nephrolithiasis, and tobacco dependence wo presented to the ER on  with c/o chest pain. The patient was noted  to be in complete heart block with STEMI and was taken directly to the cath lab. Upon discharge from the cath lab, the patient was admitted to the ICU. The patient was noted to be in urinary retention, requiring straight cath x 1. A harris was then placed last night due to continue retention and 1L was drained from the bladder. The urine was noted to be maroon and hazy. The patient denies history of urinary problems. States he drinks a lot of coffee so he urinates a lot. Has a 213 total year pack history of tobacco use. He was started on Brilinta this admission.     The patient is currently resting in bed. Afebrile, VSS. States he feels well. Harris is draining maroon urine without clots. No c/o chest pain or difficulty breathing.     Cr 0.98  WBC 10.9 -> 14.6 -> 11.7  Hgb 14.0     UA and Culture ordered but not sent yet       Impression:      Patient is a 84 year old male admitted with complete cardiac block and NSTEMI, noted to be in urinary retention. No  history aside from nephrolithiasis. Cr normal. WBC slightly elevated. UA and culture ordered. Urine noted to be hazy maroon. Patient has 213 year pack history of tobacco use.     Recommendations:  - Antibiotics per IM. Awaiting urine cultures  - Start Tamsulosin 0.4 mg and Finasteride 5 mg every day  - Maintain harris x 10-14 days  - CT Urogram and urine cytology for gross hematuria  - Recommend outpatient cystoscopy to complete gross hematuria workup      1/7 CARDIOLOGY NOTE    Interval Note:  Patient feels well, no cardiac complaints-denies chest pain or shortness of breath, ambulating without symptoms  Has Harris catheter in at this time, evaluate urology for urinary retention-urinalysis being sent off, starting on tamsulosin and finasteride and planning CT urogram for hematuria later today         Assessment   Inferior STEMI status post RCA PCI X3  VF  due to STEMI required DCCV X3  Transient heart block due to STEMI, resolved  Hematuria  Urinary obstruction  status post Harris  Tobacco abuse history  Hypertension        Plan  Telemetry reviewed, maintain normal sinus rhythm with no blocks or bradycardia; start low-dose beta-blocker Toprol-XL 25 mg daily  Continue aspirin, Brilinta, statin  Medical management of ramus lesion  Cardiac rehab as outpatient  Appreciate urology recommendations, will await urinalysis/culture and CT-started on finasteride and tamsulosin    1/7 HOSPITALIST NOTE       Vital signs:  Temp:  [96.5 °F (35.8 °C)-98.1 °F (36.7 °C)] 97.7 °F (36.5 °C)  Pulse:  [50-98] 65  Resp:  [14-28] 14  BP: (110-162)/(55-93) 125/76  SpO2:  [88 %-96 %] 91 %        Physical Exam:    Gen: NAD AO x3  Chest: good air entry CTABL  CVS: normal s1 and s2 RR  Abd: NABS soft NT ND  Neuro: CN 2-12 grossly intact  Ext: no edema in bilateral LE        Diagnostic Data:    Labs:         Recent Labs   Lab 01/05/25 2019 01/06/25 0453 01/06/25 1848 01/07/25  0428   WBC 11.2* 10.9 14.6* 11.7*   HGB 16.2 15.0 14.2 14.0   MCV 91.4 92.6 90.0 90.7   .0 165.0 181.0 165.0   INR 0.96  --   --   --                Recent Labs   Lab 01/06/25 0453 01/06/25 1848 01/07/25  0428   * 128* 106*   BUN 13 11 11   CREATSERUM 1.04 0.96 0.98   CA 9.5 9.3 8.9   ALB  --   --  3.7  3.7    137 142   K 4.2 3.7 4.1  4.1    108 110   CO2 24.0 20.0* 23.0   ALKPHO  --   --  81   AST  --   --  85*   ALT  --   --  56*   BILT  --   --  0.9   TP  --   --  6.1          Assessment & Plan:  Inferior STEMI s/p PCI x3  Vfib s/p DCCV x3  Transient heart block 2/2 STEMI - resolved  HTN  Cardiology on consult  Toprol XL 25 mg daily  ASA, Brilinta, Statin  Cardiac rehab as opt  Close opt follow up  Urinary obstruction/retention  Hematuria  Urology on consult  Continue abx  Follow cultures  Continue tamsulosin and finasteride  Maintain harris  CT urogram pending  Opt follow up with cystoscopy  Monitor H&H  Transfuse as indicated  HTN  BP well controlled  Cardiology on consult  Appreciate jessika                        [1] (Not in a hospital admission)

## 2025-01-07 NOTE — PROCEDURES
Called to bedside due to urinary retention, nursing unable to straight cath patient.   Pt denies prior urinary problems.   Lidocaine gel, 12f coude catheter with 10 ml balloon inserted without difficulty using aseptic technique with dark old appearing blood returned, no clots noted.  Pt tolerated procedure well.  Labs: CBC and BMP now  Urology consulted via perfect serve to Dr Wright- message received and will see patient on 1/7/2025.    BRISEIDA CHRISTIAN APRN/CNP  Acute Care Nurse Practitioner  1/6/2025

## 2025-01-07 NOTE — PROGRESS NOTES
[facility]  part of [facility]    Progress Note    Robert Dyer Patient Status:  Inpatient    1940 MRN [id number]   Location [facility] 2W/SW Attending Phoebe Pineda MD   Hosp Day # 3 PCP Hadley Marcus,         Subjective:   The patient is currently resting in bed. Afebrile, VSS. States he feels well. Harris is draining light vashti urine without clots. Required irrigation by RN yesterday afternoon and once overnight for leaking around harris. Per RN, some tiny clots noted during overnight irrigation. No c/o chest pain or difficulty breathing.      Cr 1.09  WBC 10.9 -> 14.6 -> 11.7 -> 10.9  Hgb 14.2     UA showed trace ketones, 3+ blood, 100 protein, 75 leukocytes, 21-50 WBC, >10 RBC, rare bacteria, and few squamous cells.  Urine culture pending  Urine cytology negative    CT Urogram showed subcentimeter nonobstructing left nephrolithiasis. Suboptimal evaluation the urinary bladder is the bladder is partially collapsed around a Harris catheter.  Intraluminal air within the urinary bladder is probably iatrogenic.  Correlation with urinalysis can be performed if there is concern for cystitis.  Hyperdensity within the bladder also noted which could reflect hemorrhage/blood products. Question subtle pericholecystic fat stranding without radiopaque gallstone.  Mild-to-moderate diffuse atherosclerosis abdominal aorta.  Short segment occlusion right proximal common iliac artery with distal reconstitution.    1.9 cm nodular area of enhancement 3rd portion duodenum concerning for an underlying duodenal lesion.  Advise correlation with upper endoscopy.    Nondilated fluid-filled small bowel and colon which can suggest an enterocolitis. Prostatomegaly. Emphysema.         Objective:   Vital Signs:  Blood pressure 103/63, pulse 55, temperature 98 °F (36.7 °C), temperature source Temporal, resp. rate 14, height 5' 8\" (1.727 m), weight 133 lb 14.4 oz (60.7 kg), SpO2 94%.     General: Alert  and oriented. No acute distress.  Eyes: Sclera non-icteric.  Neck: No obvious masses or goiter.  CV/Resp: nonlabored breathing  Abdomen: Soft, NTND  : Mercado draining light vashti urine  Extremities: warm, well perfused, no clubbing cyanosis or edema          Results:     Lab Results   Component Value Date    WBC 10.9 01/08/2025    HGB 14.2 01/08/2025    HCT 39.9 01/08/2025    .0 01/08/2025    CREATSERUM 1.09 01/08/2025    BUN 12 01/08/2025     01/08/2025    K 3.9 01/08/2025     01/08/2025    CO2 25.0 01/08/2025    GLU 93 01/08/2025    CA 8.8 01/08/2025    ALB 3.7 01/08/2025    ALKPHO 82 01/08/2025    BILT 0.7 01/08/2025    TP 6.1 01/08/2025    AST 55 (H) 01/08/2025    ALT 41 01/08/2025    PTT 30.9 01/06/2025    INR 0.96 01/05/2025    LIP 26 01/07/2025    MG 1.9 01/07/2025    PHOS 2.6 01/07/2025       CT UROGRAM(W+WO) W/3D(CPT=74178/29746)    Result Date: 1/7/2025  CONCLUSION:   Subcentimeter nonobstructing left nephrolithiasis.  Suboptimal evaluation the urinary bladder is the bladder is partially collapsed around a Mercado catheter.  Intraluminal air within the urinary bladder is probably iatrogenic.  Correlation with urinalysis can be performed if there is concern for cystitis.  Hyperdensity within the bladder also noted which could reflect hemorrhage/blood products.  Follow-up direct visualization of the urinary bladder should be considered.  Question subtle pericholecystic fat stranding without radiopaque gallstone.  Advise follow-up ultrasound gallbladder.  Mild-to-moderate diffuse atherosclerosis abdominal aorta.  Short segment occlusion right proximal common iliac artery with distal reconstitution.  1.9 cm nodular area of enhancement 3rd portion duodenum concerning for an underlying duodenal lesion.  Advise correlation with upper endoscopy.  Nondilated fluid-filled small bowel and colon which can suggest an enterocolitis.  Prostatomegaly.  Emphysema.  Lesser incidental findings as above.        Dictated by (CST): Yuniel Saldivar MD on 1/07/2025 at 12:45 PM     Finalized by (CST): Yuniel Saldivar MD on 1/07/2025 at 1:01 PM                         Assessment and Plan:     Pt is a 84 year old male admitted with complete cardiac block and NSTEMI, noted to be in urinary retention. No  history aside from nephrolithiasis. Cr normal. WBC slightly elevated. UA and culture ordered. Urine noted to be hazy maroon. Patient has 213 year pack history of tobacco use.    Irrigated harris with 120mL NaCl. Light vashti urine returned. No clots appreciated.     Recommendations:  - Antibiotics per IM. Awaiting urine cultures  - Tamsulosin 0.4 mg and Finasteride 5 mg every day  - Maintain harris x 10-14 days  - Recommend outpatient cystoscopy to complete gross hematuria workup        All questions answered. Patient verbalizes understanding and agrees with plan. Okay for discharge from  standpoint with outpatient follow-up.    ROXI Vargas  Urology Department  [facility]  Office: [phone]

## 2025-01-07 NOTE — PROGRESS NOTES
Cardiology Progress Note    Robert Dyer Patient Status:  Inpatient    1940 MRN [id number]   Location [facility] 2W/SW Attending Phoebe Pineda MD   Hosp Day # 2 PCP Hadley Marcus, DO     Interval Note:  Patient feels well, no cardiac complaints-denies chest pain or shortness of breath, ambulating without symptoms  Has Mercado catheter in at this time, evaluate urology for urinary retention-urinalysis being sent off, starting on tamsulosin and finasteride and planning CT urogram for hematuria later today      --------------------------------------------------------------------------------------------------------------------------------  ROS 12 systems reviewed, pertinent findings above.  ROS    History:  Past Medical History:    Allergic rhinitis    Centrilobular emphysema (HCC)    Essential hypertension    History of nephrolithiasis    Tobacco dependence    1 PPD at 14yoa, and 3 PPD since 17yoa.      No past surgical history on file.  Family History   Problem Relation Age of Onset    Cancer Father       reports that he has been smoking cigarettes. He started smoking about 71 years ago. He has a 213 pack-year smoking history. He has never used smokeless tobacco. He reports that he does not currently use alcohol. He reports that he does not use drugs.    Objective:   Temp: 97.7 °F (36.5 °C)  Pulse: 98  Resp: 20  BP: 127/93    Intake/Output:     Intake/Output Summary (Last 24 hours) at 2025 0958  Last data filed at 2025 0900  Gross per 24 hour   Intake 860 ml   Output 1585 ml   Net -725 ml       Physical Exam:    General: Alert and oriented x 3, no acute distress  HEENT: Normocephalic, anicteric sclera, neck supple.  Neck: No JVD, carotids 2+, no bruits.  Cardiac: Regular rate and rhythm. S1, S2 normal. No murmur, pericardial rub, S3.  Lungs: Clear without wheezes, rales, rhonchi or dullness.  Normal excursions and effort.  Abdomen: Soft, non-tender. BS-present.  Extremities: Without  clubbing, cyanosis or edema.  Peripheral pulses are 2+.  Neurologic: Non-focal  Skin: Warm and dry.       Assessment   Inferior STEMI status post RCA PCI X3  VF  due to STEMI required DCCV X3  Transient heart block due to STEMI, resolved  Hematuria  Urinary obstruction status post Mercado  Tobacco abuse history  Hypertension      Plan  Telemetry reviewed, maintain normal sinus rhythm with no blocks or bradycardia; start low-dose beta-blocker Toprol-XL 25 mg daily  Continue aspirin, Brilinta, statin  Medical management of ramus lesion  Cardiac rehab as outpatient  Appreciate urology recommendations, will await urinalysis/culture and CT-started on finasteride and tamsulosin  Stable from cardiac standpoint for discharge  this afternoon with close follow-up in cardiology clinic    Thank you for allowing me to take part in the care of Robert Dyer. Please call with any questions of concerns.      Level of care: L3    Latrice Barr DO  [facility]   Interventional Cardiac and Vascular Services      Latrice Barr DO  January 07, 2025  9:58 AM

## 2025-01-07 NOTE — OCCUPATIONAL THERAPY NOTE
OCCUPATIONAL THERAPY EVALUATION - INPATIENT     Room Number: 237/237-A  Evaluation Date: 2025  Type of Evaluation: Initial  Presenting Problem: MI    Physician Order: IP Consult to Occupational Therapy  Reason for Therapy: ADL/IADL Dysfunction and Discharge Planning    OCCUPATIONAL THERAPY ASSESSMENT   Patient is a 84 year old male admitted 2025 for STEMI.  Prior to admission, patient was living w/ his wife in a first floor apartment, no stairs to enter. Pt reports being independent w/ adls, ambulation w/o an ad and driving. Pt owns a restaurant.  Patient is currently functioning near baseline with adls and functional mobility. Anticipate pt will be able to return home w/ support of family.    PLAN  Patient has been evaluated and presents with no skilled Occupational Therapy needs  at this time.  Patient will be discharged from Occupational Therapy services.     OT Device Recommendations: None    OCCUPATIONAL THERAPY MEDICAL/SOCIAL HISTORY   Problem List  Principal Problem:    ST elevation myocardial infarction (STEMI) involving other coronary artery of inferior wall (HCC)    HOME SITUATION  Type of Home: Apartment  Home Layout: One level  Lives With: Spouse  Occupation/Status: -- (pt owns a resturant)  Drives: Yes (runs restaurant)  Patient Regularly Uses: Glasses    Stairs in Home: none  Use of Assistive Device(s): none    Prior Level of [place]: Prior to admission, patient was living w/ his wife in a first floor apartment, no stairs to enter. Pt reports being independent w/ adls, ambulation w/o an ad and driving. Pt owns a restaurant.    SUBJECTIVE  \"I was hoping to go home today\"    OCCUPATIONAL THERAPY EXAMINATION    OBJECTIVE  Precautions: Cardiac; Hard of hearing  Fall Risk: Standard fall risk    PAIN ASSESSMENT  Ratin    ACTIVITY TOLERANCE  good    O2 SATURATIONS  Activity on room air    Behavioral/Emotional/Social: cooperative    RANGE OF MOTION   Upper extremity ROM is within functional  limits     STRENGTH ASSESSMENT  Upper extremity strength is within functional limits     ACTIVITIES OF DAILY LIVING ASSESSMENT  AM-PAC ‘6-Clicks’ Inpatient Daily Activity Short Form  How much help from another person does the patient currently need…  -   Putting on and taking off regular lower body clothing?: A Little  -   Bathing (including washing, rinsing, drying)?: A Little  -   Toileting, which includes using toilet, bedpan or urinal? : None  -   Putting on and taking off regular upper body clothing?: A Little  -   Taking care of personal grooming such as brushing teeth?: None  -   Eating meals?: None    AM-PAC Score:  Score: 21  Approx Degree of Impairment: 32.79%  Standardized Score (AM-PAC Scale): 44.27  CMS Modifier (G-Code): CJ    FUNCTIONAL ADL ASSESSMENT  Eating: independent  Grooming: independent  UB Dressing: min assist  LB Dressing: min assist  Toileting: na    Skilled Therapy Provided: OT orders received and chart reviewed. RN contacted prior to start of care. Treatment coordinated w/ PT. Pt agreeable to participation in therapy. Pt received received in bed, alert and oriented x 4;family at bedside. On initial contact pt performing bed mobility and transfers w/ supervision. Pt ambulating in the mcdonald w/o an ad and supervision;no lob or instability noted. Pt currently unable  to identify potential concerns in anticipation of. Energy conservation and pacing strategies discussed. At this time pt does not present w/ unmet skilled OT needs. Anticipate pt will be able to return home w/ support of family.    At end of session pt left sitting eob w/ all needs in reach;family at bedside. RN aware of pt's status and performance in therapy. No further OT contact planned      EDUCATION PROVIDED  Patient Education : Role of Occupational Therapy; Plan of Care; Functional Transfer Techniques; Fall Prevention; Energy Conservation  Patient's Response to Education: Verbalized Understanding  Family/Caregiver's Response  to Education: Verbalized Understanding    The patient's Approx Degree of Impairment: 32.79% has been calculated based on documentation in the [facility] '6 clicks' Inpatient Daily Activity Short Form.  Research supports that patients with this level of impairment may benefit from return to home.  Final disposition will be made by interdisciplinary medical team.    Patient End of Session: Needs met;Call light within reach;RN aware of session/findings;All patient questions and concerns addressed;Family present (sitting eob)    Patient Evaluation Complexity Level:   Occupational Profile/Medical History LOW - Brief history including review of medical or therapy records    Specific performance deficits impacting engagement in ADL/IADL LOW  1 - 3 performance deficits    Client Assessment/Performance Deficits LOW - No comorbidities nor modifications of tasks    Clinical Decision Making LOW - Analysis of occupational profile, problem-focused assessments, limited treatment options    Overall Complexity LOW     Therapeutic Activity: 15 minutes

## 2025-01-07 NOTE — TELEPHONE ENCOUNTER
Can we get this patient set up for a nurse visit for a voiding trial in 10-14 days? He also needs to be set up with a cystoscopy for gross hematuria with one of the physicians. He is currently admitted. Thanks!

## 2025-01-07 NOTE — PHYSICAL THERAPY NOTE
PHYSICAL THERAPY EVALUATION - INPATIENT     Room Number: 237/237-A  Evaluation Date: 1/7/2025  Type of Evaluation: Initial   Physician Order: PT Eval and Treat    Presenting Problem: STEMI S/P LHC, PCI 1/5/25  Co-Morbidities : COPD, HTN, kidney stones, tobacco dependence  Reason for Therapy: Mobility Dysfunction and Discharge Planning    PHYSICAL THERAPY ASSESSMENT   Patient is a 84 year old male admitted 1/5/2025 for STEMI S/P LHC, PCI.  Prior to admission, patient's baseline is independent without a device, owns a local restaurant.  Patient is currently functioning at baseline with bed mobility, transfers, and gait.    Patient demonstrates independence without a device. Patient verbalizes no further mobility concerns at this time, is functioning safely with mobility to D/C at this level of care. Physical Therapy to sign off at this time, please re-consult if patient experiences a decline in functional status. Anticipate patient to return home with no skilled therapy needs.    PLAN  Patient has been evaluated and presents with no skilled Physical Therapy needs at this time.  Patient will be discharged from Physical Therapy services. Please re-order if a new functional limitation presents during this admission.    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.\"    PHYSICAL THERAPY EXAMINATION   OBJECTIVE  Precautions: Cardiac;Hard of hearing  Fall Risk: Standard fall risk    WEIGHT BEARING  RESTRICTION  none    PAIN ASSESSMENT  Ratin  Location: denies       COGNITION  Overall Cognitive Status:  WFL - within functional limits    RANGE OF MOTION AND STRENGTH ASSESSMENT  Upper extremity ROM and strength are within functional limits  BUEs  Lower extremity ROM is within functional limits  BLEs  Lower extremity strength is within functional limits  BLEs    BALANCE  Static Sitting: Normal  Dynamic Sitting: Normal  Static Standing: Normal  Dynamic Standing: Normal    ACTIVITY TOLERANCE  Pulse: 66 (at rest, 70s with activity)                      O2 WALK  Oxygen Therapy  SPO2% on Room Air at Rest: 94  SPO2% Ambulation on Room Air: 92    AM-PAC '6-Clicks' INPATIENT SHORT FORM - BASIC MOBILITY  How much difficulty does the patient currently have...  Patient Difficulty: Turning over in bed (including adjusting bedclothes, sheets and blankets)?: None   Patient Difficulty: Sitting down on and standing up from a chair with arms (e.g., wheelchair, bedside commode, etc.): None   Patient Difficulty: Moving from lying on back to sitting on the side of the bed?: None   How much help from another person does the patient currently need...   Help from Another: Moving to and from a bed to a chair (including a wheelchair)?: None   Help from Another: Need to walk in hospital room?: None   Help from Another: Climbing 3-5 steps with a railing?: A Little     AM-PAC Score:  Raw Score: 23   Approx Degree of Impairment: 11.2%   Standardized Score (AM-PAC Scale): 56.93   CMS Modifier (G-Code): CI    FUNCTIONAL ABILITY STATUS  Functional Mobility/Gait Assessment  Gait Assistance: Supervision;Independent  Distance (ft): 200  Assistive Device: None  Pattern: Within Functional Limits  Rolling: independent  Supine to Sit: independent  Sit to Supine: independent  Sit to Stand: independent    Exercise/Education Provided:  Education Provided To: Patient  Patient Education: Role of Physical Therapy;Plan of Care;Functional Transfer  Techniques;Fall Prevention;Proper Body Mechanics;Energy Conservation;Gait Training  Patient's Response to Education: Verbalized Understanding;Returned Demonstration          Skilled Therapy Provided: Pt tolerating household and community distances without a device, verbalizes confidence in ability to function at home and work.    Patient seen for evaluation in coordination with occupational therapy for ADL assessment/education. Patient received supine in bed, agreeable to physical therapy evaluation. Vital signs monitored as noted above, no adverse symptoms and patient stable during session.     Patient history and/or personal factors that may impact the plan of care include co-morbidities (COPD, HTN, kidney stones, tobacco dependence) affecting medical status . Based on the physical therapy examination of the noted systems and functional activity/participation limitations, the patient presentation is stable given the patient demonstrates no significant barriers to meeting therapy goals and is functioning near baseline level.    The patient's Approx Degree of Impairment: 11.2% has been calculated based on documentation in the [facility] '6 clicks' Inpatient Basic Mobility Short Form.  Research supports that patients with this level of impairment may benefit from no services.  Final disposition will be made by interdisciplinary medical team.    Patient End of Session: In bed;Needs met;Call light within reach;RN aware of session/findings;All patient questions and concerns addressed;Hospital anti-slip socks;Family present    Patient was able to achieve the following ...   Patient able to transfer  At previous, functional level  Safely and independently    Patient able to ambulate on level surfaces  At previous, functional level  Safely and independently     Patient Evaluation Complexity Level:  History Moderate - 1 or 2 personal factors and/or co-morbidities   Examination of body systems Low -  addressing 1-2 elements    Clinical Presentation Low- Stable   Clinical Decision Making  Low Complexity       Therapeutic Activity:  12 minutes      Elise Mark, PT, DPT  [facility]  Rehab Services - Physical Therapy  [id number]

## 2025-01-07 NOTE — PROGRESS NOTES
[facility]  part of [facility]     Hospitalist Progress Note     Robert Dyer Patient Status:  Inpatient    1940 MRN [id number]   Location [facility] 2W/SW Attending Phoebe Pineda MD   Hosp Day # 2 PCP Hadley Marcus DO     Subjective:     Patient resting comfortably in bed and in NAD.  In good spirits. Looking forward to discharging in a.m.      Objective:    Review of Systems:   ROS completed; pertinent positive and negatives stated in subjective.      Vital signs:  Temp:  [96.5 °F (35.8 °C)-98.1 °F (36.7 °C)] 97.7 °F (36.5 °C)  Pulse:  [50-98] 65  Resp:  [14-28] 14  BP: (110-162)/(55-93) 125/76  SpO2:  [88 %-96 %] 91 %      Physical Exam:    Gen: NAD AO x3  Chest: good air entry CTABL  CVS: normal s1 and s2 RR  Abd: NABS soft NT ND  Neuro: CN 2-12 grossly intact  Ext: no edema in bilateral LE      Diagnostic Data:    Labs:  Recent Labs   Lab 25  0428   WBC 11.2* 10.9 14.6* 11.7*   HGB 16.2 15.0 14.2 14.0   MCV 91.4 92.6 90.0 90.7   .0 165.0 181.0 165.0   INR 0.96  --   --   --        Recent Labs   Lab 25  0428   * 128* 106*   BUN 13 11 11   CREATSERUM 1.04 0.96 0.98   CA 9.5 9.3 8.9   ALB  --   --  3.7  3.7    137 142   K 4.2 3.7 4.1  4.1    108 110   CO2 24.0 20.0* 23.0   ALKPHO  --   --  81   AST  --   --  85*   ALT  --   --  56*   BILT  --   --  0.9   TP  --   --  6.1       Estimated Creatinine Clearance: 53.4 mL/min (based on SCr of 0.98 mg/dL).    Recent Labs   Lab 25   PTP 13.4   INR 0.96              Imaging: Imaging data reviewed in Epic.    Medications:    tamsulosin  0.4 mg Oral Daily    finasteride  5 mg Oral Daily    metoprolol succinate ER  25 mg Oral Daily Beta Blocker    aspirin  150 mg Rectal Once    ticagrelor  90 mg Oral Q12H    aspirin  81 mg Oral Daily    rosuvastatin  40 mg Oral Nightly       Assessment & Plan:     Inferior  STEMI s/p PCI x3  Vfib s/p DCCV x3  Transient heart block 2/2 STEMI - resolved  HTN  Cardiology on consult  Toprol XL 25 mg daily  ASA, Brilinta, Statin  Cardiac rehab as opt  Close opt follow up  Urinary obstruction/retention  Hematuria  Urology on consult  Continue abx  Follow cultures  Continue tamsulosin and finasteride  Maintain harris  CT urogram pending  Opt follow up with cystoscopy  Monitor H&H  Transfuse as indicated  HTN  BP well controlled  Cardiology on consult  Appreciate recs      Plan of care discussed with patient or family at bedside.      Supplementary Documentation:     Quality:  DVT Prophylaxis: Ticagrelor      Estimated date of discharge: TBD  Discharge is dependent on: clinical stability  At this point Mr. Dyer is expected to be discharge to: home             **Certification      PHYSICIAN Certification of Need for Inpatient Hospitalization - Initial Certification    Patient will require inpatient services that will reasonably be expected to span two midnight's based on the clinical documentation in H+P.   Based on patients current state of illness, I anticipate that, after discharge, patient will require TBD.      Phoebe Pineda MD  Hospitalist    MDM: High, I personally reviewed the available laboratories, imaging. I discussed the case with RN. I ordered laboratories, studies including AM labs.  Medical decision making high, risk is high.       The 21st Century Cures Act makes medical notes like these available to patients in the interest of transparency. Please be advised this is a medical document. Medical documents are intended to carry relevant information, facts as evident, and the clinical opinion of the practitioner. The medical note is intended as peer to peer communication and may appear blunt or direct. It is written in medical language and may contain abbreviations or verbiage that are unfamiliar.

## 2025-01-07 NOTE — PLAN OF CARE
Problem: CARDIOVASCULAR - ADULT  Goal: Absence of cardiac arrhythmias or at baseline  Description: INTERVENTIONS:  - Continuous cardiac monitoring, monitor vital signs, obtain 12 lead EKG if indicated  - Evaluate effectiveness of antiarrhythmic and heart rate control medications as ordered  - Initiate emergency measures for life threatening arrhythmias  - Monitor electrolytes and administer replacement therapy as ordered  Outcome: Progressing     Problem: PAIN - ADULT  Goal: Verbalizes/displays adequate comfort level or patient's stated pain goal  Description: INTERVENTIONS:  - Encourage pt to monitor pain and request assistance  - Assess pain using appropriate pain scale  - Administer analgesics based on type and severity of pain and evaluate response  - Implement non-pharmacological measures as appropriate and evaluate response  - Consider cultural and social influences on pain and pain management  - Manage/alleviate anxiety  - Utilize distraction and/or relaxation techniques  - Monitor for opioid side effects  - Notify MD/LIP if interventions unsuccessful or patient reports new pain  - Anticipate increased pain with activity and pre-medicate as appropriate  Outcome: Progressing

## 2025-01-07 NOTE — CONSULTS
[facility]  part of [facility]    Urology Consult Note    History of Present Illness:   Patient is a 84 year old male with PMHx of emphysema, HTN, nephrolithiasis, and tobacco dependence wo presented to the ER on 01/05/225 with c/o chest pain. The patient was noted to be in complete heart block with STEMI and was taken directly to the cath lab. Upon discharge from the cath lab, the patient was admitted to the ICU. The patient was noted to be in urinary retention, requiring straight cath x 1. A harris was then placed last night due to continue retention and 1L was drained from the bladder. The urine was noted to be maroon and hazy. The patient denies history of urinary problems. States he drinks a lot of coffee so he urinates a lot. Has a 213 total year pack history of tobacco use. He was started on Brilinta this admission.    The patient is currently resting in bed. Afebrile, VSS. States he feels well. Harris is draining maroon urine without clots. No c/o chest pain or difficulty breathing.    Cr 0.98  WBC 10.9 -> 14.6 -> 11.7  Hgb 14.0    UA and Culture ordered but not sent yet    HISTORY:  Past Medical History:    Allergic rhinitis    Centrilobular emphysema (HCC)    Essential hypertension    History of nephrolithiasis    Tobacco dependence    1 PPD at 14yoa, and 3 PPD since 17yoa.       No past surgical history on file.   Family History   Problem Relation Age of Onset    Cancer Father       Social History:   Social History     Socioeconomic History    Marital status:    Tobacco Use    Smoking status: Every Day     Current packs/day: 3.00     Average packs/day: 3.0 packs/day for 71.0 years (213.0 ttl pk-yrs)     Types: Cigarettes     Start date: 1954    Smokeless tobacco: Never    Tobacco comments:     3 PPD smoker since 17yoa.   Vaping Use    Vaping status: Never Used   Substance and Sexual Activity    Alcohol use: Not Currently    Drug use: Never    Sexual activity: Not Currently      Social Drivers of Health     Financial Resource Strain: Low Risk  (6/9/2023)    Financial Resource Strain     Difficulty of Paying Living Expenses: Not very hard     Med Affordability: No   Food Insecurity: No Food Insecurity (1/5/2025)    Food Insecurity     Food Insecurity: Never true   Transportation Needs: No Transportation Needs (1/5/2025)    Transportation Needs     Lack of Transportation: No   Housing Stability: Low Risk  (1/5/2025)    Housing Stability     Housing Instability: No        Allergies  Allergies[1]    Review of Systems:   A 10-point review of systems was completed and is negative other than as noted above.    Physical Exam:   /78 (BP Location: Right arm)   Pulse 60   Temp 97.5 °F (36.4 °C) (Temporal)   Resp 15   Ht 5' 8\" (1.727 m)   Wt 143 lb 4.8 oz (65 kg)   SpO2 93%   BMI 21.79 kg/m²     GENERAL APPEARANCE: well developed, well nourished, in no acute distress  NEUROLOGIC: no localizing neurologic signs, alert and oriented x 3, converses appropriately  HEAD: atraumatic, normocephalic  EYES: sclera non-icteric  ORAL CAVITY: mucosa moist  NECK/THYROID: no obvious masses or goiter  LUNGS: non-labored breathing  ABDOMEN: soft, nontender, nondistended  GENITOURINARY: Mercado draining hazy maroon urine without clots  EXTREMITIES: warm, well-perfused. No clubbing, cyanosis or edema.  SKIN: no obvious rashes    Results:     Laboratory Data:  Lab Results   Component Value Date    WBC 11.7 (H) 01/07/2025    HGB 14.0 01/07/2025    .0 01/07/2025     Lab Results   Component Value Date     01/07/2025    K 4.1 01/07/2025    K 4.1 01/07/2025     01/07/2025    CO2 23.0 01/07/2025    BUN 11 01/07/2025     (H) 01/07/2025    GFRAA 74 02/24/2020    AST 85 (H) 01/07/2025    ALT 56 (H) 01/07/2025    TP 6.1 01/07/2025    ALB 3.7 01/07/2025    ALB 3.7 01/07/2025    PHOS 2.6 01/07/2025    CA 8.9 01/07/2025    MG 1.9 01/07/2025       Urinalysis Results (last three years):  Recent  Labs     23  1105   COLORUR Yellow   CLARITY Clear   SPECGRAVITY 1.027   PHURINE 5.5   PROUR 30*   GLUUR Normal   KETUR 10*   BILUR Negative   BLOODURINE Negative   NITRITE Negative   UROBILINOGEN Normal   LEUUR Negative   WBCUR 1-5   RBCUR 0-2   BACUR Rare*       Urine Culture Results (last three years):  No results found for: \"URINECUL\"    Imaging  CARD ECHO 2D DOPPLER CONTRAST (CPT=93306)    Result Date: 2025  Transthoracic Echocardiogram Name:Robert Dyer Date: 2025 :  1940 Ht:  (68in)  BP: 132 / 98 MRN:  [id number]    Age:  84years    Wt:  (143lb) HR: 48bpm Loc:  [facility]       Gndr: M          BSA: 1.77m^2 Sonographer: KIMBERLEY Davis Ordering:    Latrice Barr Consulting:  Reina Bowman ---------------------------------------------------------------------------- History/Indications:   Chest pain.  PMH:   Myocardial infarction.  Risk factors:  Hypertension.  PTCA.    Performed during the current admission. ---------------------------------------------------------------------------- Procedure information:  A transthoracic complete 2D study was performed. Additional evaluation included M-mode, complete spectral Doppler, and color Doppler.  Patient status:  Inpatient.  Location:  Bedside.    No prior study was available for comparison.    This was a routine study. Transthoracic echocardiography for diagnosis, ventricular function evaluation, and post intervention evaluation. Image quality was suboptimal. Intravenous contrast (Definity) was administered to evaluate left ventricular function, enhance regional wall motion assessment, and opacify the LV. ECG rhythm:   Sinus bradycardia ---------------------------------------------------------------------------- Conclusions: 1. Left ventricle: The cavity size was normal. Wall thickness was normal.    Systolic function was normal. The estimated ejection fraction was 55-60%,    by 3D assessment. Left ventricular diastolic function parameters  were    normal. 2. Left ventricle: There is hypokinesis of the basal inferior wall. 3. Right ventricle: The cavity size was increased. Systolic function was    mildly reduced. The RV free wall longitudinal strain was -18.90%. 4. Right atrium: The atrium was mildly dilated. 5. Pulmonary arteries: Systolic pressure was within the normal range, in the    range of 25mm Hg to 30mm Hg. Impressions:  No previous study was available for comparison. * ---------------------------------------------------------------------------- * Findings: Left ventricle:  The cavity size was normal. Wall thickness was normal. Systolic function was normal. The estimated ejection fraction was 55-60%, by 3D assessment. Regional wall motion:   There is hypokinesis of the basal inferior wall. Left ventricular diastolic function parameters were normal. Left atrium:  The atrium was normal in size. Right ventricle:  The cavity size was increased. Systolic function was mildly reduced. Right atrium:  The atrium was mildly dilated. Mitral valve:  The valve was structurally normal. Leaflet separation was normal.  Doppler:  Transvalvular velocity was within the normal range. There was no evidence for stenosis. There was trivial regurgitation. Aortic valve:   The valve was trileaflet. The leaflets were mildly calcified. Cusp separation was normal.  Doppler:  Transvalvular velocity was within the normal range. There was no evidence for stenosis. There was no significant regurgitation. Tricuspid valve:  The valve is structurally normal. Leaflet separation was normal.  Doppler:  Transvalvular velocity was within the normal range. There was no evidence for stenosis. There was trivial regurgitation. Pulmonic valve:   The valve is structurally normal. Cusp separation was normal.  Doppler:  Transvalvular velocity was within the normal range. There was no evidence for stenosis. There was trivial regurgitation. Pericardium:   There was no pericardial effusion.  Aorta: Aortic root: The aortic root was normal. Pulmonary arteries: Systolic pressure was within the normal range, in the range of 25mm Hg to 30mm Hg. Systemic veins:  Central venous respirophasic diameter changes are in the normal range (>50%). Inferior vena cava: The IVC was normal-sized. ---------------------------------------------------------------------------- Measurements  Left ventricle                   Value         Ref  LV end-diastolic volume, 3D      133    ml     ---------  LV end-systolic volume, 3D       53     ml     ---------  LV ejection fraction, 3D         60     %      52 - 72  LV end-diastolic volume/bsa,     75     ml/m^2 <=79  3D  LV end-systolic volume/bsa,      30     ml/m^2 <=32  3D  LV wall mass, 3D                 134    g      ---------  LV wall mass/bsa, 3D             76     g/m^2  ---------  IVS thickness, ED, PLAX          0.8    cm     0.6 - 1.0  LV ID, ED, PLAX                  4.5    cm     4.2 - 5.8  LV ID, ES, PLAX                  3.0    cm     2.5 - 4.0  LV PW thickness, ED, PLAX        0.8    cm     0.6 - 1.0  IVS/LV PW ratio, ED, PLAX        1.00          ---------  LV PW/LV ID ratio, ED, PLAX      0.18          ---------  LV area, ED, A2C                 28.3   cm^2   ---------  LV area, ES, A2C                 14.0   cm^2   ---------  LV fx area change, A2C           51     %      ---------  LV ejection fraction             62     %      52 - 72  Stroke volume/bsa, 2D            39     ml/m^2 ---------  LV end-diastolic volume, 1-p     128    ml     69 - 185  A4C  LV ejection fraction, 1-p        64     %      46 - 74  A4C  Stroke volume, 1-p A4C           82     ml     ---------  LV end-diastolic volume/bsa,     72     ml/m^2 37 - 93  1-p A4C  Stroke volume/bsa, 1-p A4C       46     ml/m^2 ---------  LV end-diastolic volume, 2-p     94     ml     62 - 150  LV end-systolic volume, 2-p      46     ml     21 - 61  LV ejection fraction, 2-p    (L) 51     %      52 - 72   Stroke volume, 2-p               48     ml     ---------  LV end-diastolic volume/bsa,     53     ml/m^2 34 - 74  2-p  LV end-systolic volume/bsa,      26     ml/m^2 11 - 31  2-p  Stroke volume/bsa, 2-p           27.3   ml/m^2 ---------  LV e', lateral                   10.4   cm/sec >=10.0  LV E/e', lateral                 6             <=13  LV e', medial                    7.2    cm/sec >=7.0  LV E/e', medial                  9             ---------  LV e', average                   8.8    cm/sec ---------  LV E/e', average                 7             <=14  LVOT                             Value         Ref  LVOT ID                          2.2    cm     ---------  LVOT peak velocity, S            0.88   m/sec  ---------  LVOT VTI, S                      18.1   cm     ---------  LVOT peak gradient, S            3      mm Hg  ---------  LVOT mean gradient, S            2      mm Hg  ---------  Stroke volume (SV), LVOT DP      69     ml     ---------  Stroke index (SV/bsa), LVOT      39     ml/m^2 ---------  DP  Aortic root                      Value         Ref  Aortic root ID                   3.1    cm     2.5 - 4.0  Left atrium                      Value         Ref  LA ID, A-P, ES                   3.3    cm     3.0 - 4.0  LA volume, S                     52     ml     18 - 58  LA volume/bsa, S                 29     ml/m^2 16 - 34  LA volume, ES, 1-p A4C           50     ml     18 - 58  LA volume, ES, 1-p A2C           53     ml     18 - 58  LA volume, ES, A/L               55     ml     ---------  LA volume/bsa, ES, A/L           31     ml/m^2 16 - 34  LA/aortic root ratio             1.06          ---------  LA volume, ES, 3D                44     ml     18 - 58  LA volume/bsa, ES, 3D            25     ml/m^2 ---------  Mitral valve                     Value         Ref  Mitral E-wave peak velocity      0.66   m/sec  ---------  Mitral A-wave peak velocity      0.6    m/sec  ---------  Mitral E/A ratio,  peak           1.1           ---------  Pulmonary artery                 Value         Ref  PA pressure, S, DP               27     mm Hg  ---------  Tricuspid valve                  Value         Ref  Tricuspid regurg peak            2.44   m/sec  <=2.8  velocity  Tricuspid peak RV-RA             24     mm Hg  ---------  gradient  Systemic veins                   Value         Ref  Estimated CVP                    3      mm Hg  ---------  Inferior vena cava               Value         Ref  ID                               1.9    cm     <=2.1  Right ventricle                  Value         Ref  RV free wall LS, 2D              -18.90 %      ---------  TAPSE, MM                        2.13   cm     >=1.70  RV pressure, S, DP               27     mm Hg  ---------  RV s', lateral                   12.5   cm/sec >=9.5 Legend: (L)  and  (H)  nivia values outside specified reference range. ---------------------------------------------------------------------------- Prepared and electronically signed by Latrice Barr 01/06/2025 14:58     CATH LEFT HEART CATH W/INTERVENTION    Result Date: 1/5/2025  This exam has been completed. Please refer to Notes for the results to this procedure.    XR CHEST AP PORTABLE  (CPT=71045)    Result Date: 1/5/2025  PROCEDURE: XR CHEST AP PORTABLE  (CPT=71045) TIME: 2024  COMPARISON: [facility] in [place], XR CHEST PA + LAT CHEST (CPT=71046), 12/23/2024, 2:20 PM.  INDICATIONS: Chest pain and acute shortness of breath today.  TECHNIQUE:   Single view.   FINDINGS:  CARDIAC/VASC: Heart size and pulmonary vascularity normal. Atherosclerotic calcification aorta.  MEDIAST/АНДРЕЙ:   No visible mass or adenopathy. LUNGS/PLEURA: Advanced emphysema with subsegmental atelectasis and or scar in the right lower lung. No pneumothorax consolidation or pleural effusion. BONES: No fracture or visible bony lesion. OTHER: Negative.          CONCLUSION:  1. Advanced emphysema with subsegmental  atelectasis and or scar in right lower lung.  No pneumonia or other acute finding.    Dictated by (CST): Harsh Bernstein MD on 1/05/2025 at 9:00 PM     Finalized by (CST): Harsh Bernstein MD on 1/05/2025 at 9:01 PM          XR CHEST PA + LAT CHEST (CPT=71046)    Result Date: 12/23/2024  PROCEDURE: XR CHEST PA + LAT CHEST (CPT=71046)  COMPARISON: [facility], CT LD LUNG SCREENING (CPT=71271), 5/20/2024, 4:43 PM.  [facility] in [place], XR CHEST PA + LAT CHEST (CPT=71046), 4/26/2024, 2:27 PM.  INDICATIONS: Central lobular emphysema.  Cough x 2 days.  TECHNIQUE:   Two views.   FINDINGS: CARDIAC/VASC:  Normal cardiac silhouette. MEDIAST/АНДРЕЙ: Atherosclerotic aorta with no visible aneurysm.  LUNGS/PLEURA: Hyperinflation in the upper lobes with chronic crowding of lung markings bases consistent with bibasilar atelectasis/scarring.  No acute airspace consolidation. BONES: Mild scoliosis with mild degenerative disc disease and spondylosis.  OTHER: Chronic wedging midthoracic vertebra and accentuated thoracic kyphosis         CONCLUSION:  1. Emphysematous changes.  Chronic bibasilar scarring. 2. No acute airspace consolidation.    Dictated by (CST): Jamal Criag MD on 12/23/2024 at 2:54 PM     Finalized by (CST): Jamal Craig MD on 12/23/2024 at 2:58 PM              Impression:     Patient is a 84 year old male admitted with complete cardiac block and NSTEMI, noted to be in urinary retention. No  history aside from nephrolithiasis. Cr normal. WBC slightly elevated. UA and culture ordered. Urine noted to be hazy maroon. Patient has 213 year pack history of tobacco use.    Recommendations:  - Antibiotics per IM. Awaiting urine cultures  - Start Tamsulosin 0.4 mg and Finasteride 5 mg every day  - Maintain harris x 10-14 days  - CT Urogram and urine cytology for gross hematuria  - Recommend outpatient cystoscopy to complete gross hematuria workup     Thank you very much for  this consult. Please call if there are any questions or concerns.     ROXI Vargas  Urology  [facility]            [1]   Allergies  Allergen Reactions    Eggs Or Egg-Derived Products RASH

## 2025-01-08 ENCOUNTER — TELEPHONE (OUTPATIENT)
Dept: SURGERY | Facility: CLINIC | Age: 85
End: 2025-01-08

## 2025-01-08 VITALS
WEIGHT: 133.88 LBS | BODY MASS INDEX: 20.29 KG/M2 | RESPIRATION RATE: 21 BRPM | SYSTOLIC BLOOD PRESSURE: 133 MMHG | OXYGEN SATURATION: 90 % | HEART RATE: 68 BPM | HEIGHT: 68 IN | TEMPERATURE: 99 F | DIASTOLIC BLOOD PRESSURE: 74 MMHG

## 2025-01-08 PROBLEM — K31.89 DUODENAL NODULE: Status: ACTIVE | Noted: 2025-01-08

## 2025-01-08 LAB
ALBUMIN SERPL-MCNC: 3.7 G/DL (ref 3.2–4.8)
ALBUMIN/GLOB SERPL: 1.5 {RATIO} (ref 1–2)
ALP LIVER SERPL-CCNC: 82 U/L
ALT SERPL-CCNC: 41 U/L
ANION GAP SERPL CALC-SCNC: 6 MMOL/L (ref 0–18)
AST SERPL-CCNC: 55 U/L (ref ?–34)
BASOPHILS # BLD AUTO: 0.03 X10(3) UL (ref 0–0.2)
BASOPHILS NFR BLD AUTO: 0.3 %
BILIRUB SERPL-MCNC: 0.7 MG/DL (ref 0.2–1.1)
BUN BLD-MCNC: 12 MG/DL (ref 9–23)
BUN/CREAT SERPL: 11 (ref 10–20)
CALCIUM BLD-MCNC: 8.8 MG/DL (ref 8.7–10.4)
CHLORIDE SERPL-SCNC: 110 MMOL/L (ref 98–112)
CO2 SERPL-SCNC: 25 MMOL/L (ref 21–32)
CREAT BLD-MCNC: 1.09 MG/DL
DEPRECATED RDW RBC AUTO: 44.2 FL (ref 35.1–46.3)
EGFRCR SERPLBLD CKD-EPI 2021: 68 ML/MIN/1.73M2 (ref 60–?)
EOSINOPHIL # BLD AUTO: 0.25 X10(3) UL (ref 0–0.7)
EOSINOPHIL NFR BLD AUTO: 2.3 %
ERYTHROCYTE [DISTWIDTH] IN BLOOD BY AUTOMATED COUNT: 12.8 % (ref 11–15)
GLOBULIN PLAS-MCNC: 2.4 G/DL (ref 2–3.5)
GLUCOSE BLD-MCNC: 93 MG/DL (ref 70–99)
HCT VFR BLD AUTO: 39.9 %
HGB BLD-MCNC: 14.2 G/DL
IMM GRANULOCYTES # BLD AUTO: 0.05 X10(3) UL (ref 0–1)
IMM GRANULOCYTES NFR BLD: 0.5 %
LYMPHOCYTES # BLD AUTO: 1.68 X10(3) UL (ref 1–4)
LYMPHOCYTES NFR BLD AUTO: 15.4 %
MCH RBC QN AUTO: 32.9 PG (ref 26–34)
MCHC RBC AUTO-ENTMCNC: 35.6 G/DL (ref 31–37)
MCV RBC AUTO: 92.6 FL
MONOCYTES # BLD AUTO: 0.9 X10(3) UL (ref 0.1–1)
MONOCYTES NFR BLD AUTO: 8.2 %
NEUTROPHILS # BLD AUTO: 8.01 X10 (3) UL (ref 1.5–7.7)
NEUTROPHILS # BLD AUTO: 8.01 X10(3) UL (ref 1.5–7.7)
NEUTROPHILS NFR BLD AUTO: 73.3 %
OSMOLALITY SERPL CALC.SUM OF ELEC: 291 MOSM/KG (ref 275–295)
PLATELET # BLD AUTO: 163 10(3)UL (ref 150–450)
POTASSIUM SERPL-SCNC: 3.9 MMOL/L (ref 3.5–5.1)
PROT SERPL-MCNC: 6.1 G/DL (ref 5.7–8.2)
RBC # BLD AUTO: 4.31 X10(6)UL
SODIUM SERPL-SCNC: 141 MMOL/L (ref 136–145)
WBC # BLD AUTO: 10.9 X10(3) UL (ref 4–11)

## 2025-01-08 PROCEDURE — 99232 SBSQ HOSP IP/OBS MODERATE 35: CPT

## 2025-01-08 PROCEDURE — 99223 1ST HOSP IP/OBS HIGH 75: CPT | Performed by: INTERNAL MEDICINE

## 2025-01-08 PROCEDURE — 99239 HOSP IP/OBS DSCHRG MGMT >30: CPT | Performed by: INTERNAL MEDICINE

## 2025-01-08 RX ORDER — ASPIRIN 81 MG/1
81 TABLET ORAL DAILY
Qty: 30 TABLET | Refills: 0 | Status: SHIPPED | OUTPATIENT
Start: 2025-01-09

## 2025-01-08 RX ORDER — TAMSULOSIN HYDROCHLORIDE 0.4 MG/1
0.4 CAPSULE ORAL DAILY
Qty: 90 CAPSULE | Refills: 3 | Status: SHIPPED | OUTPATIENT
Start: 2025-01-08 | End: 2026-01-03

## 2025-01-08 RX ORDER — METOPROLOL SUCCINATE 25 MG/1
25 TABLET, EXTENDED RELEASE ORAL
Qty: 30 TABLET | Refills: 0 | Status: SHIPPED | OUTPATIENT
Start: 2025-01-09

## 2025-01-08 RX ORDER — FINASTERIDE 5 MG/1
5 TABLET, FILM COATED ORAL DAILY
Qty: 90 TABLET | Refills: 3 | Status: SHIPPED | OUTPATIENT
Start: 2025-01-08

## 2025-01-08 RX ORDER — ROSUVASTATIN CALCIUM 40 MG/1
40 TABLET, COATED ORAL NIGHTLY
Qty: 30 TABLET | Refills: 0 | Status: SHIPPED | OUTPATIENT
Start: 2025-01-08

## 2025-01-08 NOTE — PROGRESS NOTES
Received orders to discharge patient home w/ spouse. Patient A&O x4. VSS. Patient able to ambulate independently, gait steady. Discharge instructions, both written and verbal, provided to patient and spouse. Yulisa Hoover NP notified of patient's Brillianta monthly cost and said it will be addressed output. Brillant coupon from  provided to patient. Discharge education along with Mercado Care and Mercado Bag emptying education provided, patient and spouse verbalized understanding. Mercado bag provided and Mercado leg bag applied prior to discharge per pt request. Mercado supplies also provided. Mercado stat lock changed prior to discharge. Mercado Cath irrigated prior to discharge per order, return urine appeared with no clots. PIV removed prior to discharge. Patient left unit in stable condition via wheelchair. Family providing transport home.

## 2025-01-08 NOTE — PLAN OF CARE
Problem: Patient Centered Care  Goal: Patient preferences are identified and integrated in the patient's plan of care  Description: Interventions:  - What would you like us to know as we care for you?   - Provide timely, complete, and accurate information to patient/family  - Incorporate patient and family knowledge, values, beliefs, and cultural backgrounds into the planning and delivery of care  - Encourage patient/family to participate in care and decision-making at the level they choose  - Honor patient and family perspectives and choices  Outcome: Progressing     Problem: Patient/Family Goals  Goal: Patient/Family Long Term Goal  Description: Patient's Long Term Goal:     Interventions:  -   - See additional Care Plan goals for specific interventions  Outcome: Progressing  Goal: Patient/Family Short Term Goal  Description: Patient's Short Term Goal:     Interventions:   -   - See additional Care Plan goals for specific interventions  Outcome: Progressing     Problem: CARDIOVASCULAR - ADULT  Goal: Maintains optimal cardiac output and hemodynamic stability  Description: INTERVENTIONS:  - Monitor vital signs, rhythm, and trends  - Monitor for bleeding, hypotension and signs of decreased cardiac output  - Evaluate effectiveness of vasoactive medications to optimize hemodynamic stability  - Monitor arterial and/or venous puncture sites for bleeding and/or hematoma  - Assess quality of pulses, skin color and temperature  - Assess for signs of decreased coronary artery perfusion - ex. Angina  - Evaluate fluid balance, assess for edema, trend weights  Outcome: Progressing  Goal: Absence of cardiac arrhythmias or at baseline  Description: INTERVENTIONS:  - Continuous cardiac monitoring, monitor vital signs, obtain 12 lead EKG if indicated  - Evaluate effectiveness of antiarrhythmic and heart rate control medications as ordered  - Initiate emergency measures for life threatening arrhythmias  - Monitor electrolytes and  administer replacement therapy as ordered  Outcome: Progressing     Problem: PAIN - ADULT  Goal: Verbalizes/displays adequate comfort level or patient's stated pain goal  Description: INTERVENTIONS:  - Encourage pt to monitor pain and request assistance  - Assess pain using appropriate pain scale  - Administer analgesics based on type and severity of pain and evaluate response  - Implement non-pharmacological measures as appropriate and evaluate response  - Consider cultural and social influences on pain and pain management  - Manage/alleviate anxiety  - Utilize distraction and/or relaxation techniques  - Monitor for opioid side effects  - Notify MD/LIP if interventions unsuccessful or patient reports new pain  - Anticipate increased pain with activity and pre-medicate as appropriate  Outcome: Progressing     Problem: GASTROINTESTINAL - ADULT  Goal: Minimal or absence of nausea and vomiting  Description: INTERVENTIONS:  - Maintain adequate hydration with IV or PO as ordered and tolerated  - Nasogastric tube to low intermittent suction as ordered  - Evaluate effectiveness of ordered antiemetic medications  - Provide nonpharmacologic comfort measures as appropriate  - Advance diet as tolerated, if ordered  - Obtain nutritional consult as needed  - Evaluate fluid balance  Outcome: Progressing     Problem: GENITOURINARY - ADULT  Goal: Absence of urinary retention  Description: INTERVENTIONS:  - Assess patient’s ability to void and empty bladder  - Monitor intake/output and perform bladder scan as needed  - Follow urinary retention protocol/standard of care  - Consider collaborating with pharmacy to review patient's medication profile  - Implement strategies to promote bladder emptying  Outcome: Progressing     Problem: SKIN/TISSUE INTEGRITY - ADULT  Goal: Skin integrity remains intact  Description: INTERVENTIONS  - Assess and document risk factors for pressure ulcer development  - Assess and document skin integrity  -  Monitor for areas of redness and/or skin breakdown  - Initiate interventions, skin care algorithm/standards of care as needed  Outcome: Progressing     Problem: HEMATOLOGIC - ADULT  Goal: Free from bleeding injury  Description: (Example usage: patient with low platelets)  INTERVENTIONS:  - Avoid intramuscular injections, enemas and rectal medication administration  - Ensure safe mobilization of patient  - Hold pressure on venipuncture sites to achieve adequate hemostasis  - Assess for signs and symptoms of internal bleeding  - Monitor lab trends  - Patient is to report abnormal signs of bleeding to staff  - Avoid use of toothpicks and dental floss  - Use electric shaver for shaving  - Use soft bristle tooth brush  - Limit straining and forceful nose blowing  Outcome: Progressing     Problem: MUSCULOSKELETAL - ADULT  Goal: Return mobility to safest level of function  Description: INTERVENTIONS:  - Assess patient stability and activity tolerance for standing, transferring and ambulating w/ or w/o assistive devices  - Assist with transfers and ambulation using safe patient handling equipment as needed  - Ensure adequate protection for wounds/incisions during mobilization  - Obtain PT/OT consults as needed  - Advance activity as appropriate  - Communicate ordered activity level and limitations with patient/family  Outcome: Progressing

## 2025-01-08 NOTE — PLAN OF CARE
Patient A&O x4. On RA, saturating well. Patient w/ good appetite, no nausea/ vomiting noted. All scheduled meds given per MAR. Patient w/ frequent bowel movements- MD made aware and PRN Imodium ordered. C-Diff (-). Mercado Cath w/ good urine output, remains red w/ small clots and sediments- APN aware. Irrigation provided as ordered for reported leaking at the insertion site. Patient  ambulates w/ stand by assist. All safety precautions maintained.      Problem: Patient Centered Care  Goal: Patient preferences are identified and integrated in the patient's plan of care  Description: Interventions:  - What would you like us to know as we care for you? \" I own a restaurant\"  - Provide timely, complete, and accurate information to patient/family  - Incorporate patient and family knowledge, values, beliefs, and cultural backgrounds into the planning and delivery of care  - Encourage patient/family to participate in care and decision-making at the level they choose  - Honor patient and family perspectives and choices  1/7/2025 1815 by Elisha Bradshaw RN  Outcome: Progressing  1/7/2025 1814 by Elisha Bradshaw RN  Outcome: Progressing     Problem: CARDIOVASCULAR - ADULT  Goal: Maintains optimal cardiac output and hemodynamic stability  Description: INTERVENTIONS:  - Monitor vital signs, rhythm, and trends  - Monitor for bleeding, hypotension and signs of decreased cardiac output  - Evaluate effectiveness of vasoactive medications to optimize hemodynamic stability  - Monitor arterial and/or venous puncture sites for bleeding and/or hematoma  - Assess quality of pulses, skin color and temperature  - Assess for signs of decreased coronary artery perfusion - ex. Angina  - Evaluate fluid balance, assess for edema, trend weights  1/7/2025 1815 by Elisha Bradshaw RN  Outcome: Progressing  1/7/2025 1814 by Elisha Bradshaw RN  Outcome: Progressing     Problem: GASTROINTESTINAL - ADULT  Goal: Minimal or absence of nausea and  vomiting  Description: INTERVENTIONS:  - Maintain adequate hydration with IV or PO as ordered and tolerated  - Nasogastric tube to low intermittent suction as ordered  - Evaluate effectiveness of ordered antiemetic medications  - Provide nonpharmacologic comfort measures as appropriate  - Advance diet as tolerated, if ordered  - Obtain nutritional consult as needed  - Evaluate fluid balance  Outcome: Progressing     Problem: GENITOURINARY - ADULT  Goal: Absence of urinary retention  Description: INTERVENTIONS:  - Assess patient’s ability to void and empty bladder  - Monitor intake/output and perform bladder scan as needed  - Follow urinary retention protocol/standard of care  - Consider collaborating with pharmacy to review patient's medication profile  - Implement strategies to promote bladder emptying  Outcome: Progressing     Problem: HEMATOLOGIC - ADULT  Goal: Free from bleeding injury  Description: (Example usage: patient with low platelets)  INTERVENTIONS:  - Avoid intramuscular injections, enemas and rectal medication administration  - Ensure safe mobilization of patient  - Hold pressure on venipuncture sites to achieve adequate hemostasis  - Assess for signs and symptoms of internal bleeding  - Monitor lab trends  - Patient is to report abnormal signs of bleeding to staff  - Avoid use of toothpicks and dental floss  - Use electric shaver for shaving  - Use soft bristle tooth brush  - Limit straining and forceful nose blowing  Outcome: Progressing

## 2025-01-08 NOTE — CONSULTS
[facility]   Gastroenterology Consultation Note    Robert Dyer  Patient Status:    Inpatient  Date of Admission:         1/5/2025, Hospital day #3  Attending:   Phoebe Pineda MD  PCP:     Hadley Marcus DO    Reason for Consultation:  Abnormal CT duodenal nodule    History of Present Illness:  Robert Dyer is a a(n) 84 year old male with a history of tobacco dependence, COPD, hypertension, who presented with chest pain initially with V-fib due to STEMI requiring cardioversion, status post RCA PCI, on dual antiplatelet therapy.  Noted to be in urinary retention, CT was obtained which showed nephrolithiasis but also an incidental 1.9 cm nodular area of enhancement in the third portion of the duodenum.  Patient denies prior EGD or colonoscopy.  He denies abdominal pain, nausea, emesis.  Does note recent 10 pound weight loss.  Significant tobacco use history.  No melena or hematochezia.  No constipation.  Appetite has been unchanged.    History:  Past Medical History:    Allergic rhinitis    Centrilobular emphysema (HCC)    Essential hypertension    History of nephrolithiasis    Tobacco dependence    1 PPD at 14yoa, and 3 PPD since 17yoa.      No past surgical history on file.  Family History   Problem Relation Age of Onset    Cancer Father       reports that he has been smoking cigarettes. He started smoking about 71 years ago. He has a 213.1 pack-year smoking history. He has never used smokeless tobacco. He reports that he does not currently use alcohol. He reports that he does not use drugs.    Allergies:  Allergies[1]    Medications:    Current Facility-Administered Medications:     tamsulosin (Flomax) cap 0.4 mg, 0.4 mg, Oral, Daily    finasteride (Proscar) tab 5 mg, 5 mg, Oral, Daily    metoprolol succinate ER (Toprol XL) 24 hr tab 25 mg, 25 mg, Oral, Daily Beta Blocker    loperamide (Imodium) cap 2 mg, 2 mg, Oral, Daily PRN    ondansetron (Zofran) 4 MG/2ML injection 4 mg, 4 mg,  Intravenous, Q6H PRN    ticagrelor (Brilinta) tab 90 mg, 90 mg, Oral, Q12H    aspirin DR tab 81 mg, 81 mg, Oral, Daily    rosuvastatin (Crestor) tab 40 mg, 40 mg, Oral, Nightly  Prescriptions Prior to Admission[2]    Review of Systems:  CONSTITUTIONAL:  negative for fevers, rigors  EYES:  negative for diplopia   RESPIRATORY:  negative for severe shortness of breath  CARDIOVASCULAR:  negative for crushing sub-sternal chest pain  GASTROINTESTINAL:  see HPI  GENITOURINARY:  negative for dysuria or gross hematuria  INTEGUMENT/BREAST:  SKIN:  negative for jaundice   ALLERGIC/IMMUNOLOGIC:  negative for hay fever  ENDOCRINE:  negative for cold intolerance and heat intolerance  MUSCULOSKELETAL:  negative for joint effusion/severe erythema  NEURO: negative for dizziness or loss of conscious or paresthesias  BEHAVIOR/PSYCH:  negative for psychotic behavior    Physical Exam:    Blood pressure 95/57, pulse 86, temperature 98.5 °F (36.9 °C), temperature source Temporal, resp. rate 22, height 5' 8\" (1.727 m), weight 133 lb 14.4 oz (60.7 kg), SpO2 90%. Body mass index is 20.36 kg/m².    General: awake, alert and oriented, no acute distress  HEENT: moist mucus membranes  PULM: no conversational dyspnea  CARDIOVASCULAR: regular rate and rhythm, the extremities are warm and well perfused  GI: soft, non-tender, non-distended, + BS, no rebound/guarding   EXTREMITIES: no edema, moving all extremities  SKIN: no visible rash  NEURO: appropriate and interactive    Laboratory Data:  Lab Results   Component Value Date    WBC 10.9 01/08/2025    HGB 14.2 01/08/2025    HCT 39.9 01/08/2025    .0 01/08/2025    CREATSERUM 1.09 01/08/2025    BUN 12 01/08/2025     01/08/2025    K 3.9 01/08/2025     01/08/2025    CO2 25.0 01/08/2025    GLU 93 01/08/2025    CA 8.8 01/08/2025    ALB 3.7 01/08/2025    ALKPHO 82 01/08/2025    BILT 0.7 01/08/2025    TP 6.1 01/08/2025    AST 55 01/08/2025    ALT 41 01/08/2025       Imaging:  CT  UROGRAM(W+WO) W/3D(CPT=74178/56452)    Result Date: 1/7/2025  CONCLUSION:   Subcentimeter nonobstructing left nephrolithiasis.  Suboptimal evaluation the urinary bladder is the bladder is partially collapsed around a Mercado catheter.  Intraluminal air within the urinary bladder is probably iatrogenic.  Correlation with urinalysis can be performed if there is concern for cystitis.  Hyperdensity within the bladder also noted which could reflect hemorrhage/blood products.  Follow-up direct visualization of the urinary bladder should be considered.  Question subtle pericholecystic fat stranding without radiopaque gallstone.  Advise follow-up ultrasound gallbladder.  Mild-to-moderate diffuse atherosclerosis abdominal aorta.  Short segment occlusion right proximal common iliac artery with distal reconstitution.  1.9 cm nodular area of enhancement 3rd portion duodenum concerning for an underlying duodenal lesion.  Advise correlation with upper endoscopy.  Nondilated fluid-filled small bowel and colon which can suggest an enterocolitis.  Prostatomegaly.  Emphysema.  Lesser incidental findings as above.       Dictated by (CST): Yuniel Saldivar MD on 1/07/2025 at 12:45 PM     Finalized by (CST): Yuniel Saldivar MD on 1/07/2025 at 1:01 PM          XR CHEST AP PORTABLE  (CPT=71045)    Result Date: 1/5/2025  CONCLUSION:  1. Advanced emphysema with subsegmental atelectasis and or scar in right lower lung.  No pneumonia or other acute finding.    Dictated by (CST): Harsh Bernstein MD on 1/05/2025 at 9:00 PM     Finalized by (CST): Harsh Bernstein MD on 1/05/2025 at 9:01 PM           Assessment & Plan   Robert Dyer is a a(n) 84 year old male with a history of tobacco dependence, COPD, hypertension, who presented with chest pain initially with V-fib due to STEMI requiring cardioversion, status post RCA PCI, on dual antiplatelet therapy.  Noted to be in urinary retention, CT was obtained which showed nephrolithiasis but also  an incidental 1.9 cm nodular area of enhancement in the third portion of the duodenum.  Patient denies prior EGD or colonoscopy. Clinical significance of this lesion is not known, possible small submucosal lesion such as neuroendocrine tumor vs leiomyoma vs lipoma, cannot exclude malignancy.  I discussed with the patient and wife this morning that endoscopic evaluation can be pursued as outpatient once he has recovered from acute coronary event.  Additionally he would need to be able to hold Brilinta safely prior to undergoing EGD. This can be discussed as outpatient. I advised they follow-up GI upon in 4-6 weeks.          Amber Hood MD  [facility]  2025    This note was partially prepared using Dragon Medical voice recognition dictation software. As a result, errors may occur. When identified, these errors have been corrected. While every attempt is made to correct errors during dictation, discrepancies may still exist.          [1]   Allergies  Allergen Reactions    Eggs Or Egg-Derived Products RASH   [2]   Medications Prior to Admission   Medication Sig Dispense Refill Last Dose/Taking    amLODIPine 10 MG Oral Tab Take 1 tablet (10 mg total) by mouth daily. 90 tablet 3 2025    albuterol 108 (90 Base) MCG/ACT Inhalation Aero Soln Inhale 2 puffs into the lungs every 6 (six) hours as needed for Wheezing. 1 each 1 2025    fluticasone propionate 50 MCG/ACT Nasal Suspension 2 sprays by Nasal route daily. 16 g 5 2025    [] oseltamivir (TAMIFLU) 75 MG Oral Cap Take 1 capsule (75 mg total) by mouth 2 (two) times daily for 5 days. 10 capsule 0     chlorproMAZINE 25 MG Oral Tab Take 1 tablet (25 mg total) by mouth every 6 (six) hours as needed. 30 tablet 0     famotidine 20 MG Oral Tab Take 1 tablet (20 mg total) by mouth 2 (two) times daily. 90 tablet 0

## 2025-01-08 NOTE — PLAN OF CARE
Problem: Patient Centered Care  Goal: Patient preferences are identified and integrated in the patient's plan of care  Description: Interventions:  - What would you like us to know as we care for you?  From home.   - Provide timely, complete, and accurate information to patient/family  - Incorporate patient and family knowledge, values, beliefs, and cultural backgrounds into the planning and delivery of care  - Encourage patient/family to participate in care and decision-making at the level they choose  - Honor patient and family perspectives and choices  Outcome: Progressing     Problem: PAIN - ADULT  Goal: Verbalizes/displays adequate comfort level or patient's stated pain goal  Description: INTERVENTIONS:  - Encourage pt to monitor pain and request assistance  - Assess pain using appropriate pain scale  - Administer analgesics based on type and severity of pain and evaluate response  - Implement non-pharmacological measures as appropriate and evaluate response  - Consider cultural and social influences on pain and pain management  - Manage/alleviate anxiety  - Utilize distraction and/or relaxation techniques  - Monitor for opioid side effects  - Notify MD/LIP if interventions unsuccessful or patient reports new pain  - Anticipate increased pain with activity and pre-medicate as appropriate  Outcome: Progressing     Problem: GASTROINTESTINAL - ADULT  Goal: Minimal or absence of nausea and vomiting  Description: INTERVENTIONS:  - Maintain adequate hydration with IV or PO as ordered and tolerated  - Nasogastric tube to low intermittent suction as ordered  - Evaluate effectiveness of ordered antiemetic medications  - Provide nonpharmacologic comfort measures as appropriate  - Advance diet as tolerated, if ordered  - Obtain nutritional consult as needed  - Evaluate fluid balance  Outcome: Progressing     Problem: GENITOURINARY - ADULT  Goal: Absence of urinary retention  Description: INTERVENTIONS:  - Assess  patient’s ability to void and empty bladder  - Monitor intake/output and perform bladder scan as needed  - Follow urinary retention protocol/standard of care  - Consider collaborating with pharmacy to review patient's medication profile  - Implement strategies to promote bladder emptying  Outcome: Progressing

## 2025-01-08 NOTE — PROGRESS NOTES
Cardiology Progress Note    Robert Dyer Patient Status:  Inpatient    1940 MRN [id number]   Location [facility] 2W/SW Attending Phoebe Pineda MD   Hosp Day # 3 PCP Hadley Marcus,      Interval Note:  Patient seen and examined  No acute issues overnight  Patient feels well, denies chest pain or shortness of breath  Decent urine output however still having cloudy, brown urine  CT urogram yesterday shows left nephrolithiasis which is nonobstructive      --------------------------------------------------------------------------------------------------------------------------------  ROS 12 systems reviewed, pertinent findings above.  ROS    History:  Past Medical History:    Allergic rhinitis    Centrilobular emphysema (HCC)    Essential hypertension    History of nephrolithiasis    Tobacco dependence    1 PPD at 14yoa, and 3 PPD since 17yoa.      No past surgical history on file.  Family History   Problem Relation Age of Onset    Cancer Father       reports that he has been smoking cigarettes. He started smoking about 71 years ago. He has a 213.1 pack-year smoking history. He has never used smokeless tobacco. He reports that he does not currently use alcohol. He reports that he does not use drugs.    Objective:   Temp: 97.2 °F (36.2 °C)  Pulse: 86  Resp: 22  BP: 95/57    Intake/Output:     Intake/Output Summary (Last 24 hours) at 2025 0933  Last data filed at 2025 0535  Gross per 24 hour   Intake 180 ml   Output 1050 ml   Net -870 ml       Physical Exam:    General: Alert and oriented x 3, no acute distress  HEENT: Normocephalic, anicteric sclera, neck supple.  Neck: No JVD, carotids 2+, no bruits.  Cardiac: Regular rate and rhythm. S1, S2 normal. No murmur, pericardial rub, S3.  Lungs: Clear without wheezes, rales, rhonchi or dullness.  Normal excursions and effort.  Abdomen: Soft, non-tender. BS-present.  Extremities: Without clubbing, cyanosis or edema.  Peripheral pulses are  2+.  Neurologic: Non-focal  Skin: Warm and dry.       Assessment   Inferior STEMI status post RCA PCI X3  VF  due to STEMI required DCCV X3  Transient heart block due to STEMI, resolved  Hematuria  Urinary obstruction status post Mercado  Tobacco abuse history  Hypertension      Plan  Continue statin, DAPT, low-dose metoprolol  Telemetry reviewed, brief NSVT  Patient is stable from car standpoint for discharge  Per urology, he is antibiotics, await urine cultures and will discharge with Mercado    Thank you for allowing me to take part in the care of Robert Dyer. Please call with any questions of concerns.      Level of care: L3    Latrice Barr DO  [facility]   Interventional Cardiac and Vascular Services      Latrice Barr DO  January 08, 2025  9:58 AM

## 2025-01-08 NOTE — CM/SW NOTE
Per nurse-Garrett, pharmacy says itll be $480 a month. Cardiology APN said you might have a first month free coupon?     CM provided Brilinta coupon with instructions to nurse for patient.    CM to remain available for support and/or discharge planning.     Liza ENCISO, RN, CCM  PRN RN CCM  Ext.  [phone]

## 2025-01-08 NOTE — DISCHARGE SUMMARY
[facility]  part of [facility]    DISCHARGE SUMMARY     Robert Dyer Patient Status:  Inpatient    1940 MRN [id number]   Location [facility] 2W/SW Attending Phoebe Pineda MD   Hosp Day # 3 PCP Hadley Marcus DO     Date of Admission: 2025  Date of Discharge:  2025    Discharge Disposition: Home or Self Care    Discharge Diagnosis:     Inferior STEMI s/p PCI x3  Vfib s/p DCCV x3  Transient heart block 2/2 STEMI - resolved  HTN  Urinary obstruction/retention  Hematuria  Duodenal lesion  HTN    History of Present Illness:       Robert Dyer is a 84 year old male with history of COPD, HTN, kidney stones, tobacco dependence who presented to the ED for evaluation of chest pain.  Chest pain has been intermittent throughout the day, worsened about an hour PTA.,  He took 162 mg aspirin prior to arrival.  On arrival to the ED, he was bradycardic.  Given morphine and atropine  Initial EKG showed diffuse ST elevation to leads III and aVF with concerns for inferior MI.  Cardiac alert called.  He was started on heparin drip and taken to Cath Lab.     Troponin 1648, potassium 3.3.  Otherwise stable  CXR: Advanced emphysema     He underwent cardiac catheterization with noted 100% acute thrombotic occlusion of mid RCA.  During cath he had episodes of ventricular fibrillation.  CODE BLUE called.  He was also noted with severe PAD with occluded right common iliac artery.  He had PCI to proximal and mid RCA.  Also had DCCV x 3.     During episodes of V. tach, he remained awake.  Did not require intubation.  Hemodynamics stabilized.  Post cath, he was admitted to the ICU under cardiology.  Hospitalist consulted this a.m. to take over as attending  Labs and vital stable.     At the time of my evaluation, he is sitting in chair.  Denies any chest pain or shortness of breath.  Reports feeling anxious about events.  Wanting to go home.    Brief Synopsis:     Inferior STEMI s/p PCI  x3  Vfib s/p DCCV x3  Transient heart block 2/2 STEMI - resolved  HTN  Cardiology on consult  Toprol XL 25 mg daily  ASA, Brilinta, Statin  Cardiac rehab as opt  Close opt follow up  Urinary obstruction/retention  Hematuria  Urology on consult  Continue abx  Follow cultures  Continue tamsulosin and finasteride  Maintain harris  CT urogram   Opt follow up with cystoscopy  Monitor H&H  Transfuse as indicated  Duodenal lesion  Incidental finding  GI on consult  Opt follow up for EGD  HTN  BP well controlled  Cardiology on consult  Opt follow up    Patient is to remain compliant with all discharge medications and instructions and to follow up as advised.   Patient encouraged to make healthy lifestyle and dietary changes.    Lace+ Score: 73  59-90 High Risk  29-58 Medium Risk  0-28   Low Risk       TCM Follow-Up Recommendation:  LACE > 58: High Risk of readmission after discharge from the hospital.      Procedures during hospitalization:   PCI    Incidental or significant findings and recommendations (brief descriptions):  None    Lab/Test results pending at Discharge:   None     Consultants:  Cardiology  GI  Urology    Discharge Medication List:     Discharge Medications        START taking these medications        Instructions Prescription details   aspirin 81 MG Tbec  Start taking on: January 9, 2025      Take 1 tablet (81 mg total) by mouth daily.   Quantity: 30 tablet  Refills: 0     finasteride 5 MG Tabs  Commonly known as: Proscar      Take 1 tablet (5 mg total) by mouth daily.   Quantity: 90 tablet  Refills: 3     metoprolol succinate ER 25 MG Tb24  Commonly known as: Toprol XL  Start taking on: January 9, 2025      Take 1 tablet (25 mg total) by mouth Daily Beta Blocker.   Quantity: 30 tablet  Refills: 0     rosuvastatin 40 MG Tabs  Commonly known as: Crestor      Take 1 tablet (40 mg total) by mouth nightly.   Quantity: 30 tablet  Refills: 0     tamsulosin 0.4 MG Caps  Commonly known as: Flomax      Take 1  capsule (0.4 mg total) by mouth daily.   Quantity: 90 capsule  Refills: 3     ticagrelor 90 MG Tabs  Commonly known as: Brilinta      Take 1 tablet (90 mg total) by mouth every 12 (twelve) hours.   Quantity: 60 tablet  Refills: 0            CONTINUE taking these medications        Instructions Prescription details   albuterol 108 (90 Base) MCG/ACT Aers  Commonly known as: Ventolin HFA      Inhale 2 puffs into the lungs every 6 (six) hours as needed for Wheezing.   Quantity: 1 each  Refills: 1     chlorproMAZINE 25 MG Tabs  Commonly known as: Thorazine      Take 1 tablet (25 mg total) by mouth every 6 (six) hours as needed.   Quantity: 30 tablet  Refills: 0     famotidine 20 MG Tabs  Commonly known as: Pepcid      Take 1 tablet (20 mg total) by mouth 2 (two) times daily.   Quantity: 90 tablet  Refills: 0     fluticasone propionate 50 MCG/ACT Susp  Commonly known as: Flonase      2 sprays by Nasal route daily.   Quantity: 16 g  Refills: 5            STOP taking these medications      amLODIPine 10 MG Tabs  Commonly known as: Norvasc        oseltamivir 75 MG Caps  Commonly known as: Tamiflu                  Where to Get Your Medications        These medications were sent to [id number] - [place], [place] - [address] AT [address], [phone], [phone] [address], [facility] [address]      Phone: [phone]   aspirin 81 MG Tbec  finasteride 5 MG Tabs  metoprolol succinate ER 25 MG Tb24  rosuvastatin 40 MG Tabs  tamsulosin 0.4 MG Caps  ticagrelor 90 MG Tabs         [place]    Follow-up appointment:   Zoe Henderson MD  [address]  [address]  [place] [address]  [phone]    Follow up      Amber Hood MD  [address]  [place] [address]  [phone]    Follow up in 1 week(s)      Latrice Barr DO  [address]  [address]  [place] [address]  [phone]    Follow up in 1 week(s)      Appointments for Next 30 Days  1/8/2025 - 2/7/2025      None            Vital signs:  Temp:  [97.2 °F (36.2 °C)-98.7 °F (37.1 °C)] 98.5 °F (36.9 °C)  Pulse:  [55-86] 86  Resp:  [13-22] 22  BP: ()/(57-77) 95/57  SpO2:  [90 %-96 %] 90 %    Physical Exam:    Gen: NAD AO x3  Chest: good air entry CTABL  CVS: normal s1 and s2 RR  Abd: NABS soft NT ND  Neuro: CN 2-12 grossly intact  Ext: no edema in bilateral LE    -----------------------------------------------------------------------------------------------  PATIENT DISCHARGE INSTRUCTIONS: See electronic chart    Phoebe Pineda MD  Hospitalist    Time spent:  > 30 minutes    The 21st Century Cures Act makes medical notes like these available to patients in the interest of transparency. Please be advised this is a medical document. Medical documents are intended to carry relevant information, facts as evident, and the clinical opinion of the practitioner. The medical note is intended as peer to peer communication and may appear blunt or direct. It is written in medical language and may contain abbreviations or verbiage that are unfamiliar.

## 2025-01-09 ENCOUNTER — TELEPHONE (OUTPATIENT)
Dept: SURGERY | Facility: CLINIC | Age: 85
End: 2025-01-09

## 2025-01-09 NOTE — TELEPHONE ENCOUNTER
Per spouse patient has a catheter and when he urinates most of the urine is not going through the tube, but around it. Please advise

## 2025-01-09 NOTE — TELEPHONE ENCOUNTER
I s/w pt and spouse and determined that pt is having most of the urine from his bladder leaking out into his diaper and clothing. She states that he gets an urge to urinate and then he rushes to the BR and some urine goes into the toilet and some in the bag. She states that there is not much urine getting into the bag but the urine is clear yellow and pt does not have painful cramps when he gets that strong urge to urinate. I asked if pt could come in tomorrow morning so that we can check out the cath and see if it irrigates easily and she agreed and we arranged an appt for 8:20 am tomorrow.

## 2025-01-09 NOTE — TELEPHONE ENCOUNTER
Spoke with patient, assisted in scheduling voiding trial, and cystoscopy.     Patient confirmed and verbalized understanding.     Future Appointments   Date Time Provider Department Center   1/17/2025  8:20 AM [facility] UROLOGY NURSE [facility]   1/30/2025  2:30 PM Clifford Caro MD [facility]

## 2025-01-10 ENCOUNTER — TELEPHONE (OUTPATIENT)
Facility: CLINIC | Age: 85
End: 2025-01-10

## 2025-01-10 ENCOUNTER — NURSE ONLY (OUTPATIENT)
Dept: SURGERY | Facility: CLINIC | Age: 85
End: 2025-01-10

## 2025-01-10 ENCOUNTER — PATIENT OUTREACH (OUTPATIENT)
Dept: CASE MANAGEMENT | Age: 85
End: 2025-01-10

## 2025-01-10 DIAGNOSIS — R31.0 GROSS HEMATURIA: Primary | ICD-10-CM

## 2025-01-10 PROCEDURE — 51798 US URINE CAPACITY MEASURE: CPT

## 2025-01-10 NOTE — PROGRESS NOTES
I called the patient and wife into the exam room and introduced myself and verified the spelling of his last name and his . Wife days last night the catheter came out on it's own and the balloon at the end of the cathter was no inflated. Patient says he's been able to urinate without a problem since then. Urine color per patient is yellow and he denies pain or burning with urination. I then asked patient to go to the bathroom to try to empty his bladder and I will perform bladder scan after that to make sure he is emptying approprietly. Patient agreed to proceed. Upon patient return from bathroom I scanned patient's bladder and PVR was 38 ml. I then notified Agustin ELIAS, and she instructed patient to return in a week for a PVR and to make sure she completes the cystoscopy that is scheduled for 2025. I then returned to patient's room and explained this. Patient was advised to monitor his urinary output and if he has difficulty urinating to call ur or after hours to proceed to emergency room. Patient and wife verbalized understandings and has no further questions.

## 2025-01-10 NOTE — TELEPHONE ENCOUNTER
Spoke to patient for TCM today.  Patient does not have an appointment scheduled at this time.     JUAN- SOO attempted to schedule an appointment with his Dr. Marcus- patient declined stating right now he feels the priority is getting in with his Cardiologist.       TCM appointment recommended by 01/22/2025 as patient is a High risk for readmission.  Please advise.    BOOK BY DATE (last date for TCM): 01/22/2025    Clinical staff:  Patient declined appointment. Please follow-up with patient and try to get them to schedule as patient would greatly benefit from TCM appointment.  Thank you!

## 2025-01-10 NOTE — PROGRESS NOTES
Transitional Care Management   Discharge Date: 25  Contact Date: 1/10/2025    Assessment:  TCM Initial Assessment    General:  Assessment completed with: Patient  Patient Subjective: Spoke with patient who reports he has been feeling a little tired. The patient reports he went into the Urology office today because his harris fell out. The patient reports he is urinating normally now without issues. The patient denies chest pain, shortness of breath, fever, chills, nausea, vomiting. The patient denies any melena, hematochezia, or bleeding from the gums, nose, or cuts. The patient reports the groin area is healing well and denies any signs of infection such as redness, swelling, discharge, or warmth. The patient reports upon the first day home he had some diarrhea. He reports he had diarrhea in the hospital as well. He reports he no longer has diarrhea today.The patient does admit to feeling weak and has a poor appetite. the patient denies any new or worsening symptoms at this time.  Chief Complaint: Inferior STEMI s/p PCI x3  Vfib s/p DCCV x3  Transient heart block 2/2 STEMI - resolved  HTN  Urinary obstruction/retention  Hematuria  Duodenal lesion  HTN  Verify patient name and  with patient/ caregiver: Yes    Hospital Stay/Discharge:  Tell me what you understand of why you were in the hospital or emergency department: Patient states he was having chest pains  Prior to leaving the hospital were your Discharge Instructions reviewed with you?: Yes  Did you receive a copy of your written Discharge Instructions?: Yes  What questions do you have about your Discharge Instructions?: patient denies  Do you feel better or worse since you left the hospital or emergency department?: Better    Follow - Up Appointment:  Do you have a follow-up appointment?: Yes  Date: 25  Physician: Cardiology  Are there any barriers to getting to your follow-up appointment?: No    Home Health/DME:  Prior to leaving the hospital was  Home Health (HH) arranged for you?: N/A  Are HH needs identified by staff during the assessment?: No     Prior to leaving the hospital or emergency department was Durable Medical Equipment (DME), medical supplies, or infusions arranged for you?: No  Are DME/medical supply/infusions needs identified by staff during this assessment?: No     Medications/Diet:  Did any of your medications change, during or after your hospital stay or ED visit?: Yes  Do you have your new or updated medications?: Yes  Do you understand what your medications are for and possible side effects?: Yes  Are there any reasons that keep you from taking your medication as prescribed?: No  Any concerns about medication refills?: No    Were you given a different diet per your Discharge Instructions?: No     Questions/Concerns:  Do you have any questions or concerns that have not been discussed?: No         Nursing Interventions:    NCM provided education on signs and symptoms of infection and blood clots.   Groin precautions advised  Advised to be up and walking as tolerated.   Reviewed signs of bleeding with the patient     Patient reports Cardiology follow up scheduled for 01/17/2025   Urology follow up scheduled for 01/17/2025     NCM attempted to schedule an appointment with his PCP- patient declined stating right now he feels the priority is getting in with his Cardiologist. A telephone encounter has been sent to the PCP office as an FYI/per TCM protocol.     All d/c instructions reviewed with pt.  Reviewed when to call MD vs when to go to ER/call 911.  Educated pt on the importance of taking all meds as prescribed as well as close f/u with PCP/specialists.  Pt verbalized understanding and will contact office with any further questions or concerns.       Medication Review:   Current Outpatient Medications   Medication Sig Dispense Refill    finasteride 5 MG Oral Tab Take 1 tablet (5 mg total) by mouth daily. 90 tablet 3    tamsulosin 0.4 MG  Oral Cap Take 1 capsule (0.4 mg total) by mouth daily. 90 capsule 3    aspirin 81 MG Oral Tab EC Take 1 tablet (81 mg total) by mouth daily. 30 tablet 0    metoprolol succinate ER 25 MG Oral Tablet 24 Hr Take 1 tablet (25 mg total) by mouth Daily Beta Blocker. 30 tablet 0    rosuvastatin 40 MG Oral Tab Take 1 tablet (40 mg total) by mouth nightly. 30 tablet 0    ticagrelor 90 MG Oral Tab Take 1 tablet (90 mg total) by mouth every 12 (twelve) hours. 60 tablet 0    chlorproMAZINE 25 MG Oral Tab Take 1 tablet (25 mg total) by mouth every 6 (six) hours as needed. 30 tablet 0    famotidine 20 MG Oral Tab Take 1 tablet (20 mg total) by mouth 2 (two) times daily. 90 tablet 0    albuterol 108 (90 Base) MCG/ACT Inhalation Aero Soln Inhale 2 puffs into the lungs every 6 (six) hours as needed for Wheezing. 1 each 1    fluticasone propionate 50 MCG/ACT Nasal Suspension 2 sprays by Nasal route daily. 16 g 5     Did patient review medications using current pill bottles and not just a medication list?  Yes  Discharge medications reviewed/discussed/and reconciled against outpatient medications with patient.  Any changes or updates to medications sent to primary care provider.  Patient Acknowledged    SDOH:   Transportation Needs: No Transportation Needs (1/5/2025)    Transportation Needs     Lack of Transportation: No     Car Seat: Not on file     Financial Resource Strain: Low Risk  (6/9/2023)    Financial Resource Strain     Difficulty of Paying Living Expenses: Not very hard     Med Affordability: No         Follow-up Appointments:  Your appointments       Date & Time Appointment Department (Center)    Jan 17, 2025 8:20 AM CST Nurse Visit with [facility] UROLOGY NURSE [facility]        Jan 30, 2025 2:30 PM CST Procedure with Clifford Caro MD [facility]              [place]  [facility] [place] [address]  [facility] [address] [phone]            Transitional Care Clinic  Was TCC Ordered: No      Primary Care Provider (If no TCC appointment)  Does patient already have a PCP appointment scheduled? No  Nurse Care Manager Attempted to schedule PCP office TCM appointment with patient     Specialist  Does the patient have any other follow-up appointment(s) that need to be scheduled? No   -If yes: Nurse Care Manager reviewed upcoming specialist appointments with patient: Yes   -Does the patient need assistance scheduling appointment(s): No    CCM referral placed:  No

## 2025-01-13 ENCOUNTER — TELEPHONE (OUTPATIENT)
Dept: SURGERY | Facility: CLINIC | Age: 85
End: 2025-01-13

## 2025-01-13 NOTE — TELEPHONE ENCOUNTER
Called and spoke with wife regarding patient's symptoms. Denies dysuria. States he has frequency, but that has been ongoing for some time. Recently started on Tamsulosin 0.4 mg and Finasteride 5 mg every day while in the hospital, per his wife, he is still currently taking them.    Future Appointments   Date Time Provider Department Center   1/17/2025  8:20 AM [facility]   1/30/2025  2:30 PM [facility]

## 2025-01-13 NOTE — TELEPHONE ENCOUNTER
Per spouse patient has low grade fever, has appointment on 1/17, asking if patient should be seen sooner? Please call thank you.

## 2025-01-13 NOTE — TELEPHONE ENCOUNTER
Called patient's wife, verified name and  of her  Robert. Wife says her  has a fever of 99.2 but no other symptoms. She says he is urinating as usual no symptoms of not being able to empty his bladder. He has frequency which is usual for him. No blood, no pain, no burning when urinating. I let wife know to monitor his symptoms and if there are any other new symptoms to give us a call. I also advised wife if other symptoms appear that are not urology related they might need to call patient' primary provider.   Patient's wife agreed, verbalized understandings and has no further questions.

## 2025-01-17 ENCOUNTER — NURSE ONLY (OUTPATIENT)
Dept: SURGERY | Facility: CLINIC | Age: 85
End: 2025-01-17

## 2025-01-17 DIAGNOSIS — N39.0 RECURRENT UTI: Primary | ICD-10-CM

## 2025-01-17 LAB
BILIRUB UR QL: NEGATIVE
COLOR UR: YELLOW
GLUCOSE UR-MCNC: NORMAL MG/DL
KETONES UR-MCNC: NEGATIVE MG/DL
LEUKOCYTE ESTERASE UR QL STRIP.AUTO: 500
NITRITE UR QL STRIP.AUTO: NEGATIVE
PH UR: 6 [PH] (ref 5–8)
PROT UR-MCNC: 100 MG/DL
SP GR UR STRIP: 1.02 (ref 1–1.03)
UROBILINOGEN UR STRIP-ACNC: NORMAL
WBC #/AREA URNS AUTO: >50 /HPF
WBC CLUMPS UR QL AUTO: PRESENT /HPF

## 2025-01-17 PROCEDURE — 51798 US URINE CAPACITY MEASURE: CPT

## 2025-01-17 PROCEDURE — 1111F DSCHRG MED/CURRENT MED MERGE: CPT

## 2025-01-17 NOTE — PROGRESS NOTES
I called pt and wife into the exam room and I introduced myself and verified pt's name and  and explained that I would like him to try to empty his bladder in the bathroom. Patient says that he's been having burning and frequency when urination. No blood no fever. Wife also mentioning the urine is cloudy sometimes. I let wife and patient know in this case we will also collect a urine sample, to make sure he doesn't have an infection. Patient then was instructed to go to the bathroom, provide the sample and empty his bladder. Patient provided the sample, emptied his bladder and came back to the exam room and gave pt a sheet to protect his clothing and I placed him on the exam table in supine position and asked him to lower his bottom clothing enough for me to access his lower abdomen where the bladder is. I then performed the bladder scan which noted 12 ml retained. I told pt that this is excellent and that he should continue to monitor for any worsening changes and call to report. He should also drink enough fluids to help clear the urine up and we will call him once we get the urine culture results. I assisted him off the exam table and I sent him and his wife on their way.

## 2025-01-20 ENCOUNTER — TELEPHONE (OUTPATIENT)
Dept: SURGERY | Facility: CLINIC | Age: 85
End: 2025-01-20

## 2025-01-20 RX ORDER — SULFAMETHOXAZOLE AND TRIMETHOPRIM 800; 160 MG/1; MG/1
1 TABLET ORAL 2 TIMES DAILY
Qty: 14 TABLET | Refills: 0 | Status: SHIPPED | OUTPATIENT
Start: 2025-01-20 | End: 2025-01-27

## 2025-01-20 NOTE — TELEPHONE ENCOUNTER
Per patient's wife is calling asking about the Urine Culture test and if the results are in yet. Please advise.

## 2025-01-20 NOTE — TELEPHONE ENCOUNTER
----- Message from Madelyn Luis sent at 1/20/2025  7:40 AM CST -----  [facility] sent. Can we make sure this patient got the message and picks up his antibiotics? Thank you    Future Appointments  1/27/2025  8:00 AM    Clifford Caro MD       [facility]

## 2025-01-20 NOTE — TELEPHONE ENCOUNTER
I s/w pt's spouse and pt and informed him of the  results and instructions in SL's notes as stated below and she verbalized understanding and compliance ad had some questions about the cysto procedure and if pt still needs to have it and I encouraged that he keep the appt as scjd as he has had UTIs' sand it is best to have the procedure to see if there is anything in the bladder that could be causing the UTI's. Pt agreed to proceed with cysto.            Robert,  Your urine culture is positive for infection. I am sending an antibiotic to your pharmacy. Please complete the full course.  Have a good day,  ROXI Vargas   Written by ROXI Clarke on 1/20/2025  7:40 AM CST

## 2025-01-20 NOTE — TELEPHONE ENCOUNTER
Per patient's wife calling stating patient's cardiologist \"was disapproving of cystoscopy due to patient having stents put in, as well as patient being on blood thinner\". Please advise.

## 2025-01-21 ENCOUNTER — ORDER TRANSCRIPTION (OUTPATIENT)
Dept: CARDIAC REHAB | Facility: HOSPITAL | Age: 85
End: 2025-01-21

## 2025-01-21 DIAGNOSIS — I21.9 MYOCARDIAL INFARCTION (HCC): Primary | ICD-10-CM

## 2025-01-21 DIAGNOSIS — Z95.820 S/P ANGIOPLASTY WITH STENT: ICD-10-CM

## 2025-01-23 ENCOUNTER — TELEPHONE (OUTPATIENT)
Facility: CLINIC | Age: 85
End: 2025-01-23

## 2025-01-23 ENCOUNTER — TELEPHONE (OUTPATIENT)
Dept: SURGERY | Facility: CLINIC | Age: 85
End: 2025-01-23

## 2025-01-23 NOTE — TELEPHONE ENCOUNTER
Called patient's wife, verified name and  of her  Robert. Wife is asking for the cysto to be cancelled, because Robert has been having breathing issues, and she is contacting primary to notify them of this, and she also says they need to figure the breathing issues before the cystoscopy. Wife will be calling to reschedule cystoscopy after Robert feels better.

## 2025-01-23 NOTE — TELEPHONE ENCOUNTER
1st Attempt:1/23/25    Outreach patient to help schedule Medicare Annual Super Visit. Per patient unable to make appointment at the moment just had a heart attack and will call back to make appointment.       Last visit: 04/09/24

## 2025-01-23 NOTE — TELEPHONE ENCOUNTER
Per spouse needs to cancel patient's cysto on 1/27 due to health issue. Per spouse he is on an antibiotic and asking if he needs to do a repeat urine culture. Please advise

## 2025-01-23 NOTE — TELEPHONE ENCOUNTER
Patient called, verified Name and . States he had a heart attack and ended up getting three stents on . He is prescribed new medications including metoprolol, aspirin, finasteride, tamsulosin, ticagrelor, rosuvastatin, and antibiotic for urinary tract infection. He will be starting rehab on . Also Cardiology followup in March. He is unsure when to see Dr. Marcus but is able to schedule annual visit on 3/14.    Dr. Marcus please advise if okay to use any SDA visit for patient's hospital followup. Thank you.

## 2025-01-24 NOTE — TELEPHONE ENCOUNTER
Outreach patient and stated the message below. Patient has been scheduled for 1/28/25 at 1:30 pm.

## 2025-01-28 ENCOUNTER — OFFICE VISIT (OUTPATIENT)
Facility: CLINIC | Age: 85
End: 2025-01-28
Payer: MEDICARE

## 2025-01-28 VITALS
OXYGEN SATURATION: 96 % | HEART RATE: 64 BPM | DIASTOLIC BLOOD PRESSURE: 72 MMHG | WEIGHT: 132 LBS | HEIGHT: 68 IN | BODY MASS INDEX: 20 KG/M2 | SYSTOLIC BLOOD PRESSURE: 116 MMHG

## 2025-01-28 DIAGNOSIS — Z95.5 S/P CORONARY ARTERY STENT PLACEMENT: ICD-10-CM

## 2025-01-28 DIAGNOSIS — Z72.0 TOBACCO USE: ICD-10-CM

## 2025-01-28 DIAGNOSIS — I21.11 ST ELEVATION MYOCARDIAL INFARCTION INVOLVING RIGHT CORONARY ARTERY (HCC): Primary | ICD-10-CM

## 2025-01-28 DIAGNOSIS — I10 ESSENTIAL HYPERTENSION: ICD-10-CM

## 2025-01-28 DIAGNOSIS — R93.5 ABNORMAL CT OF THE ABDOMEN: ICD-10-CM

## 2025-01-28 DIAGNOSIS — I49.01 VENTRICULAR FIBRILLATION (HCC): ICD-10-CM

## 2025-01-28 DIAGNOSIS — K59.09 OTHER CONSTIPATION: ICD-10-CM

## 2025-01-28 DIAGNOSIS — I25.10 ARTERIOSCLEROSIS OF CORONARY ARTERY: ICD-10-CM

## 2025-01-28 DIAGNOSIS — J43.2 CENTRILOBULAR EMPHYSEMA (HCC): ICD-10-CM

## 2025-01-28 PROCEDURE — 1111F DSCHRG MED/CURRENT MED MERGE: CPT | Performed by: FAMILY MEDICINE

## 2025-01-28 PROCEDURE — 1160F RVW MEDS BY RX/DR IN RCRD: CPT | Performed by: FAMILY MEDICINE

## 2025-01-28 PROCEDURE — 1159F MED LIST DOCD IN RCRD: CPT | Performed by: FAMILY MEDICINE

## 2025-01-28 PROCEDURE — 99215 OFFICE O/P EST HI 40 MIN: CPT | Performed by: FAMILY MEDICINE

## 2025-01-28 RX ORDER — CLOPIDOGREL BISULFATE 75 MG
TABLET ORAL
COMMUNITY
Start: 2025-01-23

## 2025-01-28 NOTE — PROGRESS NOTES
HPI:    Patient ID: Robert Dyer is a 84 year old male who presents for hospital f/u.    HPI  Was hospitalized 1/5-1/8 for chest pain and dx with STEMI and V-fib. Taken to cath lab and had 100% acute thrombotic occlusion of mid RCA. S/p PCI x3. During cath he had episodes of ventricular fibrillation. He was also noted with severe PAD with occluded right common iliac artery. Also had DCCV x 3.     Feels tired still.   Also c/o constipation. Last BM was yesterday and was normal.   Appetite is chronically low. Eats one meal per day.   Trying to eat more.   Taking prune juice 2x/day. Good with fluid intake.   Taking meds as prescribed.   Saw Dr. Barr on 1/23/25. Plans for f/u in 2 months.   Starting cardiac rehab on 2/4/25.   Has not smoked since he was discharged.   No other symptoms as of today.     Past Medical History:    Allergic rhinitis    Centrilobular emphysema (HCC)    Essential hypertension    History of nephrolithiasis    History of percutaneous coronary intervention    STEMI (ST elevation myocardial infarction) (HCC)    Tobacco dependence    1 PPD at 14yoa, and 3 PPD since 17yoa.     Ventricular fibrillation (HCC)        Current Outpatient Medications   Medication Sig Dispense Refill    PLAVIX 75 MG Oral Tab Plavix 75 mg tablet, [RxNorm: 823540]      finasteride 5 MG Oral Tab Take 1 tablet (5 mg total) by mouth daily. 90 tablet 3    tamsulosin 0.4 MG Oral Cap Take 1 capsule (0.4 mg total) by mouth daily. 90 capsule 3    aspirin 81 MG Oral Tab EC Take 1 tablet (81 mg total) by mouth daily. 30 tablet 0    metoprolol succinate ER 25 MG Oral Tablet 24 Hr Take 1 tablet (25 mg total) by mouth Daily Beta Blocker. 30 tablet 0    rosuvastatin 40 MG Oral Tab Take 1 tablet (40 mg total) by mouth nightly. 30 tablet 0    chlorproMAZINE 25 MG Oral Tab Take 1 tablet (25 mg total) by mouth every 6 (six) hours as needed. 30 tablet 0    famotidine 20 MG Oral Tab Take 1 tablet (20 mg total) by mouth 2 (two) times  daily. 90 tablet 0    albuterol 108 (90 Base) MCG/ACT Inhalation Aero Soln Inhale 2 puffs into the lungs every 6 (six) hours as needed for Wheezing. 1 each 1    fluticasone propionate 50 MCG/ACT Nasal Suspension 2 sprays by Nasal route daily. 16 g 5        Allergies[1]    Review of Systems   Constitutional:  Positive for fatigue.   Respiratory: Negative.     Cardiovascular: Negative.    Gastrointestinal:  Positive for constipation. Negative for blood in stool.   Neurological: Negative.    All other systems reviewed and are negative.           /72   Pulse 64   Ht 5' 8\" (1.727 m)   Wt 132 lb (59.9 kg)   SpO2 96%   BMI 20.07 kg/m²     PHYSICAL EXAM:   Physical Exam  Constitutional:       General: He is not in acute distress.     Appearance: Normal appearance. He is well-developed. He is not ill-appearing, toxic-appearing or diaphoretic.   HENT:      Head: Normocephalic and atraumatic.      Right Ear: Tympanic membrane, ear canal and external ear normal.      Left Ear: Tympanic membrane, ear canal and external ear normal.      Nose: Nose normal.      Mouth/Throat:      Mouth: Mucous membranes are moist.      Pharynx: Oropharynx is clear. No oropharyngeal exudate or posterior oropharyngeal erythema.   Eyes:      Extraocular Movements: Extraocular movements intact.      Conjunctiva/sclera: Conjunctivae normal.   Cardiovascular:      Rate and Rhythm: Normal rate and regular rhythm.      Pulses: Normal pulses.      Heart sounds: Normal heart sounds. No murmur heard.     No friction rub. No gallop.   Pulmonary:      Effort: Pulmonary effort is normal. No respiratory distress.      Breath sounds: Normal breath sounds. No wheezing or rales.   Abdominal:      General: Bowel sounds are normal. There is no distension.      Palpations: Abdomen is soft. There is no mass.      Tenderness: There is no abdominal tenderness. There is no guarding or rebound.      Hernia: No hernia is present.   Musculoskeletal:      Cervical  back: Neck supple.      Right lower leg: No edema.      Left lower leg: No edema.   Skin:     General: Skin is warm and dry.      Capillary Refill: Capillary refill takes less than 2 seconds.   Neurological:      General: No focal deficit present.      Mental Status: He is alert.   Psychiatric:         Mood and Affect: Mood normal.         Behavior: Behavior normal.         Thought Content: Thought content normal.         Judgment: Judgment normal.             ASSESSMENT/PLAN:     Encounter Diagnoses   Name Primary?    ST elevation myocardial infarction involving right coronary artery (HCC) Yes    S/P coronary artery stent placement     Arteriosclerosis of coronary artery     Ventricular fibrillation (HCC)     Essential hypertension     Centrilobular emphysema (HCC)     Tobacco use     Other constipation     Abnormal CT of the abdomen        1. ST elevation myocardial infarction involving right coronary artery (HCC)    2. S/P coronary artery stent placement    3. Arteriosclerosis of coronary artery    4. Ventricular fibrillation (HCC)    5. Essential hypertension    6. Centrilobular emphysema (HCC)    7. Tobacco use    8. Other constipation    9. Abnormal CT of the abdomen     -Reviewed hospital records & results.   -Overall stable since discharge.  -Continue current medications as prescribed.   -Strongly encouraged to continue to avoid smoking.   -To start cardiac rehab next week.  -Has cardiology f/u in 2 months.   -Duodenal lesion: To f/u with GI to discuss EGD (& colonoscopy).   -He will see me for medicare annual in 03/2025. To call sooner prn.     Meds This Visit:  Requested Prescriptions      No prescriptions requested or ordered in this encounter       Imaging & Referrals:  None    A total of 50 minutes were spent with patient and reviewing medical records pertaining to this visit.        Hadley Marcus, DO  ID#[id number]         [1]   Allergies  Allergen Reactions    Eggs Or Egg-Derived Products RASH

## 2025-02-04 ENCOUNTER — CARDPULM VISIT (OUTPATIENT)
Dept: CARDIAC REHAB | Facility: HOSPITAL | Age: 85
End: 2025-02-04
Attending: INTERNAL MEDICINE
Payer: MEDICARE

## 2025-02-04 DIAGNOSIS — Z95.820 S/P ANGIOPLASTY WITH STENT: Primary | ICD-10-CM

## 2025-02-11 ENCOUNTER — APPOINTMENT (OUTPATIENT)
Dept: CARDIAC REHAB | Facility: HOSPITAL | Age: 85
End: 2025-02-11
Attending: INTERNAL MEDICINE
Payer: MEDICARE

## 2025-02-11 PROCEDURE — 93798 PHYS/QHP OP CAR RHAB W/ECG: CPT

## 2025-02-13 ENCOUNTER — CARDPULM VISIT (OUTPATIENT)
Dept: CARDIAC REHAB | Facility: HOSPITAL | Age: 85
End: 2025-02-13
Attending: INTERNAL MEDICINE
Payer: MEDICARE

## 2025-02-13 PROCEDURE — 93798 PHYS/QHP OP CAR RHAB W/ECG: CPT

## 2025-02-17 ENCOUNTER — OFFICE VISIT (OUTPATIENT)
Facility: CLINIC | Age: 85
End: 2025-02-17
Payer: MEDICARE

## 2025-02-17 ENCOUNTER — NURSE TRIAGE (OUTPATIENT)
Facility: CLINIC | Age: 85
End: 2025-02-17

## 2025-02-17 VITALS
OXYGEN SATURATION: 96 % | HEART RATE: 72 BPM | BODY MASS INDEX: 20.16 KG/M2 | HEIGHT: 68 IN | WEIGHT: 133 LBS | SYSTOLIC BLOOD PRESSURE: 126 MMHG | DIASTOLIC BLOOD PRESSURE: 80 MMHG

## 2025-02-17 DIAGNOSIS — R05.1 ACUTE COUGH: Primary | ICD-10-CM

## 2025-02-17 PROCEDURE — 99213 OFFICE O/P EST LOW 20 MIN: CPT | Performed by: FAMILY MEDICINE

## 2025-02-17 PROCEDURE — 1159F MED LIST DOCD IN RCRD: CPT | Performed by: FAMILY MEDICINE

## 2025-02-17 PROCEDURE — 1160F RVW MEDS BY RX/DR IN RCRD: CPT | Performed by: FAMILY MEDICINE

## 2025-02-17 RX ORDER — BENZONATATE 200 MG/1
200 CAPSULE ORAL 3 TIMES DAILY PRN
Qty: 30 CAPSULE | Refills: 0 | Status: SHIPPED | OUTPATIENT
Start: 2025-02-17

## 2025-02-17 NOTE — TELEPHONE ENCOUNTER
I'm not sure antibiotics would be the right/best treatment for him. I agree and would need evaluation to determine best course of treatment. Can offer SDA tomorrow or Wednesday if prefers to not come in today.

## 2025-02-17 NOTE — PROGRESS NOTES
HPI:    Patient ID: Robert Dyer is a 84 year old male who presents for cough.    HPI  Sx started yesterday.  Sx include cough, sore throat, and runny nose.   Feels better today.   Coricidin helps.   No other associated symptoms.   No known sick contacts.     Past Medical History:    Allergic rhinitis    Centrilobular emphysema (HCC)    Essential hypertension    History of nephrolithiasis    History of percutaneous coronary intervention    STEMI (ST elevation myocardial infarction) (HCC)    Tobacco dependence    1 PPD at 14yoa, and 3 PPD since 17yoa.     Ventricular fibrillation (HCC)        Current Outpatient Medications   Medication Sig Dispense Refill    benzonatate 200 MG Oral Cap Take 1 capsule (200 mg total) by mouth 3 (three) times daily as needed for cough. 30 capsule 0    PLAVIX 75 MG Oral Tab Plavix 75 mg tablet, [RxNorm: 047672]      finasteride 5 MG Oral Tab Take 1 tablet (5 mg total) by mouth daily. 90 tablet 3    tamsulosin 0.4 MG Oral Cap Take 1 capsule (0.4 mg total) by mouth daily. 90 capsule 3    aspirin 81 MG Oral Tab EC Take 1 tablet (81 mg total) by mouth daily. 30 tablet 0    metoprolol succinate ER 25 MG Oral Tablet 24 Hr Take 1 tablet (25 mg total) by mouth Daily Beta Blocker. 30 tablet 0    rosuvastatin 40 MG Oral Tab Take 1 tablet (40 mg total) by mouth nightly. 30 tablet 0    famotidine 20 MG Oral Tab Take 1 tablet (20 mg total) by mouth 2 (two) times daily. 90 tablet 0    albuterol 108 (90 Base) MCG/ACT Inhalation Aero Soln Inhale 2 puffs into the lungs every 6 (six) hours as needed for Wheezing. 1 each 1    fluticasone propionate 50 MCG/ACT Nasal Suspension 2 sprays by Nasal route daily. 16 g 5    chlorproMAZINE 25 MG Oral Tab Take 1 tablet (25 mg total) by mouth every 6 (six) hours as needed. (Patient not taking: Reported on 2/17/2025) 30 tablet 0        Allergies[1]    Review of Systems   Constitutional: Negative.    HENT:  Positive for rhinorrhea and sore throat.    Respiratory:   Positive for cough. Negative for chest tightness, shortness of breath and wheezing.    Cardiovascular: Negative.    Gastrointestinal: Negative.    Neurological: Negative.    All other systems reviewed and are negative.     See HPI. Otherwise negative.         /80   Pulse 72   Ht 5' 8\" (1.727 m)   Wt 133 lb (60.3 kg)   SpO2 96%   BMI 20.22 kg/m²     PHYSICAL EXAM:   Physical Exam  Constitutional:       General: He is not in acute distress.     Appearance: Normal appearance. He is well-developed. He is not ill-appearing, toxic-appearing or diaphoretic.   HENT:      Head: Normocephalic and atraumatic.      Right Ear: Tympanic membrane, ear canal and external ear normal.      Left Ear: Tympanic membrane, ear canal and external ear normal.      Nose: Rhinorrhea (clear) present.      Mouth/Throat:      Mouth: Mucous membranes are moist.      Pharynx: Oropharynx is clear. No oropharyngeal exudate or posterior oropharyngeal erythema.   Eyes:      Extraocular Movements: Extraocular movements intact.      Conjunctiva/sclera: Conjunctivae normal.   Cardiovascular:      Rate and Rhythm: Normal rate and regular rhythm.      Pulses: Normal pulses.      Heart sounds: Normal heart sounds. No murmur heard.     No friction rub. No gallop.   Pulmonary:      Effort: Pulmonary effort is normal. No respiratory distress.      Breath sounds: Normal breath sounds. No wheezing or rales.   Musculoskeletal:      Cervical back: Neck supple.      Right lower leg: No edema.      Left lower leg: No edema.   Skin:     General: Skin is warm and dry.      Capillary Refill: Capillary refill takes less than 2 seconds.   Neurological:      General: No focal deficit present.      Mental Status: He is alert.   Psychiatric:         Mood and Affect: Mood normal.         Behavior: Behavior normal.         Thought Content: Thought content normal.         Judgment: Judgment normal.             ASSESSMENT/PLAN:     Encounter Diagnosis   Name Primary?     Acute cough Yes       1. Acute cough       Meds This Visit:  Requested Prescriptions     Signed Prescriptions Disp Refills    benzonatate 200 MG Oral Cap 30 capsule 0     Sig: Take 1 capsule (200 mg total) by mouth 3 (three) times daily as needed for cough.       Imaging & Referrals:  None       Hadley Marcus DO  ID#[id number]       [1]   Allergies  Allergen Reactions    Eggs Or Egg-Derived Products RASH

## 2025-02-17 NOTE — TELEPHONE ENCOUNTER
Action Requested: Summary for Provider     []  Critical Lab, Recommendations Needed  [] Need Additional Advice  []   FYI    []   Need Orders  [] Need Medications Sent to Pharmacy  []  Other     SUMMARY: c/o cold like symoptoms starting yesterday, he has a cough and runny nose. No SOB or fevers. Has a history of COPD. the patient would like to be started on antibiotics. States it is too cold to come in for an appointment.     Reason for call: Cold  Onset: 24 hours    The patient developed a cough and a runny nose yesterday. No chills or fevers, no shortness of breath. The patient is not bringing anything up with his cough. States he had a sore throat a few days ago but does not any longer. He is not wheezing, has a history of COPD. Should be seen in the office but the patient does not wish to come in with how cold it is outside. Dr. Marcus please advise - the patient is requesting antibiotics - does not want to come in with the cold.     Reason for Disposition   Known COPD or other severe lung disease (i.e., bronchiectasis, cystic fibrosis, lung surgery) and worsening symptoms (i.e., increased sputum purulence or amount, increased breathing difficulty)    Protocols used: Cough-A-OH

## 2025-02-17 NOTE — TELEPHONE ENCOUNTER
Spoke to patient. Providers recommendations reviewed. Offered appointment. Patient declined. States that he has trouble swollen capsules will cut them and pour into liquid. If that doesn't work he will go to urgent care. No further questions at this time.    DISPLAY PLAN FREE TEXT

## 2025-02-24 ENCOUNTER — CARDPULM VISIT (OUTPATIENT)
Dept: CARDIAC REHAB | Facility: HOSPITAL | Age: 85
End: 2025-02-24
Attending: INTERNAL MEDICINE
Payer: MEDICARE

## 2025-02-24 PROCEDURE — 93798 PHYS/QHP OP CAR RHAB W/ECG: CPT

## 2025-02-27 ENCOUNTER — CARDPULM VISIT (OUTPATIENT)
Dept: CARDIAC REHAB | Facility: HOSPITAL | Age: 85
End: 2025-02-27
Attending: INTERNAL MEDICINE
Payer: MEDICARE

## 2025-02-27 PROCEDURE — 93798 PHYS/QHP OP CAR RHAB W/ECG: CPT

## 2025-03-03 ENCOUNTER — CARDPULM VISIT (OUTPATIENT)
Dept: CARDIAC REHAB | Facility: HOSPITAL | Age: 85
End: 2025-03-03
Attending: INTERNAL MEDICINE
Payer: MEDICARE

## 2025-03-03 PROCEDURE — 93798 PHYS/QHP OP CAR RHAB W/ECG: CPT

## 2025-03-04 ENCOUNTER — APPOINTMENT (OUTPATIENT)
Dept: CARDIAC REHAB | Facility: HOSPITAL | Age: 85
End: 2025-03-04
Attending: INTERNAL MEDICINE
Payer: MEDICARE

## 2025-03-06 ENCOUNTER — CARDPULM VISIT (OUTPATIENT)
Dept: CARDIAC REHAB | Facility: HOSPITAL | Age: 85
End: 2025-03-06
Attending: INTERNAL MEDICINE
Payer: MEDICARE

## 2025-03-06 PROCEDURE — 93798 PHYS/QHP OP CAR RHAB W/ECG: CPT

## 2025-03-10 ENCOUNTER — NURSE TRIAGE (OUTPATIENT)
Facility: CLINIC | Age: 85
End: 2025-03-10

## 2025-03-10 ENCOUNTER — CARDPULM VISIT (OUTPATIENT)
Dept: CARDIAC REHAB | Facility: HOSPITAL | Age: 85
End: 2025-03-10
Attending: INTERNAL MEDICINE
Payer: MEDICARE

## 2025-03-10 PROCEDURE — 93798 PHYS/QHP OP CAR RHAB W/ECG: CPT

## 2025-03-10 NOTE — TELEPHONE ENCOUNTER
Action Requested: Summary for Provider     []  Critical Lab, Recommendations Needed  [x] Need Additional Advice  []   FYI    []   Need Orders  [] Need Medications Sent to Pharmacy  []  Other     SUMMARY:   Spoke with patient's daughter per KELY, Date of Birth verified  She stated as soon as patient came home from the hosp, he developed hives/ rash, red, he is breaking out on his back, neck, nose.   He saw PCP recently but he forgot to tell him about this.   He applied hydrocortisone cream with relief but it keeps coming back.   She wants to know if any of his current meds is causing him to break out?   She was offered an appt for patient but declined.   Patient negative for chest pain, shortness of breath, no other sx.   She was advised if sx persist or gets worse to take him to ER/ IC, she agreed and stated understanding.   pls advise, thanks in advance.       Reason for call: rash  Onset: 2 weeks          Reason for Disposition   Hives or itching    Protocols used: Rash - Widespread While On Drugs-A-OH

## 2025-03-10 NOTE — TELEPHONE ENCOUNTER
Relayed 's recommendations to patient's daughter (HIPAA). Verbalized understanding and willingness to comply.

## 2025-03-10 NOTE — TELEPHONE ENCOUNTER
I recommend using an OTC antihistamine daily, such as zyrtec once daily, for the next 2-3 weeks. If rash resolves, may then discontinue. If persists, please have patient call back. Thank you.

## 2025-03-11 ENCOUNTER — APPOINTMENT (OUTPATIENT)
Dept: CARDIAC REHAB | Facility: HOSPITAL | Age: 85
End: 2025-03-11
Attending: INTERNAL MEDICINE
Payer: MEDICARE

## 2025-03-13 ENCOUNTER — CARDPULM VISIT (OUTPATIENT)
Dept: CARDIAC REHAB | Facility: HOSPITAL | Age: 85
End: 2025-03-13
Attending: INTERNAL MEDICINE
Payer: MEDICARE

## 2025-03-13 PROCEDURE — 93798 PHYS/QHP OP CAR RHAB W/ECG: CPT

## 2025-03-17 ENCOUNTER — CARDPULM VISIT (OUTPATIENT)
Dept: CARDIAC REHAB | Facility: HOSPITAL | Age: 85
End: 2025-03-17
Attending: INTERNAL MEDICINE
Payer: MEDICARE

## 2025-03-17 PROCEDURE — 93798 PHYS/QHP OP CAR RHAB W/ECG: CPT

## 2025-03-18 ENCOUNTER — APPOINTMENT (OUTPATIENT)
Dept: CARDIAC REHAB | Facility: HOSPITAL | Age: 85
End: 2025-03-18
Attending: INTERNAL MEDICINE
Payer: MEDICARE

## 2025-03-20 ENCOUNTER — APPOINTMENT (OUTPATIENT)
Dept: CARDIAC REHAB | Facility: HOSPITAL | Age: 85
End: 2025-03-20
Attending: INTERNAL MEDICINE
Payer: MEDICARE

## 2025-03-20 PROCEDURE — 93798 PHYS/QHP OP CAR RHAB W/ECG: CPT

## 2025-03-24 ENCOUNTER — CARDPULM VISIT (OUTPATIENT)
Dept: CARDIAC REHAB | Facility: HOSPITAL | Age: 85
End: 2025-03-24
Attending: INTERNAL MEDICINE
Payer: MEDICARE

## 2025-03-24 PROCEDURE — 93798 PHYS/QHP OP CAR RHAB W/ECG: CPT

## 2025-03-25 ENCOUNTER — APPOINTMENT (OUTPATIENT)
Dept: CARDIAC REHAB | Facility: HOSPITAL | Age: 85
End: 2025-03-25
Attending: INTERNAL MEDICINE
Payer: MEDICARE

## 2025-03-27 ENCOUNTER — APPOINTMENT (OUTPATIENT)
Dept: CARDIAC REHAB | Facility: HOSPITAL | Age: 85
End: 2025-03-27
Attending: INTERNAL MEDICINE
Payer: MEDICARE

## 2025-03-27 PROCEDURE — 93798 PHYS/QHP OP CAR RHAB W/ECG: CPT

## 2025-03-28 PROBLEM — I21.19 ST ELEVATION MYOCARDIAL INFARCTION (STEMI) INVOLVING OTHER CORONARY ARTERY OF INFERIOR WALL (HCC): Status: RESOLVED | Noted: 2025-01-05 | Resolved: 2025-03-28

## 2025-03-28 PROBLEM — I25.2 HISTORY OF ST ELEVATION MYOCARDIAL INFARCTION (STEMI): Status: ACTIVE | Noted: 2025-03-28

## 2025-03-31 ENCOUNTER — CARDPULM VISIT (OUTPATIENT)
Dept: CARDIAC REHAB | Facility: HOSPITAL | Age: 85
End: 2025-03-31
Attending: INTERNAL MEDICINE
Payer: MEDICARE

## 2025-03-31 PROCEDURE — 93798 PHYS/QHP OP CAR RHAB W/ECG: CPT

## 2025-04-01 ENCOUNTER — OFFICE VISIT (OUTPATIENT)
Facility: CLINIC | Age: 85
End: 2025-04-01
Payer: MEDICARE

## 2025-04-01 ENCOUNTER — HOSPITAL ENCOUNTER (OUTPATIENT)
Age: 85
Discharge: STILL ADMITTED OR ADMINISTRATIVE | End: 2025-04-01
Payer: MEDICARE

## 2025-04-01 ENCOUNTER — APPOINTMENT (OUTPATIENT)
Dept: CARDIAC REHAB | Facility: HOSPITAL | Age: 85
End: 2025-04-01
Attending: INTERNAL MEDICINE
Payer: MEDICARE

## 2025-04-01 VITALS
WEIGHT: 134 LBS | DIASTOLIC BLOOD PRESSURE: 62 MMHG | BODY MASS INDEX: 20.31 KG/M2 | HEART RATE: 53 BPM | HEIGHT: 68 IN | OXYGEN SATURATION: 98 % | SYSTOLIC BLOOD PRESSURE: 108 MMHG

## 2025-04-01 DIAGNOSIS — I10 ESSENTIAL HYPERTENSION: ICD-10-CM

## 2025-04-01 DIAGNOSIS — Z72.0 TOBACCO USE: ICD-10-CM

## 2025-04-01 DIAGNOSIS — Z95.5 S/P CORONARY ARTERY STENT PLACEMENT: ICD-10-CM

## 2025-04-01 DIAGNOSIS — Z00.00 ENCOUNTER FOR ANNUAL HEALTH EXAMINATION: Primary | ICD-10-CM

## 2025-04-01 DIAGNOSIS — R91.8 PULMONARY NODULES: ICD-10-CM

## 2025-04-01 DIAGNOSIS — I49.01 VENTRICULAR FIBRILLATION (HCC): ICD-10-CM

## 2025-04-01 DIAGNOSIS — I25.2 HISTORY OF ST ELEVATION MYOCARDIAL INFARCTION (STEMI): ICD-10-CM

## 2025-04-01 DIAGNOSIS — Z87.442 HISTORY OF NEPHROLITHIASIS: ICD-10-CM

## 2025-04-01 DIAGNOSIS — K31.89 DUODENAL NODULE: ICD-10-CM

## 2025-04-01 DIAGNOSIS — Z87.19 HISTORY OF SMALL BOWEL OBSTRUCTION: ICD-10-CM

## 2025-04-01 DIAGNOSIS — J43.2 CENTRILOBULAR EMPHYSEMA (HCC): ICD-10-CM

## 2025-04-01 DIAGNOSIS — I25.10 ARTERIOSCLEROSIS OF CORONARY ARTERY: ICD-10-CM

## 2025-04-01 PROCEDURE — 96160 PT-FOCUSED HLTH RISK ASSMT: CPT | Performed by: FAMILY MEDICINE

## 2025-04-01 PROCEDURE — G0439 PPPS, SUBSEQ VISIT: HCPCS | Performed by: FAMILY MEDICINE

## 2025-04-01 PROCEDURE — 1170F FXNL STATUS ASSESSED: CPT | Performed by: FAMILY MEDICINE

## 2025-04-01 PROCEDURE — 1159F MED LIST DOCD IN RCRD: CPT | Performed by: FAMILY MEDICINE

## 2025-04-01 PROCEDURE — 1126F AMNT PAIN NOTED NONE PRSNT: CPT | Performed by: FAMILY MEDICINE

## 2025-04-01 PROCEDURE — 99214 OFFICE O/P EST MOD 30 MIN: CPT | Performed by: FAMILY MEDICINE

## 2025-04-01 PROCEDURE — 1160F RVW MEDS BY RX/DR IN RCRD: CPT | Performed by: FAMILY MEDICINE

## 2025-04-01 NOTE — PROGRESS NOTES
Subjective:   Robert Dyer is a 84 year old male who presents for a MA AHA (Medicare Advantage Annual Health Assessment) and scheduled follow up of multiple significant but stable problems.     Still in cardiac rehab. Has cardiology f/u on 4/10/25. Feeling well.     Last cig was 3/5/25.     Taking meds as prescribed.     Appetite has remained low although has gained some weight back. Back to work which feels good.    Last colonoscopy: A few years ago at [facility] and normal per patient. Never received records.   Last PSA: years ago and normal.   Last low-dose CT chest: 05/2024 showing scattered granulomas and severe emphysema  Abdominal ultrasound for AAA screening: Completed at [facility] per patient.   Diet & Exercise: See above  Vaccines: Unsure if he has received pneumococcal or shingles vaccines in the past, but declines today.    History/Other:   Fall Risk Assessment:   He has been screened for Falls and is High Risk. Fall Prevention information provided to patient in After Visit Summary.    Do you feel unsteady when standing or walking?: No  Do you worry about falling?: No  Have you fallen in the past year?: Yes  How many times have you fallen?: 4  Were you injured?: No     Cognitive Assessment:   He had a completely normal cognitive assessment - see flowsheet entries     Functional Ability/Status:   Robert Dyer has some abnormal functions as listed below:  He has Hearing problems based on screening of functional status.      Depression Screening (PHQ):  PHQ-2 SCORE: 0  , done 4/1/2025             Advanced Directives:   He does NOT have a Living Will. [Do you have a living will?: No]  He does NOT have a Power of  for Health Care. [Do you have a healthcare power of ?: No]  Not discussed      Patient Active Problem List   Diagnosis    Essential hypertension    Tobacco dependence    History of nephrolithiasis    Centrilobular emphysema (HCC)    Pulmonary nodules    History of small bowel  obstruction    Duodenal nodule    Arteriosclerosis of coronary artery    S/P coronary artery stent placement    History of ST elevation myocardial infarction (STEMI)     Allergies:  He is allergic to eggs or egg-derived products.    Current Medications:  Outpatient Medications Marked as Taking for the 4/1/25 encounter (Office Visit) with Hadley Marcus DO   Medication Sig    benzonatate 200 MG Oral Cap Take 1 capsule (200 mg total) by mouth 3 (three) times daily as needed for cough.    PLAVIX 75 MG Oral Tab Plavix 75 mg tablet, [RxNorm: 981966]    finasteride 5 MG Oral Tab Take 1 tablet (5 mg total) by mouth daily.    tamsulosin 0.4 MG Oral Cap Take 1 capsule (0.4 mg total) by mouth daily.    aspirin 81 MG Oral Tab EC Take 1 tablet (81 mg total) by mouth daily.    metoprolol succinate ER 25 MG Oral Tablet 24 Hr Take 1 tablet (25 mg total) by mouth Daily Beta Blocker.    rosuvastatin 40 MG Oral Tab Take 1 tablet (40 mg total) by mouth nightly.    chlorproMAZINE 25 MG Oral Tab Take 1 tablet (25 mg total) by mouth every 6 (six) hours as needed.    famotidine 20 MG Oral Tab Take 1 tablet (20 mg total) by mouth 2 (two) times daily.    albuterol 108 (90 Base) MCG/ACT Inhalation Aero Soln Inhale 2 puffs into the lungs every 6 (six) hours as needed for Wheezing.    fluticasone propionate 50 MCG/ACT Nasal Suspension 2 sprays by Nasal route daily.       Medical History:  He  has a past medical history of Allergic rhinitis, Centrilobular emphysema ([facility]) (02/17/2020), Essential hypertension, History of nephrolithiasis, History of percutaneous coronary intervention, STEMI (ST elevation myocardial infarction) ([facility]) (01/2025), Tobacco dependence (1954), and Ventricular fibrillation ([facility]) (01/2025).  Surgical History:  He  has no past surgical history on file.   Family History:  His family history includes Cancer in his father.  Social History:  He  reports that he has been smoking cigarettes. He started smoking about 71 years  ago. He has a 213.7 pack-year smoking history. He has never used smokeless tobacco. He reports that he does not currently use alcohol. He reports that he does not use drugs.    Tobacco:  Social History     Tobacco Use   Smoking Status Every Day    Current packs/day: 3.00    Average packs/day: 3.0 packs/day for 71.2 years (213.7 ttl pk-yrs)    Types: Cigarettes    Start date: 1954   Smokeless Tobacco Never   Tobacco Comments    3 PPD smoker since 17yoa.     E-Cigarettes/Vaping       Questions Responses    E-Cigarette Use Never User               CAGE Alcohol Screen:   CAGE screening score of 0 on 4/1/2025, showing low risk of alcohol abuse.      Patient Care Team:  Hadley Marcus DO as PCP - General (Family Medicine)    Review of Systems  GENERAL: feels well otherwise  SKIN: denies any unusual skin lesions  EYES: denies blurred vision or double vision  HEENT: denies nasal congestion, sinus pain or ST  LUNGS: denies shortness of breath with exertion  CARDIOVASCULAR: denies chest pain on exertion  GI: denies abdominal pain, denies heartburn  :  no complaint of urinary incontinence  MUSCULOSKELETAL: denies back pain  NEURO: denies headaches  PSYCHE: denies depression or anxiety  HEMATOLOGIC: denies hx of anemia  ENDOCRINE: denies thyroid history  ALL/ASTHMA: denies hx of allergy or asthma    Objective:   Physical Exam  General Appearance:  Alert, cooperative, no distress, appears stated age   Head:  Normocephalic, without obvious abnormality, atraumatic   Eyes:  PERRL, conjunctiva/corneas clear, EOM's intact, both eyes   Ears:  Normal TM's and external ear canals, both ears   Nose: Nares normal, septum midline, mucosa normal, no drainage or sinus tenderness   Throat: Lips, mucosa, and tongue normal; teeth and gums normal   Neck: Supple, symmetrical, trachea midline, no adenopathy, thyroid: not enlarged, symmetric, no tenderness/mass/nodules, no carotid bruit or JVD   Back:   Symmetric, no curvature, ROM normal, no  CVA tenderness   Lungs:   Clear to auscultation bilaterally, respirations unlabored   Chest Wall:  No tenderness or deformity   Heart:  Regular rate and rhythm, S1, S2 normal, no murmur, rub or gallop   Abdomen:   Soft, non-tender, bowel sounds active all four quadrants,  no masses, no organomegaly           Extremities: Extremities normal, atraumatic, no cyanosis or edema   Pulses: 2+ and symmetric   Skin: Skin color, texture, turgor normal, no rashes or lesions   Lymph nodes: Cervical, supraclavicular, and axillary nodes normal   Neurologic: Normal     /62   Pulse 53   Ht 5' 8\" (1.727 m)   Wt 134 lb (60.8 kg)   SpO2 98%   BMI 20.37 kg/m²  Estimated body mass index is 20.37 kg/m² as calculated from the following:    Height as of this encounter: 5' 8\" (1.727 m).    Weight as of this encounter: 134 lb (60.8 kg).    Medicare Hearing Assessment:   Hearing Screening    Screening Method: Finger Rub  Finger Rub Result: Pass         Visual Acuity:   Right Eye Visual Acuity: Corrected Right Eye Chart Acuity: 20/50   Left Eye Visual Acuity: Corrected Left Eye Chart Acuity: 20/50   Both Eyes Visual Acuity: Corrected Both Eyes Chart Acuity: 20/50   Able To Tolerate Visual Acuity: Yes        Assessment & Plan:   Robert Dyer is a 84 year old male who presents for a Medicare Assessment.     1. Encounter for annual health examination (Primary)  2. History of ST elevation myocardial infarction (STEMI)  3. S/P coronary artery stent placement  4. Arteriosclerosis of coronary artery  5. Ventricular fibrillation (HCC)  6. Essential hypertension  7. Centrilobular emphysema (HCC)  -     CT LUNG LD SCREENING(CPT=71271); Future; Expected date: 04/01/2025  8. Tobacco use  -     CT LUNG LD SCREENING(CPT=71271); Future; Expected date: 04/01/2025  9. Pulmonary nodules  -     CT LUNG LD SCREENING(CPT=71271); Future; Expected date: 04/01/2025  10. Duodenal nodule  -     Gastro Referral - In Network  11. History of small bowel  obstruction  12. History of nephrolithiasis    -Hx of STEMI s/p stent placement: In cardiac rehab. Per cardiology.   -Coronary artery atherosclerosis: Plan as above.  -Hx of V-fib: Per cardiology.  -HTN: Well-controlled. CPM.  -Emphysema & Prior tobacco use & Pulmonary nodules: Due for repeat LDCT Lung in 05/2025.   -Duodenal nodule: To f/u with GI Dr. Hood.  -Hx of SBO: No current concerns.   -Hx of nephrolithiasis: No current concerns.   -RTC in 6 months for f/u.     The patient indicates understanding of these issues and agrees to the plan.  Reinforced healthy diet, lifestyle, and exercise.      No follow-ups on file.     Hadley Marcus DO, 4/1/2025     Supplementary Documentation:   General Health:  In the past six months, have you lost more than 10 pounds without trying?: 1 - Yes  Has your appetite been poor?: Yes  Type of Diet: Low Salt  How does the patient maintain a good energy level?: Appropriate Exercise  How would you describe your daily physical activity?: Moderate  How would you describe your current health state?: Good  How do you maintain positive mental well-being?: Social Interaction  On a scale of 0 to 10, with 0 being no pain and 10 being severe pain, what is your pain level?: 0 - (None)  In the past six months, have you experienced urine leakage?: 0-No  At any time do you feel concerned for the safety/well-being of yourself and/or your children, in your home or elsewhere?: No  Have you had any immunizations at another office such as Influenza, Hepatitis B, Tetanus, or Pneumococcal?: No    Health Maintenance   Topic Date Due    Pneumococcal Vaccine: 50+ Years (1 of 2 - PCV) Never done    Zoster Vaccines (1 of 2) Never done    COVID-19 Vaccine (3 - 2024-25 season) 09/01/2024    Annual Well Visit  01/01/2025    Tobacco Cessation Counseling  01/01/2025    Influenza Vaccine (Season Ended) 10/01/2025    Annual Depression Screening  Completed    Fall Risk Screening (Annual)  Completed    Meningococcal B  Vaccine  Aged Out

## 2025-04-03 ENCOUNTER — APPOINTMENT (OUTPATIENT)
Dept: CARDIAC REHAB | Facility: HOSPITAL | Age: 85
End: 2025-04-03
Attending: INTERNAL MEDICINE
Payer: MEDICARE

## 2025-04-03 PROCEDURE — 93798 PHYS/QHP OP CAR RHAB W/ECG: CPT

## 2025-04-07 ENCOUNTER — CARDPULM VISIT (OUTPATIENT)
Dept: CARDIAC REHAB | Facility: HOSPITAL | Age: 85
End: 2025-04-07
Attending: INTERNAL MEDICINE
Payer: MEDICARE

## 2025-04-07 PROCEDURE — 93798 PHYS/QHP OP CAR RHAB W/ECG: CPT

## 2025-04-08 ENCOUNTER — APPOINTMENT (OUTPATIENT)
Dept: CARDIAC REHAB | Facility: HOSPITAL | Age: 85
End: 2025-04-08
Attending: INTERNAL MEDICINE
Payer: MEDICARE

## 2025-04-10 ENCOUNTER — CARDPULM VISIT (OUTPATIENT)
Dept: CARDIAC REHAB | Facility: HOSPITAL | Age: 85
End: 2025-04-10
Attending: INTERNAL MEDICINE
Payer: MEDICARE

## 2025-04-10 PROCEDURE — 93798 PHYS/QHP OP CAR RHAB W/ECG: CPT

## 2025-04-14 ENCOUNTER — CARDPULM VISIT (OUTPATIENT)
Dept: CARDIAC REHAB | Facility: HOSPITAL | Age: 85
End: 2025-04-14
Attending: INTERNAL MEDICINE
Payer: MEDICARE

## 2025-04-14 PROCEDURE — 93798 PHYS/QHP OP CAR RHAB W/ECG: CPT

## 2025-04-15 ENCOUNTER — APPOINTMENT (OUTPATIENT)
Dept: CARDIAC REHAB | Facility: HOSPITAL | Age: 85
End: 2025-04-15
Attending: INTERNAL MEDICINE
Payer: MEDICARE

## 2025-04-16 ENCOUNTER — TELEPHONE (OUTPATIENT)
Dept: SURGERY | Facility: CLINIC | Age: 85
End: 2025-04-16

## 2025-04-16 DIAGNOSIS — N39.0 RECURRENT UTI: Primary | ICD-10-CM

## 2025-04-16 NOTE — TELEPHONE ENCOUNTER
Called patient's wife, verified name and  of patient. Wife says today Robert complained of burning and cloudy urine, no constipation. No other symptoms. I let wife know we will place an order for urine test and he should go to the lab to provider sample,  and once we receive the results we will contact her. Advised wife to encourage patient to consume enough water.   Wife agreed, verbalized understandings and has no further questions.

## 2025-04-17 ENCOUNTER — LAB ENCOUNTER (OUTPATIENT)
Dept: LAB | Facility: HOSPITAL | Age: 85
End: 2025-04-17
Payer: MEDICARE

## 2025-04-17 ENCOUNTER — APPOINTMENT (OUTPATIENT)
Dept: CARDIAC REHAB | Facility: HOSPITAL | Age: 85
End: 2025-04-17
Attending: INTERNAL MEDICINE
Payer: MEDICARE

## 2025-04-17 DIAGNOSIS — N39.0 RECURRENT UTI: ICD-10-CM

## 2025-04-17 LAB
BILIRUB UR QL: NEGATIVE
COLOR UR: YELLOW
GLUCOSE UR-MCNC: NORMAL MG/DL
KETONES UR-MCNC: NEGATIVE MG/DL
LEUKOCYTE ESTERASE UR QL STRIP.AUTO: 500
PH UR: 6 [PH] (ref 5–8)
PROT UR-MCNC: 200 MG/DL
RBC #/AREA URNS AUTO: >10 /HPF
SP GR UR STRIP: 1.03 (ref 1–1.03)
UROBILINOGEN UR STRIP-ACNC: NORMAL
WBC #/AREA URNS AUTO: >50 /HPF

## 2025-04-17 PROCEDURE — 93798 PHYS/QHP OP CAR RHAB W/ECG: CPT

## 2025-04-17 PROCEDURE — 87186 SC STD MICRODIL/AGAR DIL: CPT

## 2025-04-17 PROCEDURE — 87086 URINE CULTURE/COLONY COUNT: CPT

## 2025-04-17 PROCEDURE — 87077 CULTURE AEROBIC IDENTIFY: CPT

## 2025-04-17 PROCEDURE — 81001 URINALYSIS AUTO W/SCOPE: CPT

## 2025-04-18 ENCOUNTER — TELEPHONE (OUTPATIENT)
Dept: SURGERY | Facility: CLINIC | Age: 85
End: 2025-04-18

## 2025-04-21 ENCOUNTER — CARDPULM VISIT (OUTPATIENT)
Dept: CARDIAC REHAB | Facility: HOSPITAL | Age: 85
End: 2025-04-21
Attending: INTERNAL MEDICINE
Payer: MEDICARE

## 2025-04-21 PROCEDURE — 93798 PHYS/QHP OP CAR RHAB W/ECG: CPT

## 2025-04-21 NOTE — TELEPHONE ENCOUNTER
I called pt's spouse back who has a signed KELY on file and i informed her of the results and abx instructions as stated below from  and it was also sent to pt by my chart. I told her that the abx was sent to pt's [facility] in [place] and he should call to report if his symptoms do not resolve after 2 days of the abx and he should drink plenty of fluids and make sure to finish all the tabs. She verbalized understanding and ensure pt's compliance.          Result Notes     Madelyn Luis, APRN  4/21/2025 10:57 AM CDT Back to Top      [facility] sent. Bactrim sent to patient's pharmacy

## 2025-04-21 NOTE — TELEPHONE ENCOUNTER
Per spouse test results came back positive and asking if a prescription will be sent. Please advise

## 2025-04-22 ENCOUNTER — APPOINTMENT (OUTPATIENT)
Dept: CARDIAC REHAB | Facility: HOSPITAL | Age: 85
End: 2025-04-22
Attending: INTERNAL MEDICINE
Payer: MEDICARE

## 2025-04-24 ENCOUNTER — APPOINTMENT (OUTPATIENT)
Dept: CARDIAC REHAB | Facility: HOSPITAL | Age: 85
End: 2025-04-24
Attending: INTERNAL MEDICINE
Payer: MEDICARE

## 2025-04-24 PROCEDURE — 93798 PHYS/QHP OP CAR RHAB W/ECG: CPT

## 2025-04-28 ENCOUNTER — CARDPULM VISIT (OUTPATIENT)
Dept: CARDIAC REHAB | Facility: HOSPITAL | Age: 85
End: 2025-04-28
Attending: INTERNAL MEDICINE
Payer: MEDICARE

## 2025-04-28 ENCOUNTER — TELEPHONE (OUTPATIENT)
Dept: SURGERY | Facility: CLINIC | Age: 85
End: 2025-04-28

## 2025-04-28 PROCEDURE — 93798 PHYS/QHP OP CAR RHAB W/ECG: CPT

## 2025-04-28 NOTE — TELEPHONE ENCOUNTER
I s/w pt's spouse and determined that pt finished his last abx yesterday and she sees that his urine is still a little cloudy and has sediment in it and she is concerned that this may be because that balloon from the cath broke off and is still in pt. I told her that I do not think this is the case as it may have been a defective cath and the balloon deflated on its' own and then fell out of him. I asked her if pt has a HX of kidney stones and she confirmed. I told her that the best thing for the cloudy urine and the sediment is to have pt drink lots more water ane lemonade. I asked if pt has any UTI symptoms such as , pain and burning with urination, fever and chills, hematuria and she denied. I asked if he is urinating OK and she confirmed. I told her to monitor for any of the symptoms I mentioned and call  to report. She verbalized understanding and compliance and was thankful for the call.

## 2025-04-28 NOTE — TELEPHONE ENCOUNTER
Per spouse patient has completed antibiotic prescribed but urine is still cloudy, also states there appears to be sediment in urine at times, requesting to speak to RN. Please call thank you.

## 2025-04-29 ENCOUNTER — APPOINTMENT (OUTPATIENT)
Dept: CARDIAC REHAB | Facility: HOSPITAL | Age: 85
End: 2025-04-29
Attending: INTERNAL MEDICINE
Payer: MEDICARE

## 2025-05-01 ENCOUNTER — APPOINTMENT (OUTPATIENT)
Dept: CARDIAC REHAB | Facility: HOSPITAL | Age: 85
End: 2025-05-01
Attending: INTERNAL MEDICINE
Payer: MEDICARE

## 2025-05-01 ENCOUNTER — LAB ENCOUNTER (OUTPATIENT)
Dept: LAB | Facility: HOSPITAL | Age: 85
End: 2025-05-01
Attending: INTERNAL MEDICINE
Payer: MEDICARE

## 2025-05-01 ENCOUNTER — TELEPHONE (OUTPATIENT)
Dept: SURGERY | Facility: CLINIC | Age: 85
End: 2025-05-01

## 2025-05-01 DIAGNOSIS — R30.0 DYSURIA: Primary | ICD-10-CM

## 2025-05-01 DIAGNOSIS — R30.0 DYSURIA: ICD-10-CM

## 2025-05-01 PROCEDURE — 93798 PHYS/QHP OP CAR RHAB W/ECG: CPT

## 2025-05-01 PROCEDURE — 87086 URINE CULTURE/COLONY COUNT: CPT

## 2025-05-01 RX ORDER — CIPROFLOXACIN 500 MG/1
500 TABLET, FILM COATED ORAL EVERY 12 HOURS
Qty: 14 TABLET | Refills: 0 | Status: SHIPPED | OUTPATIENT
Start: 2025-05-01 | End: 2025-05-08

## 2025-05-01 NOTE — TELEPHONE ENCOUNTER
Wife calling stating patient had finish taking antibiotics on Sunday but patient  lately is  experiencing  burning sensation during urination.Please advise

## 2025-05-01 NOTE — TELEPHONE ENCOUNTER
I s/w [facility] and consulted him on pt's culture results and I informed him that pt is still c/o dysuria and completed a 7 day course of Bactrim DS and he gave V/O that pt can try Cipro 500 mg if he is still having UTI symptoms.     I called pt and I s/w his spouse Joy and I determined that aside from dysuria and cloudy urine pt has no other UTI symptoms. Spouse again brought up that she is still wondering about the balloon that she thinks may still be in pt. I again explained that unless pt cut the catheter and the tip of the cath where the balloon is wound still be there. She states that pt did  not cut the cath and she noticed that the balloon was not there. I explained that the balloon must have deflated inside the pt and that is why it fell out. I explained that because I did not witness the actual catheter I cannot say for sure though. I told spouse that pt was supposed to have a cysto procedure in Jan and never reschd that appt and I suggested we do that and this way if the doctor sees a piece of the balloon in his bladder he can remove it. I then went back to the pt's uti symptoms and told spouse that I can place an order for pt to repeat the urine culture test and I can also send abx to the pharmacy for pt to start after giving the urine sample at the lab. I instructed her to have the pt finish all the abx and drink plenty of fluids and also call on monday if symptoms persist and we will have the final culture results by then to tell if we need to change the abx. I also arranged an appt for the office cysto for Fri. 5/16 at 8:30 am and asked that pt arrive at 8 am. I also explained the procedure in detail and told her that pt can eat and drink and take all of his prescription meds including the blood thinners. Spouse verbalized understanding and compliance.

## 2025-05-05 ENCOUNTER — CARDPULM VISIT (OUTPATIENT)
Dept: CARDIAC REHAB | Facility: HOSPITAL | Age: 85
End: 2025-05-05
Attending: INTERNAL MEDICINE
Payer: MEDICARE

## 2025-05-05 PROCEDURE — 93798 PHYS/QHP OP CAR RHAB W/ECG: CPT

## 2025-05-05 NOTE — TELEPHONE ENCOUNTER
Called patient's wife, verified with her name and  of patient. I let wife know of the urine test results, also wife is aware to stop the antibiotics. Wife says the symptoms are gone and he has no more burning. I let wife know to monitor for any changes and let us know if they have questions/concerns.   Wife agreed, verbalized understandings and has no further questions.

## 2025-05-06 ENCOUNTER — APPOINTMENT (OUTPATIENT)
Dept: CARDIAC REHAB | Facility: HOSPITAL | Age: 85
End: 2025-05-06
Attending: INTERNAL MEDICINE
Payer: MEDICARE

## 2025-05-08 ENCOUNTER — APPOINTMENT (OUTPATIENT)
Dept: CARDIAC REHAB | Facility: HOSPITAL | Age: 85
End: 2025-05-08
Attending: INTERNAL MEDICINE
Payer: MEDICARE

## 2025-05-08 PROCEDURE — 93798 PHYS/QHP OP CAR RHAB W/ECG: CPT

## 2025-05-09 ENCOUNTER — TELEPHONE (OUTPATIENT)
Dept: SURGERY | Facility: CLINIC | Age: 85
End: 2025-05-09

## 2025-05-12 ENCOUNTER — TELEPHONE (OUTPATIENT)
Dept: SURGERY | Facility: CLINIC | Age: 85
End: 2025-05-12

## 2025-05-12 NOTE — TELEPHONE ENCOUNTER
Pt's wife called.  Pt is scheduled for a cysto on Friday 5-16-25.  Checking on prior authorization.  Please call

## 2025-05-13 ENCOUNTER — APPOINTMENT (OUTPATIENT)
Dept: CARDIAC REHAB | Facility: HOSPITAL | Age: 85
End: 2025-05-13
Attending: INTERNAL MEDICINE
Payer: MEDICARE

## 2025-05-13 NOTE — TELEPHONE ENCOUNTER
I called pt Stefan guido regarding prior auth, with the help of  ELENA CPT 00382, 79917 and dx code gross hematuria no prior auth is needed, ref # [id number].   I called pt wife, Joy verified name/ of pt and informed her no prior auth is needed. Wife asked about pt plavix and asa 81mg I informed pt that I read through notes/telephone calls no orders to stop blood thinners so pt is to keep taking, location of [facility] given (wife states they have been here before) pt will arrive at 8am on 25 as scheduled. No further questions call ended.

## 2025-05-13 NOTE — TELEPHONE ENCOUNTER
Per patient's spouse calling to check in status of prior auth for patient's cysto on 5/16. Per patient's wife also asking what the downtime is for after patient's cysto. Please call back.

## 2025-05-15 ENCOUNTER — APPOINTMENT (OUTPATIENT)
Dept: CARDIAC REHAB | Facility: HOSPITAL | Age: 85
End: 2025-05-15
Attending: INTERNAL MEDICINE
Payer: MEDICARE

## 2025-05-15 PROCEDURE — 93798 PHYS/QHP OP CAR RHAB W/ECG: CPT

## 2025-05-16 ENCOUNTER — TELEPHONE (OUTPATIENT)
Dept: SURGERY | Facility: CLINIC | Age: 85
End: 2025-05-16

## 2025-05-16 ENCOUNTER — PROCEDURE (OUTPATIENT)
Dept: SURGERY | Facility: CLINIC | Age: 85
End: 2025-05-16

## 2025-05-16 VITALS
WEIGHT: 134 LBS | HEIGHT: 69 IN | BODY MASS INDEX: 19.85 KG/M2 | DIASTOLIC BLOOD PRESSURE: 77 MMHG | SYSTOLIC BLOOD PRESSURE: 157 MMHG | HEART RATE: 61 BPM

## 2025-05-16 DIAGNOSIS — R31.0 GROSS HEMATURIA: Primary | ICD-10-CM

## 2025-05-16 DIAGNOSIS — N39.0 RECURRENT UTI: ICD-10-CM

## 2025-05-16 PROCEDURE — 1159F MED LIST DOCD IN RCRD: CPT | Performed by: UROLOGY

## 2025-05-16 PROCEDURE — 52000 CYSTOURETHROSCOPY: CPT | Performed by: UROLOGY

## 2025-05-16 PROCEDURE — 99213 OFFICE O/P EST LOW 20 MIN: CPT | Performed by: UROLOGY

## 2025-05-16 RX ORDER — CIPROFLOXACIN 500 MG/1
500 TABLET, FILM COATED ORAL ONCE
Status: COMPLETED | OUTPATIENT
Start: 2025-05-16 | End: 2025-05-16

## 2025-05-16 RX ADMIN — CIPROFLOXACIN 500 MG: 500 TABLET, FILM COATED ORAL at 10:49:00

## 2025-05-16 NOTE — TELEPHONE ENCOUNTER
Pt's wife called.  Pt had a cystoscopy today 5-16-25.  Pt's urine is dark red.  Aware the office closed at 4:00 pm on Friday.  Call transferred to the  to page the doctor on call.

## 2025-05-16 NOTE — PROGRESS NOTES
Robert Dyer is a 84 year old male.    HPI:     Chief Complaint   Patient presents with    Procedure     Patient is here for a cystoscopy with possible bladder biopsy.     84-year-old male accompanied by his wife who is a retired nurse for office cystoscopy.  In January of this year he was admitted to the emergency department due to complete heart block and STEMI and underwent emergency cardiac catheterization and stent placements.  Noted to be in urinary retention with 1000 mL in his bladder.  Mercado catheter was inserted.  Urine culture was negative.  Upon Mercado insertion urine was noted to be maroon-colored.  He subsequently passed a voiding trial.  Nurse visit 2025 showed a postvoid residual of 12 mL.  He is currently on maximal medical therapy with tamsulosin and finasteride.  Remains on clopidogrel and low-dose aspirin.  Denies any bothersome urination symptoms.  Denies any gross hematuria.  Subsequent to his consultation diagnosed with a Klebsiella UTI.  No UTI symptoms at present.  Evaluation included a CT urogram 2025 which was unremarkable, negative urine for cytology.   he has a significant smoking history.  Most recent UTI 2025 growing Klebsiella pneumonia.          HISTORY:  Past Medical History[1]   Past Surgical History[2]   Family History[3]   Social History: Short Social Hx on File[4]     Medications (Active prior to today's visit):  Current Medications[5]    Allergies:  Allergies[6]      ROS:       PHYSICAL EXAM:   Robert Dyer  : 1940  Referring Physician: No ref. provider found     Chief Complaint   Patient presents with    Procedure     Patient is here for a cystoscopy with possible bladder biopsy.             CYSTOSCOPY    Anesthesia:  2% lidocaine gel    Urethra: Normal  Prostate / Pelvic: Abnormal trilobar prostate enlargement 4.5 cm prostatic urethral length  Bladder: Abnormal there is a stone within the bladder curvilinear appearing suggestive of  the stone that would form on the surface of the catheter although his Mercado catheter was in for less than 2 weeks.    Stone measures approximately 3 cm in length and a centimeter and a half in width.  Pictures taken and submitted into the chart.  U.O's: Normal  Trabeculation: grade 3-4      POST CYSTOSCOPY MEDICATIONS: sample one tablet Cipro 500mg given to patient    DIAGNOSIS:     PLAN: s reviewed findings at length with patient.ee below       ASSESSMENT/PLAN:   Assessment   Encounter Diagnoses   Name Primary?    Gross hematuria Yes    Recurrent UTI        Reviewed findings at length with patient.  Recommended cardiac clearance for anesthesia as well as to be off at least his clopidogrel in anticipation of removal of the bladder stones under anesthesia.  My office will reach out to his cardiologist (Dr. Barr) for clearance and timing of surgery.  At this point, this procedure is not urgent given that the patient is asymptomatic.    He will continue on tamsulosin and finasteride in the meantime.         Orders This Visit:  No orders of the defined types were placed in this encounter.      Meds This Visit:  Requested Prescriptions     Pending Prescriptions Disp Refills    ciprofloxacin (Cipro) tab 500 mg         Imaging & Referrals:  None     5/16/2025  Zoe Henderson MD               [1]   Past Medical History:   Allergic rhinitis    Centrilobular emphysema (HCC)    Essential hypertension    History of nephrolithiasis    History of percutaneous coronary intervention    STEMI (ST elevation myocardial infarction) (HCC)    Tobacco dependence    1 PPD at 14yoa, and 3 PPD since 17yoa.     Ventricular fibrillation (HCC)   [2] No past surgical history on file.  [3]   Family History  Problem Relation Age of Onset    Cancer Father    [4]   Social History  Socioeconomic History    Marital status:    Tobacco Use    Smoking status: Every Day     Current packs/day: 3.00     Average packs/day: 3.0 packs/day for 71.4  years (214.1 ttl pk-yrs)     Types: Cigarettes     Start date: 1954    Smokeless tobacco: Never    Tobacco comments:     3 PPD smoker since 17yoa.   Vaping Use    Vaping status: Never Used   Substance and Sexual Activity    Alcohol use: Not Currently    Drug use: Never    Sexual activity: Not Currently     Social Drivers of Health     Food Insecurity: No Food Insecurity (4/1/2025)    NCSS - Food Insecurity     Worried About Running Out of Food in the Last Year: No     Ran Out of Food in the Last Year: No   Transportation Needs: No Transportation Needs (4/1/2025)    NCSS - Transportation     Lack of Transportation: No   Housing Stability: Not At Risk (4/1/2025)    NCSS - Housing/Utilities     Has Housing: Yes     Worried About Losing Housing: No     Unable to Get Utilities: No   [5]   Current Outpatient Medications   Medication Sig Dispense Refill    benzonatate 200 MG Oral Cap Take 1 capsule (200 mg total) by mouth 3 (three) times daily as needed for cough. 30 capsule 0    PLAVIX 75 MG Oral Tab Plavix 75 mg tablet, [RxNorm: 709366]      finasteride 5 MG Oral Tab Take 1 tablet (5 mg total) by mouth daily. 90 tablet 3    tamsulosin 0.4 MG Oral Cap Take 1 capsule (0.4 mg total) by mouth daily. 90 capsule 3    aspirin 81 MG Oral Tab EC Take 1 tablet (81 mg total) by mouth daily. 30 tablet 0    metoprolol succinate ER 25 MG Oral Tablet 24 Hr Take 1 tablet (25 mg total) by mouth Daily Beta Blocker. 30 tablet 0    rosuvastatin 40 MG Oral Tab Take 1 tablet (40 mg total) by mouth nightly. 30 tablet 0    chlorproMAZINE 25 MG Oral Tab Take 1 tablet (25 mg total) by mouth every 6 (six) hours as needed. 30 tablet 0    famotidine 20 MG Oral Tab Take 1 tablet (20 mg total) by mouth 2 (two) times daily. 90 tablet 0    albuterol 108 (90 Base) MCG/ACT Inhalation Aero Soln Inhale 2 puffs into the lungs every 6 (six) hours as needed for Wheezing. 1 each 1    fluticasone propionate 50 MCG/ACT Nasal Suspension 2 sprays by Nasal route  daily. 16 g 5   [6]   Allergies  Allergen Reactions    Eggs Or Egg-Derived Products RASH

## 2025-05-16 NOTE — TELEPHONE ENCOUNTER
Urology staff,  This patient was seen for cystoscopy today.  He has a bladder stone.  He needs to be scheduled for removal of the bladder stone cystoscopically but will need to have preoperative cardiac clearance by Dr. Barr his cardiologist and clearance to be off at least his clopidogrel for 5 days prior to surgery.  Can we reach out to their office and inquire about clearance, appropriate timing of surgery as he just recently had a cardiac stent placed in January of this year.

## 2025-05-19 ENCOUNTER — CARDPULM VISIT (OUTPATIENT)
Dept: CARDIAC REHAB | Facility: HOSPITAL | Age: 85
End: 2025-05-19
Attending: INTERNAL MEDICINE
Payer: MEDICARE

## 2025-05-19 PROCEDURE — 93798 PHYS/QHP OP CAR RHAB W/ECG: CPT

## 2025-05-19 NOTE — TELEPHONE ENCOUNTER
Called wife back she says they already received a call from the on call doctor that night.   Encounter closed.

## 2025-05-20 ENCOUNTER — APPOINTMENT (OUTPATIENT)
Dept: CARDIAC REHAB | Facility: HOSPITAL | Age: 85
End: 2025-05-20
Attending: INTERNAL MEDICINE
Payer: MEDICARE

## 2025-05-22 ENCOUNTER — APPOINTMENT (OUTPATIENT)
Dept: CARDIAC REHAB | Facility: HOSPITAL | Age: 85
End: 2025-05-22
Attending: INTERNAL MEDICINE
Payer: MEDICARE

## 2025-05-27 ENCOUNTER — APPOINTMENT (OUTPATIENT)
Dept: CARDIAC REHAB | Facility: HOSPITAL | Age: 85
End: 2025-05-27
Attending: INTERNAL MEDICINE
Payer: MEDICARE

## 2025-05-29 ENCOUNTER — APPOINTMENT (OUTPATIENT)
Dept: CARDIAC REHAB | Facility: HOSPITAL | Age: 85
End: 2025-05-29
Attending: INTERNAL MEDICINE
Payer: MEDICARE

## 2025-06-02 ENCOUNTER — CARDPULM VISIT (OUTPATIENT)
Dept: CARDIAC REHAB | Facility: HOSPITAL | Age: 85
End: 2025-06-02
Attending: INTERNAL MEDICINE
Payer: MEDICARE

## 2025-06-02 PROCEDURE — 93798 PHYS/QHP OP CAR RHAB W/ECG: CPT

## 2025-06-03 ENCOUNTER — APPOINTMENT (OUTPATIENT)
Dept: CARDIAC REHAB | Facility: HOSPITAL | Age: 85
End: 2025-06-03
Attending: INTERNAL MEDICINE
Payer: MEDICARE

## 2025-06-05 ENCOUNTER — CARDPULM VISIT (OUTPATIENT)
Dept: CARDIAC REHAB | Facility: HOSPITAL | Age: 85
End: 2025-06-05
Attending: INTERNAL MEDICINE
Payer: MEDICARE

## 2025-06-05 PROCEDURE — 93798 PHYS/QHP OP CAR RHAB W/ECG: CPT

## 2025-06-09 ENCOUNTER — CARDPULM VISIT (OUTPATIENT)
Dept: CARDIAC REHAB | Facility: HOSPITAL | Age: 85
End: 2025-06-09
Attending: INTERNAL MEDICINE
Payer: MEDICARE

## 2025-06-09 PROCEDURE — 93798 PHYS/QHP OP CAR RHAB W/ECG: CPT

## 2025-06-10 ENCOUNTER — APPOINTMENT (OUTPATIENT)
Dept: CARDIAC REHAB | Facility: HOSPITAL | Age: 85
End: 2025-06-10
Attending: INTERNAL MEDICINE
Payer: MEDICARE

## 2025-06-12 ENCOUNTER — CARDPULM VISIT (OUTPATIENT)
Dept: CARDIAC REHAB | Facility: HOSPITAL | Age: 85
End: 2025-06-12
Attending: INTERNAL MEDICINE
Payer: MEDICARE

## 2025-06-12 PROCEDURE — 93798 PHYS/QHP OP CAR RHAB W/ECG: CPT

## 2025-06-16 ENCOUNTER — CARDPULM VISIT (OUTPATIENT)
Dept: CARDIAC REHAB | Facility: HOSPITAL | Age: 85
End: 2025-06-16
Attending: INTERNAL MEDICINE
Payer: MEDICARE

## 2025-06-16 PROCEDURE — 93798 PHYS/QHP OP CAR RHAB W/ECG: CPT

## 2025-06-17 ENCOUNTER — APPOINTMENT (OUTPATIENT)
Dept: CARDIAC REHAB | Facility: HOSPITAL | Age: 85
End: 2025-06-17
Attending: INTERNAL MEDICINE
Payer: MEDICARE

## 2025-06-19 ENCOUNTER — APPOINTMENT (OUTPATIENT)
Dept: CARDIAC REHAB | Facility: HOSPITAL | Age: 85
End: 2025-06-19
Attending: INTERNAL MEDICINE
Payer: MEDICARE

## 2025-06-23 ENCOUNTER — CARDPULM VISIT (OUTPATIENT)
Dept: CARDIAC REHAB | Facility: HOSPITAL | Age: 85
End: 2025-06-23
Attending: INTERNAL MEDICINE
Payer: MEDICARE

## 2025-06-23 PROCEDURE — 93798 PHYS/QHP OP CAR RHAB W/ECG: CPT

## 2025-06-24 ENCOUNTER — APPOINTMENT (OUTPATIENT)
Dept: CARDIAC REHAB | Facility: HOSPITAL | Age: 85
End: 2025-06-24
Attending: INTERNAL MEDICINE
Payer: MEDICARE

## 2025-06-26 ENCOUNTER — APPOINTMENT (OUTPATIENT)
Dept: CARDIAC REHAB | Facility: HOSPITAL | Age: 85
End: 2025-06-26
Attending: INTERNAL MEDICINE
Payer: MEDICARE

## 2025-06-26 PROCEDURE — 93798 PHYS/QHP OP CAR RHAB W/ECG: CPT

## 2025-06-30 ENCOUNTER — CARDPULM VISIT (OUTPATIENT)
Dept: CARDIAC REHAB | Facility: HOSPITAL | Age: 85
End: 2025-06-30
Attending: INTERNAL MEDICINE
Payer: MEDICARE

## 2025-06-30 PROCEDURE — 93798 PHYS/QHP OP CAR RHAB W/ECG: CPT

## 2025-07-03 ENCOUNTER — CARDPULM VISIT (OUTPATIENT)
Dept: CARDIAC REHAB | Facility: HOSPITAL | Age: 85
End: 2025-07-03
Attending: INTERNAL MEDICINE
Payer: MEDICARE

## 2025-07-03 PROCEDURE — 93798 PHYS/QHP OP CAR RHAB W/ECG: CPT

## 2025-07-07 ENCOUNTER — APPOINTMENT (OUTPATIENT)
Dept: CARDIAC REHAB | Facility: HOSPITAL | Age: 85
End: 2025-07-07
Attending: INTERNAL MEDICINE
Payer: MEDICARE

## 2025-07-10 ENCOUNTER — APPOINTMENT (OUTPATIENT)
Dept: CARDIAC REHAB | Facility: HOSPITAL | Age: 85
End: 2025-07-10
Attending: INTERNAL MEDICINE
Payer: MEDICARE

## 2025-07-14 ENCOUNTER — APPOINTMENT (OUTPATIENT)
Dept: CARDIAC REHAB | Facility: HOSPITAL | Age: 85
End: 2025-07-14
Attending: INTERNAL MEDICINE
Payer: MEDICARE

## (undated) NOTE — Clinical Note
TCM assessment completed.  Patient declined appointment at this time. A telephone encounter has been sent to clinical staff as an FYI/per TCM protocol.  Thank you.

## (undated) NOTE — LETTER
[place] [address]  Authorization for Invasive Procedures  Date: 01/05/2025           Time: 20:53    I hereby authorize Dr. Barr, my physician and his/her assistants (if applicable), which may include medical students, residents, and/or fellows, to perform the following surgical operation/ procedure and administer such anesthesia as may be determined necessary by my physician:  Operation/Procedure name (s)  Cardiac Catheterization, Left Ventricular Cineangiography, Bilateral Selective Coronary Angiography and/or Right Heart Catheterization; possible Percutaneous Transluminal Coronary Angioplasty, Coronary Atherectomy, Coronary Stent, Intracoronary Thrombolytic therapy, Antiplatelet therapy and/or Intravascular Ultrasound on Robert Dyer   2.   I recognize that during the surgical operation/procedure, unforeseen conditions may necessitate additional or different procedures than those listed above.  I, therefore, further authorize and request that the above-named surgeon, assistants, or designees perform such procedures as are, in their judgment, necessary and desirable.    3.   My surgeon/physician has discussed prior to my surgery the potential benefits, risks and side effects of this procedure; the likelihood of achieving goals; and potential problems that might occur during recuperation.  They also discussed reasonable alternatives to the procedure, including risks, benefits, and side effects related to the alternatives and risks related to not receiving this procedure.  I have had all my questions answered and I acknowledge that no guarantee has been made as to the result that may be obtained.    4.   Should the need arise during my operation/procedure, which includes change of level of care prior to discharge, I also consent to the administration of blood and/or blood products.  Further, I understand that despite careful testing and screening of blood or blood products by  collecting agencies, I may still be subject to ill effects as a result of receiving a blood transfusion and/or blood products.  The following are some, but not all, of the potential risks that can occur: fever and allergic reactions, hemolytic reactions, transmission of diseases such as Hepatitis, AIDS and Cytomegalovirus (CMV) and fluid overload.  In the event that I wish to have an autologous transfusion of my own blood, or a directed donor transfusion, I will discuss this with my physician.  Check only if Refusing Blood or Blood Products  I understand refusal of blood or blood products as deemed necessary by my physician may have serious consequences to my condition to include possible death. I hereby assume responsibility for my refusal and release the hospital, its personnel, and my physicians from any responsibility for the consequences of my refusal.          o  Refuse      5.   I authorize the use of any specimen, organs, tissues, body parts or foreign objects that may be removed from my body during the operation/procedure for diagnosis, research or teaching purposes and their subsequent disposal by hospital authorities.  I also authorize the release of specimen test results and/or written reports to my treating physician on the hospital medical staff or other referring or consulting physicians involved in my care, at the discretion of the Pathologist or my treating physician.    6.   I consent to the photographing or videotaping of the operations or procedures to be performed, including appropriate portions of my body for medical, scientific, or educational purposes, provided my identity is not revealed by the pictures or by descriptive texts accompanying them.  If the procedure has been photographed/videotaped, the surgeon will obtain the original picture, image, videotape or CD.  The hospital will not be responsible for storage, release or maintenance of the picture, image, tape or CD.    7.   I consent  to the presence of a  or observers in the operating room as deemed necessary by my physician or their designees.    8.   I recognize that in the event my procedure results in extended X-Ray/fluoroscopy time, I may develop a skin reaction.    9. If I have a Do Not Attempt Resuscitation (DNAR) order in place, that status will be suspended while in the operating room, procedural suite, and during the recovery period unless otherwise explicitly stated by me (or a person authorized to consent on my behalf). The surgeon or my attending physician will determine when the applicable recovery period ends for purposes of reinstating the DNAR order.  10. Patients having a sterilization procedure: I understand that if the procedure is successful the results will be permanent and it will therefore be impossible for me to inseminate, conceive, or bear children.  I also understand that the procedure is intended to result in sterility, although the result has not been guaranteed.   11. I acknowledge that my physician has explained sedation/analgesia administration to me including the risk and benefits I consent to the administration of sedation/analgesia as may be necessary or desirable in the judgment of my physician.    I CERTIFY THAT I HAVE READ AND FULLY UNDERSTAND THE ABOVE CONSENT TO OPERATION and/or OTHER PROCEDURE.        ____________________________________       _________________________________      ______________________________  Signature of Patient         Signature of Responsible Person        Printed Name of Responsible Person    ____________________________________      _________________________________      ______________________________       Signature of Witness          Relationship to Patient                       Date                                       Time  Patient Name: Robert Dyer  : 1940    Reviewed: 2024   Printed: 2025  Medical Record #: [id number] Page  1 of 2           STATEMENT OF PHYSICIAN My signature below affirms that prior to the time of the procedure; I have explained to the patient and/or his/her legal representative, the risks and benefits involved in the proposed treatment and any reasonable alternative to the proposed treatment. I have also explained the risks and benefits involved in refusal of the proposed treatment and alternatives to the proposed treatment and have answered the patient's questions. If I have a significant financial interest in a co-management agreement or a significant financial interest in any product or implant, or other significant relationship used in this procedure/surgery, I have disclosed this and had a discussion with my patient.     _______________________________________________________________ _____________________________  (Signature of Physician)                                                                                         (Date)                                   (Time)  Patient Name: Robert Dyer  : 1940    Reviewed: 2024   Printed: 2025  Medical Record #: [id number] Page 2 of 2

## (undated) NOTE — Clinical Note
Spoke with pt today--confirms 6/13/2023 TCM/HFU appt, as scheduled. Pt continues to decline COPD clinic appt--thank you.   Future Appointments 6/13/2023  6:30 PM    Liborio Hudson DO        [facility]          [facility]